# Patient Record
Sex: FEMALE | Race: WHITE | NOT HISPANIC OR LATINO | Employment: FULL TIME | URBAN - METROPOLITAN AREA
[De-identification: names, ages, dates, MRNs, and addresses within clinical notes are randomized per-mention and may not be internally consistent; named-entity substitution may affect disease eponyms.]

---
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
## 2022-03-18 ENCOUNTER — OFFICE VISIT (OUTPATIENT)
Dept: INTERNAL MEDICINE | Facility: CLINIC | Age: 22
End: 2022-03-18

## 2022-03-18 VITALS
DIASTOLIC BLOOD PRESSURE: 72 MMHG | SYSTOLIC BLOOD PRESSURE: 118 MMHG | HEART RATE: 66 BPM | WEIGHT: 133 LBS | HEIGHT: 62 IN | TEMPERATURE: 97.5 F | OXYGEN SATURATION: 99 % | BODY MASS INDEX: 24.48 KG/M2

## 2022-03-18 DIAGNOSIS — E55.9 VITAMIN D DEFICIENCY: ICD-10-CM

## 2022-03-18 DIAGNOSIS — F41.8 DEPRESSION WITH ANXIETY: Primary | ICD-10-CM

## 2022-03-18 DIAGNOSIS — R53.83 FATIGUE, UNSPECIFIED TYPE: ICD-10-CM

## 2022-03-18 PROCEDURE — 99214 OFFICE O/P EST MOD 30 MIN: CPT | Performed by: FAMILY MEDICINE

## 2022-03-18 RX ORDER — DESOG-E.ESTRADIOL/E.ESTRADIOL 21-5 (28)
TABLET ORAL
COMMUNITY
Start: 2022-02-22 | End: 2022-04-29 | Stop reason: ALTCHOICE

## 2022-03-18 RX ORDER — CITALOPRAM 20 MG/1
20 TABLET ORAL DAILY
Qty: 90 TABLET | Refills: 1 | Status: SHIPPED | OUTPATIENT
Start: 2022-03-18 | End: 2022-07-28 | Stop reason: SDUPTHER

## 2022-03-19 LAB
25(OH)D3+25(OH)D2 SERPL-MCNC: 21.4 NG/ML (ref 30–100)
ALBUMIN SERPL-MCNC: 4.6 G/DL (ref 3.5–5.2)
ALBUMIN/GLOB SERPL: 2 G/DL
ALP SERPL-CCNC: 77 U/L (ref 39–117)
ALT SERPL-CCNC: 38 U/L (ref 1–33)
AST SERPL-CCNC: 33 U/L (ref 1–32)
BASOPHILS # BLD AUTO: 0.05 10*3/MM3 (ref 0–0.2)
BASOPHILS NFR BLD AUTO: 0.5 % (ref 0–1.5)
BILIRUB SERPL-MCNC: 0.7 MG/DL (ref 0–1.2)
BUN SERPL-MCNC: 10 MG/DL (ref 6–20)
BUN/CREAT SERPL: 13.2 (ref 7–25)
CALCIUM SERPL-MCNC: 9.7 MG/DL (ref 8.6–10.5)
CHLORIDE SERPL-SCNC: 103 MMOL/L (ref 98–107)
CO2 SERPL-SCNC: 22.1 MMOL/L (ref 22–29)
CREAT SERPL-MCNC: 0.76 MG/DL (ref 0.57–1)
EGFRCR SERPLBLD CKD-EPI 2021: 115.2 ML/MIN/1.73
EOSINOPHIL # BLD AUTO: 0.13 10*3/MM3 (ref 0–0.4)
EOSINOPHIL NFR BLD AUTO: 1.4 % (ref 0.3–6.2)
ERYTHROCYTE [DISTWIDTH] IN BLOOD BY AUTOMATED COUNT: 12.5 % (ref 12.3–15.4)
FOLATE SERPL-MCNC: 7.46 NG/ML (ref 4.78–24.2)
GLOBULIN SER CALC-MCNC: 2.3 GM/DL
GLUCOSE SERPL-MCNC: 85 MG/DL (ref 65–99)
HCT VFR BLD AUTO: 40.2 % (ref 34–46.6)
HGB BLD-MCNC: 13.2 G/DL (ref 12–15.9)
IMM GRANULOCYTES # BLD AUTO: 0.03 10*3/MM3 (ref 0–0.05)
IMM GRANULOCYTES NFR BLD AUTO: 0.3 % (ref 0–0.5)
LYMPHOCYTES # BLD AUTO: 2.72 10*3/MM3 (ref 0.7–3.1)
LYMPHOCYTES NFR BLD AUTO: 28.3 % (ref 19.6–45.3)
MCH RBC QN AUTO: 29.5 PG (ref 26.6–33)
MCHC RBC AUTO-ENTMCNC: 32.8 G/DL (ref 31.5–35.7)
MCV RBC AUTO: 89.7 FL (ref 79–97)
MONOCYTES # BLD AUTO: 0.6 10*3/MM3 (ref 0.1–0.9)
MONOCYTES NFR BLD AUTO: 6.2 % (ref 5–12)
NEUTROPHILS # BLD AUTO: 6.08 10*3/MM3 (ref 1.7–7)
NEUTROPHILS NFR BLD AUTO: 63.3 % (ref 42.7–76)
NRBC BLD AUTO-RTO: 0 /100 WBC (ref 0–0.2)
PLATELET # BLD AUTO: 431 10*3/MM3 (ref 140–450)
POTASSIUM SERPL-SCNC: 4.4 MMOL/L (ref 3.5–5.2)
PROT SERPL-MCNC: 6.9 G/DL (ref 6–8.5)
RBC # BLD AUTO: 4.48 10*6/MM3 (ref 3.77–5.28)
SODIUM SERPL-SCNC: 140 MMOL/L (ref 136–145)
T4 FREE SERPL-MCNC: 1.02 NG/DL (ref 0.93–1.7)
TSH SERPL DL<=0.005 MIU/L-ACNC: 1.72 UIU/ML (ref 0.27–4.2)
VIT B12 SERPL-MCNC: 539 PG/ML (ref 211–946)
WBC # BLD AUTO: 9.61 10*3/MM3 (ref 3.4–10.8)

## 2022-04-03 PROBLEM — E55.9 VITAMIN D DEFICIENCY: Status: ACTIVE | Noted: 2022-04-03

## 2022-04-29 ENCOUNTER — OFFICE VISIT (OUTPATIENT)
Dept: INTERNAL MEDICINE | Facility: CLINIC | Age: 22
End: 2022-04-29

## 2022-04-29 VITALS
HEIGHT: 62 IN | DIASTOLIC BLOOD PRESSURE: 64 MMHG | SYSTOLIC BLOOD PRESSURE: 119 MMHG | TEMPERATURE: 96.9 F | OXYGEN SATURATION: 99 % | WEIGHT: 138 LBS | BODY MASS INDEX: 25.4 KG/M2 | RESPIRATION RATE: 15 BRPM | HEART RATE: 88 BPM

## 2022-04-29 DIAGNOSIS — E55.9 VITAMIN D DEFICIENCY: ICD-10-CM

## 2022-04-29 DIAGNOSIS — Z30.41 ORAL CONTRACEPTIVE PILL SURVEILLANCE: ICD-10-CM

## 2022-04-29 DIAGNOSIS — F41.8 DEPRESSION WITH ANXIETY: Primary | ICD-10-CM

## 2022-04-29 PROCEDURE — 99214 OFFICE O/P EST MOD 30 MIN: CPT | Performed by: FAMILY MEDICINE

## 2022-04-29 RX ORDER — LEVONORGESTREL / ETHINYL ESTRADIOL AND ETHINYL ESTRADIOL 150-30(84)
1 KIT ORAL DAILY
Qty: 91 EACH | Refills: 0 | Status: SHIPPED | OUTPATIENT
Start: 2022-04-29 | End: 2022-07-25

## 2022-04-29 NOTE — PROGRESS NOTES
Rachel Steel is a 21 y.o. female.    Chief Complaint   Patient presents with   • Depression     Fup. Doing better  Also talk about birth control        HPI   Patient presents today to follow up on anxiety and depression.  She was started on celexa at her last visit.  She is doing well on the medication.  Patient denies much feeling down, hopelessness, worthlessness.  Decreased nervousness and worry.  Sleeping well.      She does have vitamin D deficiency as seen on recent labs.  She has started vitamin D OTC daily.    Patient also is taking an oral contraceptive.  This was previously prescribed by gynecology.  She has an upcoming appointment with a new gynecologist, but not until June.  She has been receiving a 3 month supply of her contraceptive at time and skips the placebo week for the first 2 months and will take the placebo week on the 3rd month.  As a result, her insurance is not wanting to fill the medication a couple of weeks early.      The following portions of the patient's history were reviewed and updated as appropriate: allergies, current medications, past family history, past medical history, past social history, past surgical history and problem list.     Allergies   Allergen Reactions   • Sulfa Antibiotics Rash         Current Outpatient Medications:   •  citalopram (CeleXA) 20 MG tablet, Take 1 tablet by mouth Daily., Disp: 90 tablet, Rfl: 1  •  Levonorgest-Eth Estrad 91-Day 0.15-0.03 &0.01 MG tablet, Take 1 tablet by mouth Daily., Disp: 91 each, Rfl: 0  •  vitamin D3 125 MCG (5000 UT) capsule capsule, Take 5,000 Units by mouth Daily., Disp: , Rfl:     ROS    Review of Systems   Constitutional: Positive for fatigue. Negative for chills and fever.   Respiratory: Negative for cough and shortness of breath.    Cardiovascular: Negative for chest pain.   Gastrointestinal: Negative for abdominal pain, constipation, diarrhea, nausea and vomiting.   Neurological: Positive for dizziness.  "  Psychiatric/Behavioral: Positive for depressed mood. The patient is nervous/anxious.        Vitals:    04/29/22 1333   BP: 119/64   Pulse: 88   Resp: 15   Temp: 96.9 °F (36.1 °C)   SpO2: 99%   Weight: 62.6 kg (138 lb)   Height: 157.5 cm (62\")   PainSc: 0-No pain     Body mass index is 25.24 kg/m².    Physical Exam     Physical Exam  Constitutional:       General: She is not in acute distress.     Appearance: Normal appearance. She is well-developed.   HENT:      Head: Normocephalic and atraumatic.      Right Ear: External ear normal.      Left Ear: External ear normal.   Eyes:      Extraocular Movements: Extraocular movements intact.      Conjunctiva/sclera: Conjunctivae normal.   Cardiovascular:      Rate and Rhythm: Normal rate and regular rhythm.      Heart sounds: No murmur heard.  Pulmonary:      Effort: Pulmonary effort is normal. No respiratory distress.      Breath sounds: Normal breath sounds. No wheezing.   Abdominal:      General: Bowel sounds are normal. There is no distension.      Palpations: Abdomen is soft.      Tenderness: There is no abdominal tenderness.   Neurological:      Mental Status: She is alert and oriented to person, place, and time.      Cranial Nerves: No cranial nerve deficit.   Psychiatric:         Mood and Affect: Mood normal.         Behavior: Behavior normal.         Assessment/Plan    Problems Addressed this Visit     Depression with anxiety - Primary     Improved with current medication.  Continue celexa at current dosage.            Vitamin D deficiency     Continue oral supplement.  Will monitor vitamin D level every few months.             Oral contraceptive pill surveillance     Will start patient on seasonique 91 day contraceptive since she is taking her current oral contraceptive in a similar way.  Discussed insurance should cover this medication and will be dispensed in 3 month supply with a placebo week at the end of the 3 month package.  Patient to follow up with " gynecology as scheduled.                   New Medications Ordered This Visit   Medications   • Levonorgest-Eth Estrad 91-Day 0.15-0.03 &0.01 MG tablet     Sig: Take 1 tablet by mouth Daily.     Dispense:  91 each     Refill:  0       No orders of the defined types were placed in this encounter.      Return in about 3 months (around 7/29/2022) for anxiety and depression.    Edie Grant, DO

## 2022-04-30 NOTE — ASSESSMENT & PLAN NOTE
Improved with current medication.  Continue celexa at current dosage.    27 M no PMH p/w 4 days of frontal headache and eye pain. worst with laying flat. He has taken flonase and cetrizine w/o much improvement. He states he has hx of allergies and sinus issues for which he was recommended having a procedure few year before. he denies syncope, numbness, weakness, abdominal pain. he has normal neuro exam. He states he has trouble sleeping due to worsened congestion and frontal pain when lying flat. He states his headache is pressure like located over the front of his sinus and right eye. He denies fever, chills, purulent discharge  denies fever, chills, chest pain, SOB, abdominal pain, diarrhea, dysuria, syncope, bleeding, new rash,weakness, numbness, blurred vision    ROS  otherwise negative as per HPI  Gen: Awake, Alert, WD, WN, NAD  Head:  NC/AT  Eyes:  PERRL, EOMI, Conjunctiva pink, lids normal, no scleral icterus  ENT: OP clear, no exudates, no erythema, uvula midline, T moist mucus membranes, septal deviation w/ mild erythema. + maxillar and frontal sinus tendenress . TM both clear   Neck: supple, nontender, no meningismus, no JVD, trachea midline  Cardiac/CV:  S1 S2, RRR, no M/G/R  Respiratory/Pulm:  CTAB, good air movement, normal resp effort, no wheezes/stridor/retractions/rales/rhonchi  Gastrointestinal/Abdomen:  Soft, nontender, nondistended, +BS, no rebound/guarding  Back:  no CVAT, no MLT  Ext:  warm, well perfused, moving all extremities spontaneously, no peripheral edema, distal pulses intact  Skin: intact, no rash  Neuro:  AAOx3, sensation intact, motor 5/5 x 4 extremities, normal gait, speech clear  MDM as above

## 2022-04-30 NOTE — ASSESSMENT & PLAN NOTE
Will start patient on seasonique 91 day contraceptive since she is taking her current oral contraceptive in a similar way.  Discussed insurance should cover this medication and will be dispensed in 3 month supply with a placebo week at the end of the 3 month package.  Patient to follow up with gynecology as scheduled.

## 2022-05-17 ENCOUNTER — OFFICE VISIT (OUTPATIENT)
Dept: INTERNAL MEDICINE | Facility: CLINIC | Age: 22
End: 2022-05-17

## 2022-05-17 VITALS
SYSTOLIC BLOOD PRESSURE: 118 MMHG | TEMPERATURE: 97.6 F | WEIGHT: 138.4 LBS | OXYGEN SATURATION: 99 % | BODY MASS INDEX: 25.47 KG/M2 | HEART RATE: 80 BPM | DIASTOLIC BLOOD PRESSURE: 78 MMHG | HEIGHT: 62 IN

## 2022-05-17 DIAGNOSIS — L08.9 INFECTED CYST OF SKIN: Primary | ICD-10-CM

## 2022-05-17 DIAGNOSIS — L72.9 INFECTED CYST OF SKIN: Primary | ICD-10-CM

## 2022-05-17 PROCEDURE — 99213 OFFICE O/P EST LOW 20 MIN: CPT | Performed by: NURSE PRACTITIONER

## 2022-05-17 RX ORDER — DOXYCYCLINE HYCLATE 100 MG/1
100 CAPSULE ORAL 2 TIMES DAILY
Qty: 14 CAPSULE | Refills: 0 | Status: SHIPPED | OUTPATIENT
Start: 2022-05-17 | End: 2022-05-24

## 2022-05-17 NOTE — PROGRESS NOTES
"  Office Visit      Patient Name: Rachel Steel  : 2000   MRN: [id number]   Care Team: Patient Care Team:  Edie Grant DO as PCP - General (Family Medicine)    Chief Complaint  Swollen lymph node  (In right arm pit, red, this popped up this morning tender to the touch ) and Abdominal Pain (Right upper quadrant, associated with Nausea this started yesterday, loss of appetite, pain worsens with pressure is applied )    Subjective     Subjective      Rachel Steel presents to [facility] for lump in left armpit.   Symptoms started today.   Endorses fatigue, swollen tender lump under the left arm with erythema, left breast pain, nausea, and right upper quadrant pain.  She has not really had an appetite today, is able to hold down liquids and has been drinking water.  She has had trouble with GERD in the past however pain is usually more epigastric.  She is currently on DOLLY and recently changed brands, she reports she takes her medication as prescribed.  She has no prior history of MRSA infection.  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  She has never had this problem in the past.    Review of Systems   Constitutional: Positive for fatigue. Negative for chills and fever.   HENT: Positive for swollen glands. Negative for congestion, postnasal drip, sinus pressure, sneezing, sore throat and trouble swallowing.    Respiratory: Negative for cough, shortness of breath and wheezing.    Cardiovascular: Negative for chest pain.   Gastrointestinal: Positive for abdominal pain and nausea. Negative for diarrhea and vomiting.   Neurological: Negative for headache.   Psychiatric/Behavioral: Negative for sleep disturbance.       Objective     Objective   Vital Signs:   /78   Pulse 80   Temp 97.6 °F (36.4 °C) (Temporal)   Ht 157.5 cm (62\")   Wt 62.8 kg (138 lb 6.4 oz)   SpO2 99%   BMI 25.31 kg/m²     Physical Exam  Vitals and nursing note reviewed. Exam conducted with a " chaperone present (Leslye Womack [facility]).   Constitutional:       General: She is not in acute distress.     Appearance: Normal appearance. She is not toxic-appearing.   Eyes:      Pupils: Pupils are equal, round, and reactive to light.   Cardiovascular:      Rate and Rhythm: Normal rate and regular rhythm.      Heart sounds: Normal heart sounds. No murmur heard.  Pulmonary:      Effort: Pulmonary effort is normal. No respiratory distress.      Breath sounds: Normal breath sounds. No wheezing.   Chest:   Breasts:      Left: Normal. Axillary adenopathy present. No tenderness or supraclavicular adenopathy.         Abdominal:      General: Bowel sounds are normal. There is no distension.      Palpations: Abdomen is soft.      Tenderness: There is no abdominal tenderness. There is no guarding.   Lymphadenopathy:      Upper Body:      Left upper body: Axillary adenopathy present. No supraclavicular adenopathy.   Skin:     General: Skin is warm and dry.      Findings: No rash.   Neurological:      Mental Status: She is alert.   Psychiatric:         Mood and Affect: Mood normal.         Behavior: Behavior normal.          Assessment / Plan      Assessment & Plan   Problem List Items Addressed This Visit    None     Visit Diagnoses     Infected cyst of skin    -  Primary    Relevant Medications    doxycycline (VIBRAMYCIN) 100 MG capsule    Recommend doxycycline twice daily with food, advised on sun sensitivity and worsening nausea.  Recommend changing razor blade frequently and always using shaving cream, bland foods, small frequent meals, push fluids, can use warm compresses as needed, and follow-up in 1 week to ensure improvement.  No reproducible abdominal tenderness on exam today and without red flags.  Discussed worsening symptoms to return sooner with and she verbalized understanding.           Follow Up   Return in about 1 week (around 5/24/2022) for Next scheduled follow up.  Patient was given instructions and  counseling regarding her condition or for health maintenance advice. Please see specific information pulled into the AVS if appropriate.     MAHOGANY Watt  [facility]

## 2022-06-03 ENCOUNTER — OFFICE VISIT (OUTPATIENT)
Dept: INTERNAL MEDICINE | Facility: CLINIC | Age: 22
End: 2022-06-03

## 2022-06-03 ENCOUNTER — TELEPHONE (OUTPATIENT)
Dept: INTERNAL MEDICINE | Facility: CLINIC | Age: 22
End: 2022-06-03

## 2022-06-03 VITALS
DIASTOLIC BLOOD PRESSURE: 80 MMHG | HEART RATE: 100 BPM | WEIGHT: 135 LBS | SYSTOLIC BLOOD PRESSURE: 120 MMHG | BODY MASS INDEX: 24.84 KG/M2 | HEIGHT: 62 IN | OXYGEN SATURATION: 99 % | TEMPERATURE: 97.2 F

## 2022-06-03 DIAGNOSIS — R10.11 RUQ ABDOMINAL PAIN: Primary | ICD-10-CM

## 2022-06-03 DIAGNOSIS — R10.819 SUPRAPUBIC TENDERNESS: ICD-10-CM

## 2022-06-03 LAB
BILIRUB BLD-MCNC: NEGATIVE MG/DL
CLARITY, POC: CLEAR
COLOR UR: ABNORMAL
EXPIRATION DATE: ABNORMAL
GLUCOSE UR STRIP-MCNC: NEGATIVE MG/DL
KETONES UR QL: NEGATIVE
LEUKOCYTE EST, POC: NEGATIVE
Lab: ABNORMAL
NITRITE UR-MCNC: NEGATIVE MG/ML
PH UR: 6 [PH] (ref 5–8)
PROT UR STRIP-MCNC: NEGATIVE MG/DL
RBC # UR STRIP: ABNORMAL /UL
SP GR UR: 1.02 (ref 1–1.03)
UROBILINOGEN UR QL: NORMAL

## 2022-06-03 PROCEDURE — 99213 OFFICE O/P EST LOW 20 MIN: CPT | Performed by: FAMILY MEDICINE

## 2022-06-03 PROCEDURE — 81003 URINALYSIS AUTO W/O SCOPE: CPT | Performed by: FAMILY MEDICINE

## 2022-06-03 RX ORDER — CIPROFLOXACIN 500 MG/1
500 TABLET, FILM COATED ORAL 2 TIMES DAILY
Qty: 14 TABLET | Refills: 0 | Status: SHIPPED | OUTPATIENT
Start: 2022-06-03 | End: 2022-06-03

## 2022-06-03 RX ORDER — DICYCLOMINE HYDROCHLORIDE 10 MG/1
10 CAPSULE ORAL
Qty: 120 CAPSULE | Refills: 1 | Status: SHIPPED | OUTPATIENT
Start: 2022-06-03 | End: 2022-06-27

## 2022-06-03 RX ORDER — CEFDINIR 300 MG/1
300 CAPSULE ORAL 2 TIMES DAILY
Qty: 14 CAPSULE | Refills: 0 | Status: SHIPPED | OUTPATIENT
Start: 2022-06-03 | End: 2022-06-13

## 2022-06-03 NOTE — ASSESSMENT & PLAN NOTE
Concern for gallbladder dysfunction.  Will obtain ultrasound for further evaluation.  May take bentyl prn symptomatic relief.

## 2022-06-03 NOTE — PROGRESS NOTES
"Rachel Steel is a 21 y.o. female.    Chief Complaint   Patient presents with   • Abdominal Pain     Mostly when she eats, upper right quad, sometimes it hurts for hours and sometimes it stops quickly.        HPI   Patient reports RUQ pain for the last 3 weeks.  Pain is intermittent mostly.  Pain is sharp and stabbing.  She reports occasional cramps to RLQ.  She reports pain is worse with any foods.  She denies reflux.  Admits to nausea, diarrhea, and constipation.  Denies any fever.     The following portions of the patient's history were reviewed and updated as appropriate: allergies, current medications, past family history, past medical history, past social history, past surgical history and problem list.     Allergies   Allergen Reactions   • Sulfa Antibiotics Rash         Current Outpatient Medications:   •  citalopram (CeleXA) 20 MG tablet, Take 1 tablet by mouth Daily., Disp: 90 tablet, Rfl: 1  •  Levonorgest-Eth Estrad 91-Day 0.15-0.03 &0.01 MG tablet, Take 1 tablet by mouth Daily., Disp: 91 each, Rfl: 0  •  vitamin D3 125 MCG (5000 UT) capsule capsule, Take 5,000 Units by mouth Daily., Disp: , Rfl:   •  cefdinir (OMNICEF) 300 MG capsule, Take 1 capsule by mouth 2 (Two) Times a Day., Disp: 14 capsule, Rfl: 0  •  dicyclomine (Bentyl) 10 MG capsule, Take 1 capsule by mouth 4 (Four) Times a Day Before Meals & at Bedtime., Disp: 120 capsule, Rfl: 1    ROS    Review of Systems   Constitutional: Negative for chills and fever.   Respiratory: Negative for shortness of breath.    Cardiovascular: Positive for chest pain (pressure).   Gastrointestinal: Positive for abdominal pain, constipation, diarrhea and nausea. Negative for GERD.   Genitourinary: Negative for dysuria.       Vitals:    06/03/22 1505   BP: 120/80   Pulse: 100   Temp: 97.2 °F (36.2 °C)   SpO2: 99%   Weight: 61.2 kg (135 lb)   Height: 157.5 cm (62\")     Body mass index is 24.69 kg/m².    Physical Exam     Physical Exam  Constitutional:       General: " She is not in acute distress.     Appearance: Normal appearance. She is well-developed.   HENT:      Head: Normocephalic and atraumatic.      Right Ear: External ear normal.      Left Ear: External ear normal.   Eyes:      Extraocular Movements: Extraocular movements intact.      Conjunctiva/sclera: Conjunctivae normal.   Cardiovascular:      Rate and Rhythm: Normal rate and regular rhythm.      Heart sounds: No murmur heard.  Pulmonary:      Effort: Pulmonary effort is normal. No respiratory distress.      Breath sounds: Normal breath sounds. No wheezing.   Abdominal:      General: Bowel sounds are normal. There is no distension.      Palpations: Abdomen is soft.      Tenderness: There is abdominal tenderness in the right upper quadrant and suprapubic area.   Skin:     Coloration: Skin is not pale.   Neurological:      Mental Status: She is alert and oriented to person, place, and time.      Cranial Nerves: No cranial nerve deficit.   Psychiatric:         Mood and Affect: Mood normal.         Behavior: Behavior normal.         Assessment/Plan    Problems Addressed this Visit     RUQ abdominal pain - Primary     Concern for gallbladder dysfunction.  Will obtain ultrasound for further evaluation.  May take bentyl prn symptomatic relief.            Relevant Orders    US Gallbladder    Urine Culture - Urine, Urine, Clean Catch    Suprapubic tenderness     Denies any previous pain, but tender on exam.  UA positive for hematuria.  Will treat with omnicef and send urine for culture.            Relevant Orders    POC Urinalysis Dipstick, Automated (Completed)    Urine Culture - Urine, Urine, Clean Catch          New Medications Ordered This Visit   Medications   • dicyclomine (Bentyl) 10 MG capsule     Sig: Take 1 capsule by mouth 4 (Four) Times a Day Before Meals & at Bedtime.     Dispense:  120 capsule     Refill:  1       No orders of the defined types were placed in this encounter.      Return for Next scheduled follow  up.    Edie Grant, DO

## 2022-06-03 NOTE — ASSESSMENT & PLAN NOTE
Denies any previous pain, but tender on exam.  UA positive for hematuria.  Will treat with omnicef and send urine for culture.

## 2022-06-06 ENCOUNTER — TELEPHONE (OUTPATIENT)
Dept: INTERNAL MEDICINE | Facility: CLINIC | Age: 22
End: 2022-06-06

## 2022-06-06 LAB
BACTERIA UR CULT: NORMAL
BACTERIA UR CULT: NORMAL

## 2022-06-06 NOTE — TELEPHONE ENCOUNTER
----- Message from Edie Grant DO sent at 6/6/2022  9:06 AM EDT -----  Results have been reviewed. Please notify patient of normal results.

## 2022-06-13 ENCOUNTER — PATIENT MESSAGE (OUTPATIENT)
Dept: INTERNAL MEDICINE | Facility: CLINIC | Age: 22
End: 2022-06-13

## 2022-06-13 ENCOUNTER — OFFICE VISIT (OUTPATIENT)
Dept: OBSTETRICS AND GYNECOLOGY | Facility: CLINIC | Age: 22
End: 2022-06-13

## 2022-06-13 VITALS
SYSTOLIC BLOOD PRESSURE: 120 MMHG | HEIGHT: 62 IN | WEIGHT: 139 LBS | BODY MASS INDEX: 25.58 KG/M2 | DIASTOLIC BLOOD PRESSURE: 60 MMHG

## 2022-06-13 DIAGNOSIS — N94.10 FEMALE DYSPAREUNIA: ICD-10-CM

## 2022-06-13 DIAGNOSIS — N94.6 DYSMENORRHEA: ICD-10-CM

## 2022-06-13 DIAGNOSIS — K82.8 DYSFUNCTIONAL GALLBLADDER: Primary | ICD-10-CM

## 2022-06-13 DIAGNOSIS — Z11.3 SCREENING EXAMINATION FOR STD (SEXUALLY TRANSMITTED DISEASE): Primary | ICD-10-CM

## 2022-06-13 DIAGNOSIS — K59.00 DYSCHEZIA: ICD-10-CM

## 2022-06-13 DIAGNOSIS — R94.8 ABNORMAL BILIARY HIDA SCAN: ICD-10-CM

## 2022-06-13 PROCEDURE — 99213 OFFICE O/P EST LOW 20 MIN: CPT | Performed by: OBSTETRICS & GYNECOLOGY

## 2022-06-13 PROCEDURE — 99385 PREV VISIT NEW AGE 18-39: CPT | Performed by: OBSTETRICS & GYNECOLOGY

## 2022-06-15 LAB
C TRACH RRNA SPEC QL NAA+PROBE: NEGATIVE
N GONORRHOEA RRNA SPEC QL NAA+PROBE: NEGATIVE
T VAGINALIS RRNA SPEC QL NAA+PROBE: NEGATIVE

## 2022-06-17 ENCOUNTER — TELEPHONE (OUTPATIENT)
Dept: INTERNAL MEDICINE | Facility: CLINIC | Age: 22
End: 2022-06-17

## 2022-06-17 DIAGNOSIS — R10.11 RUQ ABDOMINAL PAIN: Primary | ICD-10-CM

## 2022-06-17 NOTE — TELEPHONE ENCOUNTER
Called and explained that Dr. Vermeesch has ordered a HIDA scan and someone will call to schedule it.

## 2022-06-17 NOTE — TELEPHONE ENCOUNTER
Pharmacy Name: [facility]     Pharmacy representative name: N/A    Pharmacy representative phone number: [phone]    What medication are you calling in regards to: dicyclomine (Bentyl) 10 MG capsule       What question does the pharmacy have: THE MEDICATION HAS INTERACTION WITH CITALOPRAM THAT PATIENT IS TAKING AND WANTED TO MAKE SURE THAT PROVIDER KNOW THIS    Who is the provider that prescribed the medication: PABLO    Additional notes: N/A

## 2022-06-24 ENCOUNTER — APPOINTMENT (OUTPATIENT)
Dept: ULTRASOUND IMAGING | Facility: HOSPITAL | Age: 22
End: 2022-06-24

## 2022-06-24 ENCOUNTER — HOSPITAL ENCOUNTER (OUTPATIENT)
Dept: NUCLEAR MEDICINE | Facility: HOSPITAL | Age: 22
Discharge: HOME | End: 2022-06-24

## 2022-06-24 DIAGNOSIS — R10.11 RUQ ABDOMINAL PAIN: ICD-10-CM

## 2022-06-24 PROCEDURE — 0 TECHNETIUM TC 99M MEBROFENIN KIT: Performed by: INTERNAL MEDICINE

## 2022-06-24 PROCEDURE — 78227 HEPATOBIL SYST IMAGE W/DRUG: CPT

## 2022-06-24 PROCEDURE — A9537 TC99M MEBROFENIN: HCPCS | Performed by: INTERNAL MEDICINE

## 2022-06-24 RX ORDER — KIT FOR THE PREPARATION OF TECHNETIUM TC 99M MEBROFENIN 45 MG/10ML
1 INJECTION, POWDER, LYOPHILIZED, FOR SOLUTION INTRAVENOUS
Status: COMPLETED | OUTPATIENT
Start: 2022-06-24 | End: 2022-06-24

## 2022-06-24 RX ADMIN — MEBROFENIN 1 DOSE: 45 INJECTION, POWDER, LYOPHILIZED, FOR SOLUTION INTRAVENOUS at 10:24

## 2022-06-24 NOTE — PROGRESS NOTES
Please see this abnormal HIDA scan on your patient.  I ordered it while you were gone.  She will need to see surgeon for low ejection fraction of her gallbladder.  Thanks

## 2022-06-27 RX ORDER — DICYCLOMINE HYDROCHLORIDE 10 MG/1
10 CAPSULE ORAL
Qty: 120 CAPSULE | Refills: 1 | Status: SHIPPED | OUTPATIENT
Start: 2022-06-27 | End: 2022-08-22

## 2022-06-27 NOTE — TELEPHONE ENCOUNTER
From: Rachel Steel  To: Edie Grant DO  Sent: 6/13/2022 10:13 AM EDT  Subject: Gallbladder    Hello I didn’t know if my results from my ultrasound have come back yet or not. I have been having some more nausea the past few days and today have not been able to eat anything but have had diarrhea 3 times within the last 2 hours today and didn’t know if that was something I should mention with what’s been going on.

## 2022-07-05 ENCOUNTER — TELEPHONE (OUTPATIENT)
Dept: SURGERY | Facility: CLINIC | Age: 22
End: 2022-07-05

## 2022-07-05 NOTE — TELEPHONE ENCOUNTER
Patient called and states she has been having blood in stool since 7/3/2022 and wants to know if she should be seen sooner than her appointment scheduled on appointment on 8/4/2022.

## 2022-07-05 NOTE — TELEPHONE ENCOUNTER
Patient called requesting a sooner appointment for blood in her stool. She advised me that it was only a small amount now and she will contact her PCP.

## 2022-07-08 ENCOUNTER — OFFICE VISIT (OUTPATIENT)
Dept: INTERNAL MEDICINE | Facility: CLINIC | Age: 22
End: 2022-07-08

## 2022-07-08 VITALS
OXYGEN SATURATION: 99 % | TEMPERATURE: 97 F | SYSTOLIC BLOOD PRESSURE: 110 MMHG | HEIGHT: 62 IN | DIASTOLIC BLOOD PRESSURE: 68 MMHG | BODY MASS INDEX: 23.92 KG/M2 | HEART RATE: 78 BPM | WEIGHT: 130 LBS

## 2022-07-08 DIAGNOSIS — R10.11 RUQ ABDOMINAL PAIN: ICD-10-CM

## 2022-07-08 DIAGNOSIS — K92.1 HEMATOCHEZIA: Primary | ICD-10-CM

## 2022-07-08 DIAGNOSIS — K64.8 INTERNAL HEMORRHOID: ICD-10-CM

## 2022-07-08 PROCEDURE — 99214 OFFICE O/P EST MOD 30 MIN: CPT | Performed by: FAMILY MEDICINE

## 2022-07-08 RX ORDER — HYDROCORTISONE 25 MG/G
CREAM TOPICAL 2 TIMES DAILY
Qty: 28 G | Refills: 2 | OUTPATIENT
Start: 2022-07-08 | End: 2022-08-15

## 2022-07-08 NOTE — PROGRESS NOTES
Rachel Steel is a 21 y.o. female.    Chief Complaint   Patient presents with   • Black or Bloody Stool       HPI   Patient reports bloody stool that began about 5 days ago.  She reports blood can be red or brown in color.  The initial bloody BM was red and did fill the toilet. Denies any rectal pain .  She has noticed a small amount blood in BMs earlier this week.  Today she has had about 6 BMs.  She reports she has had formed stools and loosed stools. Constipation has been worse in the last couple of weeks.  Patient does have gallbladder dysfunction and is scheduled to see general surgery next month to discuss excision.  She has been taking Bentyl as needed for abdominal cramping.  She reports that it helps minimally with the pain.    The following portions of the patient's history were reviewed and updated as appropriate: allergies, current medications, past family history, past medical history, past social history, past surgical history and problem list.     Allergies   Allergen Reactions   • Sulfa Antibiotics Rash         Current Outpatient Medications:   •  citalopram (CeleXA) 20 MG tablet, Take 1 tablet by mouth Daily., Disp: 90 tablet, Rfl: 1  •  dicyclomine (BENTYL) 10 MG capsule, TAKE 1 CAPSULE BY MOUTH 4 (FOUR) TIMES A DAY BEFORE MEALS & AT BEDTIME., Disp: 120 capsule, Rfl: 1  •  Levonorgest-Eth Estrad 91-Day 0.15-0.03 &0.01 MG tablet, Take 1 tablet by mouth Daily., Disp: 91 each, Rfl: 0  •  vitamin D3 125 MCG (5000 UT) capsule capsule, Take 5,000 Units by mouth Daily., Disp: , Rfl:   •  Hydrocortisone, Perianal, (ANUSOL-HC) 2.5 % rectal cream, Insert  into the rectum 2 (Two) Times a Day., Disp: 28 g, Rfl: 2    ROS    Review of Systems   Constitutional: Negative for chills and fever.   Respiratory: Negative for cough and shortness of breath.    Cardiovascular: Negative for chest pain.   Gastrointestinal: Positive for abdominal pain, blood in stool and constipation. Negative for diarrhea and vomiting.  "      Vitals:    07/08/22 1608   BP: 110/68   Pulse: 78   Temp: 97 °F (36.1 °C)   SpO2: 99%   Weight: 59 kg (130 lb)   Height: 157.5 cm (62\")     Body mass index is 23.78 kg/m².    Physical Exam     Physical Exam  Constitutional:       General: She is not in acute distress.     Appearance: Normal appearance. She is well-developed.   HENT:      Head: Normocephalic and atraumatic.      Right Ear: External ear normal.      Left Ear: External ear normal.   Eyes:      Extraocular Movements: Extraocular movements intact.      Conjunctiva/sclera: Conjunctivae normal.   Cardiovascular:      Rate and Rhythm: Normal rate.   Pulmonary:      Effort: Pulmonary effort is normal. No respiratory distress.   Abdominal:      General: Bowel sounds are normal. There is no distension.      Palpations: Abdomen is soft.   Genitourinary:     Rectum: Internal hemorrhoid present.   Skin:     Coloration: Skin is not pale.   Neurological:      Mental Status: She is alert and oriented to person, place, and time.      Cranial Nerves: No cranial nerve deficit.   Psychiatric:         Mood and Affect: Mood normal.         Behavior: Behavior normal.         Assessment/Plan    Problems Addressed this Visit     RUQ abdominal pain     Secondary to gallbladder dysfunction.  Patient may increase Bentyl to 2 capsules up to 4 times daily as needed.  Awaiting appointment with general surgery.           Hematochezia - Primary     Likely secondary to internal hemorrhoid.  Will treat as such.  Patient is to notify the office if bleeding is profuse or does not improve.           Internal hemorrhoid     May take over-the-counter stool softeners or MiraLAX to help with constipation.  We will treat with Anusol rectal cream.                 New Medications Ordered This Visit   Medications   • Hydrocortisone, Perianal, (ANUSOL-HC) 2.5 % rectal cream     Sig: Insert  into the rectum 2 (Two) Times a Day.     Dispense:  28 g     Refill:  2       No orders of the " defined types were placed in this encounter.      Return for Next scheduled follow up.    Edie Grant,

## 2022-07-18 PROBLEM — K92.1 HEMATOCHEZIA: Status: ACTIVE | Noted: 2022-07-18

## 2022-07-18 PROBLEM — K64.8 INTERNAL HEMORRHOID: Status: ACTIVE | Noted: 2022-07-18

## 2022-07-18 NOTE — ASSESSMENT & PLAN NOTE
Likely secondary to internal hemorrhoid.  Will treat as such.  Patient is to notify the office if bleeding is profuse or does not improve.  
May take over-the-counter stool softeners or MiraLAX to help with constipation.  We will treat with Anusol rectal cream.  
Secondary to gallbladder dysfunction.  Patient may increase Bentyl to 2 capsules up to 4 times daily as needed.  Awaiting appointment with general surgery.  
0

## 2022-07-22 ENCOUNTER — OFFICE VISIT (OUTPATIENT)
Dept: INTERNAL MEDICINE | Facility: CLINIC | Age: 22
End: 2022-07-22

## 2022-07-22 VITALS
OXYGEN SATURATION: 99 % | SYSTOLIC BLOOD PRESSURE: 110 MMHG | BODY MASS INDEX: 25.4 KG/M2 | HEIGHT: 62 IN | DIASTOLIC BLOOD PRESSURE: 62 MMHG | HEART RATE: 88 BPM | TEMPERATURE: 97 F | WEIGHT: 138 LBS

## 2022-07-22 DIAGNOSIS — R42 DIZZINESS: ICD-10-CM

## 2022-07-22 DIAGNOSIS — R00.2 PALPITATIONS: ICD-10-CM

## 2022-07-22 DIAGNOSIS — K92.1 HEMATOCHEZIA: Primary | ICD-10-CM

## 2022-07-22 DIAGNOSIS — R10.9 ABDOMINAL PAIN, UNSPECIFIED ABDOMINAL LOCATION: ICD-10-CM

## 2022-07-22 PROCEDURE — 99214 OFFICE O/P EST MOD 30 MIN: CPT | Performed by: FAMILY MEDICINE

## 2022-07-22 NOTE — PROGRESS NOTES
Rachel Steel is a 21 y.o. female.    Chief Complaint   Patient presents with   • Rectal Bleeding     Has gotten worse, not stopped since the first time, when she goes to the bathroom its like a period amount.    • Irregular Heart Beat   • Dizziness       HPI   Patient presents today with persistent rectal bleeding.  It waxes and wanes.  She was recently given anusol for internal hemorrhoids.  Patient denies rectal pain or itching. .She does have stomach pains in random areas.  Admits to nausea, but denies vomiting.  She has a BM twice a day.  She does have formed stools and watery stools. Admits to white, mucus in stool. In addition, she has developed palpitations and dizziness.      The following portions of the patient's history were reviewed and updated as appropriate: allergies, current medications, past family history, past medical history, past social history, past surgical history and problem list.     Allergies   Allergen Reactions   • Sulfa Antibiotics Rash         Current Outpatient Medications:   •  dicyclomine (BENTYL) 10 MG capsule, TAKE 1 CAPSULE BY MOUTH 4 (FOUR) TIMES A DAY BEFORE MEALS & AT BEDTIME., Disp: 120 capsule, Rfl: 1  •  Hydrocortisone, Perianal, (ANUSOL-HC) 2.5 % rectal cream, Insert  into the rectum 2 (Two) Times a Day., Disp: 28 g, Rfl: 2  •  vitamin D3 125 MCG (5000 UT) capsule capsule, Take 5,000 Units by mouth Daily., Disp: , Rfl:   •  citalopram (CeleXA) 40 MG tablet, Take 1 tablet by mouth Daily., Disp: 90 tablet, Rfl: 1  •  Levonorgest-Eth Estrad 91-Day 0.15-0.03 &0.01 MG tablet, TAKE 1 TABLET BY MOUTH EVERY DAY, Disp: 28 each, Rfl: 5    ROS    Review of Systems   Constitutional: Positive for fatigue. Negative for chills and fever.   Respiratory: Negative for cough and shortness of breath.    Cardiovascular: Positive for chest pain (a few times, sharp ) and palpitations.   Gastrointestinal: Positive for blood in stool, constipation, diarrhea and nausea. Negative for abdominal pain  "and vomiting.   Neurological: Positive for dizziness and light-headedness.       Vitals:    07/22/22 0841   BP: 110/62   Pulse: 88   Temp: 97 °F (36.1 °C)   SpO2: 99%   Weight: 62.6 kg (138 lb)   Height: 157.5 cm (62\")     Body mass index is 25.24 kg/m².    Physical Exam     Physical Exam  Constitutional:       General: She is not in acute distress.     Appearance: Normal appearance. She is well-developed.   HENT:      Head: Normocephalic and atraumatic.      Right Ear: External ear normal.      Left Ear: External ear normal.   Eyes:      Extraocular Movements: Extraocular movements intact.      Conjunctiva/sclera: Conjunctivae normal.   Cardiovascular:      Rate and Rhythm: Normal rate and regular rhythm.      Heart sounds: No murmur heard.  Pulmonary:      Effort: Pulmonary effort is normal. No respiratory distress.      Breath sounds: Normal breath sounds. No wheezing.   Abdominal:      General: Bowel sounds are normal. There is no distension.      Palpations: Abdomen is soft.      Tenderness: There is no abdominal tenderness.   Musculoskeletal:      Right lower leg: No edema.      Left lower leg: No edema.   Skin:     General: Skin is warm and dry.   Neurological:      Mental Status: She is alert and oriented to person, place, and time.      Cranial Nerves: No cranial nerve deficit.   Psychiatric:         Mood and Affect: Mood normal.         Behavior: Behavior normal.         Assessment/Plan    Problems Addressed this Visit     Hematochezia - Primary    Relevant Orders    CBC & Differential (Completed)    Celiac Comprehensive Panel (Completed)    Inflammatory Bowel Disease Panel (Completed)    Ova & Parasite Examination - Stool, Per Rectum    Stool Culture (Reference Lab) - Stool, Per Rectum (Completed)    Specimen Status Report (Completed)    Ova & Parasite Examination - Stool, Per Rectum (Completed)      Other Visit Diagnoses     Abdominal pain, unspecified abdominal location        Relevant Orders    Celiac " Comprehensive Panel (Completed)    Inflammatory Bowel Disease Panel (Completed)    Palpitations        Relevant Orders    Comprehensive Metabolic Panel (Completed)    Dizziness         With new onset of palpitations and dizziness, there is concern for developing anemia due to blood loss.  In addition, she is now experiencing mucus in stool.  Will obtain labs to further evaluate changes in BMs and rule out anemia and dehydration.     No orders of the defined types were placed in this encounter.      No orders of the defined types were placed in this encounter.      Return if symptoms worsen or fail to improve, for Next scheduled follow up.    Edie Grant, DO

## 2022-07-23 PROCEDURE — 87209 SMEAR COMPLEX STAIN: CPT | Performed by: FAMILY MEDICINE

## 2022-07-23 PROCEDURE — 87177 OVA AND PARASITES SMEARS: CPT | Performed by: FAMILY MEDICINE

## 2022-07-23 PROCEDURE — 87046 STOOL CULTR AEROBIC BACT EA: CPT | Performed by: FAMILY MEDICINE

## 2022-07-23 PROCEDURE — 87427 SHIGA-LIKE TOXIN AG IA: CPT | Performed by: FAMILY MEDICINE

## 2022-07-23 PROCEDURE — 87045 FECES CULTURE AEROBIC BACT: CPT | Performed by: FAMILY MEDICINE

## 2022-07-25 RX ORDER — LEVONORGESTREL / ETHINYL ESTRADIOL AND ETHINYL ESTRADIOL 150-30(84)
KIT ORAL
Qty: 28 EACH | Refills: 5 | Status: SHIPPED | OUTPATIENT
Start: 2022-07-25 | End: 2023-02-02 | Stop reason: SINTOL

## 2022-07-25 NOTE — TELEPHONE ENCOUNTER
Rx Refill Note  Requested Prescriptions     Pending Prescriptions Disp Refills   • Levonorgest-Eth Estrad 91-Day 0.15-0.03 &0.01 MG tablet [Pharmacy Med Name: LEVONO-E ESTRAD 0.15-0.03-0.01]       Sig: TAKE 1 TABLET BY MOUTH EVERY DAY      Last office visit with prescribing clinician: 7/22/2022      Next office visit with prescribing clinician: 7/28/2022            Danni Hay LPN  07/25/22, 12:56 EDT

## 2022-07-26 LAB
ALBUMIN SERPL-MCNC: 4.4 G/DL (ref 3.5–5.2)
ALBUMIN/GLOB SERPL: 1.8 G/DL
ALP SERPL-CCNC: 77 U/L (ref 39–117)
ALT SERPL-CCNC: 18 U/L (ref 1–33)
AST SERPL-CCNC: 18 U/L (ref 1–32)
BAKER'S YEAST IGA QN: <20 UNITS (ref 0–24.9)
BAKER'S YEAST IGG QN: <20 UNITS (ref 0–24.9)
BASOPHILS # BLD AUTO: 0.05 10*3/MM3 (ref 0–0.2)
BASOPHILS NFR BLD AUTO: 0.6 % (ref 0–1.5)
BILIRUB SERPL-MCNC: 0.9 MG/DL (ref 0–1.2)
BUN SERPL-MCNC: 10 MG/DL (ref 6–20)
BUN/CREAT SERPL: 12.3 (ref 7–25)
CALCIUM SERPL-MCNC: 9.3 MG/DL (ref 8.6–10.5)
CHLORIDE SERPL-SCNC: 101 MMOL/L (ref 98–107)
CO2 SERPL-SCNC: 25.7 MMOL/L (ref 22–29)
CREAT SERPL-MCNC: 0.81 MG/DL (ref 0.57–1)
EGFRCR SERPLBLD CKD-EPI 2021: 106.7 ML/MIN/1.73
ENDOMYSIUM IGA SER QL: NEGATIVE
EOSINOPHIL # BLD AUTO: 0.15 10*3/MM3 (ref 0–0.4)
EOSINOPHIL NFR BLD AUTO: 1.8 % (ref 0.3–6.2)
ERYTHROCYTE [DISTWIDTH] IN BLOOD BY AUTOMATED COUNT: 12.3 % (ref 12.3–15.4)
GLIADIN PEPTIDE IGA SER-ACNC: 3 UNITS (ref 0–19)
GLIADIN PEPTIDE IGG SER-ACNC: 2 UNITS (ref 0–19)
GLOBULIN SER CALC-MCNC: 2.5 GM/DL
GLUCOSE SERPL-MCNC: 88 MG/DL (ref 65–99)
HCT VFR BLD AUTO: 39.7 % (ref 34–46.6)
HGB BLD-MCNC: 13 G/DL (ref 12–15.9)
IGA SERPL-MCNC: 132 MG/DL (ref 87–352)
IMM GRANULOCYTES # BLD AUTO: 0.02 10*3/MM3 (ref 0–0.05)
IMM GRANULOCYTES NFR BLD AUTO: 0.2 % (ref 0–0.5)
LYMPHOCYTES # BLD AUTO: 2.22 10*3/MM3 (ref 0.7–3.1)
LYMPHOCYTES NFR BLD AUTO: 26.3 % (ref 19.6–45.3)
MCH RBC QN AUTO: 29 PG (ref 26.6–33)
MCHC RBC AUTO-ENTMCNC: 32.7 G/DL (ref 31.5–35.7)
MCV RBC AUTO: 88.6 FL (ref 79–97)
MONOCYTES # BLD AUTO: 0.52 10*3/MM3 (ref 0.1–0.9)
MONOCYTES NFR BLD AUTO: 6.2 % (ref 5–12)
NEUTROPHILS # BLD AUTO: 5.47 10*3/MM3 (ref 1.7–7)
NEUTROPHILS NFR BLD AUTO: 64.9 % (ref 42.7–76)
NRBC BLD AUTO-RTO: 0 /100 WBC (ref 0–0.2)
P-ANCA ATYPICAL TITR SER IF: ABNORMAL TITER
PLATELET # BLD AUTO: 398 10*3/MM3 (ref 140–450)
POTASSIUM SERPL-SCNC: 4.5 MMOL/L (ref 3.5–5.2)
PROT SERPL-MCNC: 6.9 G/DL (ref 6–8.5)
RBC # BLD AUTO: 4.48 10*6/MM3 (ref 3.77–5.28)
SODIUM SERPL-SCNC: 136 MMOL/L (ref 136–145)
TTG IGA SER-ACNC: <2 U/ML (ref 0–3)
TTG IGG SER-ACNC: <2 U/ML (ref 0–5)
WBC # BLD AUTO: 8.43 10*3/MM3 (ref 3.4–10.8)

## 2022-07-28 ENCOUNTER — OFFICE VISIT (OUTPATIENT)
Dept: INTERNAL MEDICINE | Facility: CLINIC | Age: 22
End: 2022-07-28

## 2022-07-28 VITALS
WEIGHT: 136 LBS | BODY MASS INDEX: 25.03 KG/M2 | TEMPERATURE: 97 F | DIASTOLIC BLOOD PRESSURE: 80 MMHG | OXYGEN SATURATION: 100 % | SYSTOLIC BLOOD PRESSURE: 120 MMHG | HEART RATE: 88 BPM | HEIGHT: 62 IN

## 2022-07-28 DIAGNOSIS — K92.1 HEMATOCHEZIA: ICD-10-CM

## 2022-07-28 DIAGNOSIS — R10.11 RUQ ABDOMINAL PAIN: ICD-10-CM

## 2022-07-28 DIAGNOSIS — K64.8 INTERNAL HEMORRHOID: ICD-10-CM

## 2022-07-28 DIAGNOSIS — F41.8 DEPRESSION WITH ANXIETY: Primary | ICD-10-CM

## 2022-07-28 PROBLEM — K29.00 ACUTE GASTRITIS: Status: RESOLVED | Noted: 2017-01-19 | Resolved: 2022-07-28

## 2022-07-28 PROCEDURE — 99214 OFFICE O/P EST MOD 30 MIN: CPT | Performed by: FAMILY MEDICINE

## 2022-07-28 RX ORDER — CITALOPRAM 40 MG/1
40 TABLET ORAL DAILY
Qty: 90 TABLET | Refills: 1 | Status: SHIPPED | OUTPATIENT
Start: 2022-07-28

## 2022-07-28 RX ORDER — CITALOPRAM 40 MG/1
40 TABLET ORAL DAILY
Qty: 90 TABLET | Refills: 1 | Status: SHIPPED | OUTPATIENT
Start: 2022-07-28 | End: 2022-07-28 | Stop reason: SDUPTHER

## 2022-07-28 NOTE — PROGRESS NOTES
Rachel Steel is a 21 y.o. female.    Chief Complaint   Patient presents with   • Anxiety   • Depression       HPI   Patient has depression and anxiety.  She has been taking celexa primarily with good response.  She is sleeping okay. She does admit to one anxiety and depression symptoms. She admits to nervousness, worry, hopelessness, worthless.  However, she reports this is improved with medications.    Patient was noted to have gallbladder dysfunction on recent imaging with HIDA scan.  She continues to have RUQ pain, nausea, bloating.  Also has developed rectal bleeding.  She has been treated for internal hemorrhoids.  She has noticed trouble with constipation as well.       The following portions of the patient's history were reviewed and updated as appropriate: allergies, current medications, past family history, past medical history, past social history, past surgical history and problem list.     Allergies   Allergen Reactions   • Sulfa Antibiotics Rash         Current Outpatient Medications:   •  citalopram (CeleXA) 40 MG tablet, Take 1 tablet by mouth Daily., Disp: 90 tablet, Rfl: 1  •  dicyclomine (BENTYL) 10 MG capsule, TAKE 1 CAPSULE BY MOUTH 4 (FOUR) TIMES A DAY BEFORE MEALS & AT BEDTIME., Disp: 120 capsule, Rfl: 1  •  Hydrocortisone, Perianal, (ANUSOL-HC) 2.5 % rectal cream, Insert  into the rectum 2 (Two) Times a Day., Disp: 28 g, Rfl: 2  •  Levonorgest-Eth Estrad 91-Day 0.15-0.03 &0.01 MG tablet, TAKE 1 TABLET BY MOUTH EVERY DAY, Disp: 28 each, Rfl: 5  •  vitamin D3 125 MCG (5000 UT) capsule capsule, Take 5,000 Units by mouth Daily., Disp: , Rfl:     ROS    Review of Systems   Constitutional: Negative for chills and fever.   Respiratory: Positive for shortness of breath. Negative for cough.    Cardiovascular: Positive for chest pain (pressure and stabbing at times).   Gastrointestinal: Positive for abdominal pain, blood in stool, diarrhea and nausea.   Psychiatric/Behavioral: Positive for depressed  "mood. Negative for sleep disturbance. The patient is nervous/anxious.        Vitals:    07/28/22 1134   BP: 120/80   Pulse: 88   Temp: 97 °F (36.1 °C)   SpO2: 100%   Weight: 61.7 kg (136 lb)   Height: 157.5 cm (62\")     Body mass index is 24.87 kg/m².    Physical Exam     Physical Exam  Constitutional:       General: She is not in acute distress.     Appearance: Normal appearance. She is well-developed.   HENT:      Head: Normocephalic and atraumatic.      Right Ear: External ear normal.      Left Ear: External ear normal.   Eyes:      Extraocular Movements: Extraocular movements intact.      Conjunctiva/sclera: Conjunctivae normal.   Cardiovascular:      Rate and Rhythm: Normal rate and regular rhythm.      Heart sounds: No murmur heard.  Pulmonary:      Effort: Pulmonary effort is normal. No respiratory distress.      Breath sounds: Normal breath sounds. No wheezing.   Abdominal:      General: Bowel sounds are normal. There is no distension.      Palpations: Abdomen is soft.      Tenderness: There is abdominal tenderness.   Musculoskeletal:      Right lower leg: No edema.      Left lower leg: No edema.   Skin:     General: Skin is warm and dry.   Neurological:      Mental Status: She is alert and oriented to person, place, and time.      Cranial Nerves: No cranial nerve deficit.   Psychiatric:         Mood and Affect: Mood normal.         Behavior: Behavior normal.         Assessment/Plan    Problems Addressed this Visit     Depression with anxiety - Primary     Improved with current medication.  Will increase celexa for additional benefit.          Relevant Medications    citalopram (CeleXA) 40 MG tablet    RUQ abdominal pain     Secondary to gallbladder dysfunction.  She has a appt with general surgery next week.           Hematochezia     Felt to be secondary to hemorrhoids.  However, due to persistent symptoms, labs were ordered last week.  She did test positive for ANCA, concerning for underlying UC.  She " would benefit from a colonoscopy as well.          Internal hemorrhoid     Likely secondary to Gi irregularities.  May use anusol rectal cream previously prescribed.  May take miralax and/or colace prn constipation.              New Medications Ordered This Visit   Medications   • citalopram (CeleXA) 40 MG tablet     Sig: Take 1 tablet by mouth Daily.     Dispense:  90 tablet     Refill:  1       No orders of the defined types were placed in this encounter.      Return in about 3 months (around 10/28/2022) for anxiety and depression.    Edie Grant, DO

## 2022-07-29 LAB
BACTERIA SPEC CULT: NORMAL
BACTERIA SPEC CULT: NORMAL
CAMPYLOBACTER STL CULT: NORMAL
E COLI SXT STL QL IA: NEGATIVE
O+P SPEC MICRO: NORMAL
O+P STL CONC: NORMAL
SALM + SHIG STL CULT: NORMAL
SPECIMEN STATUS: NORMAL

## 2022-07-31 NOTE — ASSESSMENT & PLAN NOTE
Felt to be secondary to hemorrhoids.  However, due to persistent symptoms, labs were ordered last week.  She did test positive for ANCA, concerning for underlying UC.  She would benefit from a colonoscopy as well.

## 2022-07-31 NOTE — ASSESSMENT & PLAN NOTE
Likely secondary to Gi irregularities.  May use anusol rectal cream previously prescribed.  May take miralax and/or colace prn constipation.

## 2022-08-03 NOTE — PROGRESS NOTES
Subjective   Rachel Steel is a 22 y.o. female.   Chief Complaint   Patient presents with   • Abdominal Pain     Right upper   • Nausea       History of Present Illness   Ms. Steel is a  21 yo female patient who comes to the office today for evaluation of intermittent RUQ abdominal pain and nausea.  She reports that the symptoms have been present for 2-3 months. An ultrasound of the RUQ was completed at the [facility] on 6/10/2022 and was normal.  She then underwent a HIDA scan at [facility] with a diminished EF of 19%. She describes her pain as occurring post-prandially.  She reports exacerbation with greasy and spicy foods.  She denies fevers, chills, acholic stools, dark urine, or jaundice.  She reports that her symptoms have failed to resolve with dietary changes.  She has also had no improvement with bentyl.     The following portions of the patient's history were reviewed and updated as appropriate: allergies, current medications, past family history, past medical history, past social history, past surgical history and problem list.    Patient Active Problem List   Diagnosis   • Asthma   • Depression with anxiety   • Dysmenorrhea   • Vitamin D deficiency   • Oral contraceptive pill surveillance   • RUQ abdominal pain   • Suprapubic tenderness   • Screening examination for STD (sexually transmitted disease)   • Female dyspareunia   • Dyschezia   • Hematochezia   • Internal hemorrhoid   • Biliary dyskinesia       Past Medical History:   Diagnosis Date   • Anemia    • Anxiety    • Depression    • History of stomach ulcers        Past Surgical History:   Procedure Laterality Date   • ENDOSCOPY     • WISDOM TOOTH EXTRACTION         Medications:     Current Outpatient Medications:   •  citalopram (CeleXA) 40 MG tablet, Take 1 tablet by mouth Daily., Disp: 90 tablet, Rfl: 1  •  dicyclomine (BENTYL) 10 MG capsule, TAKE 1 CAPSULE BY MOUTH 4 (FOUR) TIMES A DAY BEFORE MEALS & AT BEDTIME.,  Disp: 120 capsule, Rfl: 1  •  Levonorgest-Eth Estrad 91-Day 0.15-0.03 &0.01 MG tablet, TAKE 1 TABLET BY MOUTH EVERY DAY, Disp: 28 each, Rfl: 5  •  vitamin D3 125 MCG (5000 UT) capsule capsule, Take 5,000 Units by mouth Daily., Disp: , Rfl:   •  ondansetron ODT (ZOFRAN-ODT) 4 MG disintegrating tablet, Place 1 tablet under the tongue Every 8 (Eight) Hours As Needed for Nausea or Vomiting., Disp: 15 tablet, Rfl: 0    Allergies:   Allergies   Allergen Reactions   • Sulfa Antibiotics Rash         Family History   Problem Relation Age of Onset   • Hypertension Father    • Depression Father    • Anxiety disorder Father    • Thyroid disease Mother    • Depression Mother    • Narcolepsy Mother    • Anxiety disorder Mother    • Other Brother         suspected autism   • Heart disease Paternal Grandfather    • Heart disease Maternal Grandmother         probable   • Diabetes Maternal Grandfather    • Colon cancer Maternal Aunt    • Cancer Maternal Aunt         colon   • Arthritis Other    • Thyroid disease Other    • Diabetes Other    • Hypertension Other    • Mental illness Other        Social History     Socioeconomic History   • Marital status:    • Number of children: 0   Tobacco Use   • Smoking status: Never Smoker   • Smokeless tobacco: Never Used   Vaping Use   • Vaping Use: Never used   Substance and Sexual Activity   • Alcohol use: Never   • Drug use: Never   • Sexual activity: Yes     Partners: Male     Birth control/protection: OCP       Review of Systems   Constitutional: Negative for chills, fever and unexpected weight change.   HENT: Negative for hearing loss, trouble swallowing and voice change.    Eyes: Negative for visual disturbance.   Respiratory: Negative for apnea, cough, chest tightness, shortness of breath and wheezing.    Cardiovascular: Negative for chest pain, palpitations and leg swelling.   Gastrointestinal: Positive for abdominal pain, constipation, diarrhea and nausea. Negative for abdominal  "distention, anal bleeding, blood in stool, rectal pain and vomiting.   Endocrine: Negative for cold intolerance and heat intolerance.   Genitourinary: Negative for difficulty urinating, dysuria and flank pain.   Musculoskeletal: Negative for back pain and gait problem.   Skin: Negative for color change, rash and wound.   Neurological: Negative for dizziness, syncope, speech difficulty, weakness, light-headedness, numbness and headaches.   Hematological: Negative for adenopathy. Does not bruise/bleed easily.   Psychiatric/Behavioral: Negative for confusion. The patient is not nervous/anxious.      I have reviewed and confirmed the accuracy of the ROS as documented by the MA/LPN/RN Abbi Reynolds MD      Objective    /68   Pulse 90   Temp 98 °F (36.7 °C)   Ht 157.5 cm (62\")   Wt 64 kg (141 lb)   LMP 07/26/2022 (Approximate)   SpO2 98%   BMI 25.79 kg/m²     Physical Exam  Constitutional:       Appearance: She is well-developed.   HENT:      Head: Normocephalic and atraumatic.   Eyes:      General: No scleral icterus.  Cardiovascular:      Rate and Rhythm: Regular rhythm.   Pulmonary:      Effort: Pulmonary effort is normal.   Abdominal:      General: There is no distension.      Palpations: Abdomen is soft.      Tenderness: There is no abdominal tenderness.   Skin:     General: Skin is warm and dry.   Neurological:      Mental Status: She is alert and oriented to person, place, and time.   Psychiatric:         Behavior: Behavior normal.       Outside Records:  I have taken the opportunity to review the patient's available outside records in paper format, scanned format and those available on Care Everywhere    Results Review:  I have reviewed the entirety of the patient's clinical lab results.  I have also personally reviewed the relevant patient imaging.     NUCLEAR MEDICINE HIDA SCAN WITH PHARMACOLOGICAL INTERVENTION     HEPATOBILIARY SCAN WITH GALLBLADDER EJECTION FRACTION     HISTORY: RUQ abdominal " pain, US nondiagnostic; R10.11-Right upper  quadrant pain.     TECHNIQUE: Following intravenous administration of approximately 5.8 mCi  of TC Choletec, multiple scintigraphic images were obtained.     FINDINGS: The hepatic parenchymal phase shows homogeneous distribution  of tracer throughout the liver. Prompt appearance of tracer is noted in  the intrahepatic and extrahepatic biliary systems. The gallbladder  visualizes normally. Biliary to bowel transit is within normal limits.     Following ingestion of fatty meal, gallbladder ejection is estimated to  be 19%.     IMPRESSION:  1. Normal visualization of activity within biliary tree, gallbladder and  drainage into small bowel.  2. Abnormally low gallbladder ejection fraction of 19%.     This report was signed and finalized on 6/24/2022 5:08 PM by José Manuel Gamble MD.    Assessment & Plan   Diagnoses and all orders for this visit:    1. Biliary dyskinesia (Primary)    2. Pre-op testing  -     COVID PRE-OP / PRE-PROCEDURE SCREENING ORDER (NO ISOLATION) - Swab, Nasopharynx; Future     I recommended to the patient that we proceed with laparoscopic cholecystectomy for definitive management of symptoms related to underlying gallbladder disease, which have failed to respond to medical management.  We discussed the laparoscopic cholecystectomy procedure in detail along with the risks, benefits, and alternatives.  We specifically discussed the risks of bleeding, infection, conversion to an open procedure, postoperative bile leak, common bile duct injury, the need for ERCP (in the setting of bile leak, choledocholithiasis, etc.), and the risks related to anesthesia.  The patient understands these, and is willing to proceed.  My office will facilitate scheduling, and preadmission testing.  The patient understands the need to be n.p.o. after midnight the evening prior to scheduled surgical procedure.

## 2022-08-04 ENCOUNTER — OFFICE VISIT (OUTPATIENT)
Dept: SURGERY | Facility: CLINIC | Age: 22
End: 2022-08-04

## 2022-08-04 VITALS
HEIGHT: 62 IN | SYSTOLIC BLOOD PRESSURE: 112 MMHG | TEMPERATURE: 98 F | WEIGHT: 141 LBS | OXYGEN SATURATION: 98 % | BODY MASS INDEX: 25.95 KG/M2 | HEART RATE: 90 BPM | DIASTOLIC BLOOD PRESSURE: 68 MMHG

## 2022-08-04 DIAGNOSIS — K82.8 BILIARY DYSKINESIA: Primary | ICD-10-CM

## 2022-08-04 DIAGNOSIS — Z01.818 PRE-OP TESTING: ICD-10-CM

## 2022-08-04 PROCEDURE — 99203 OFFICE O/P NEW LOW 30 MIN: CPT | Performed by: SURGERY

## 2022-08-11 ENCOUNTER — PREP FOR SURGERY (OUTPATIENT)
Dept: OTHER | Facility: HOSPITAL | Age: 22
End: 2022-08-11

## 2022-08-11 DIAGNOSIS — K82.8 BILIARY DYSKINESIA: Primary | ICD-10-CM

## 2022-08-11 RX ORDER — HEPARIN SODIUM 5000 [USP'U]/ML
5000 INJECTION, SOLUTION INTRAVENOUS; SUBCUTANEOUS ONCE
Status: CANCELLED | OUTPATIENT
Start: 2022-08-11 | End: 2022-08-11

## 2022-08-11 RX ORDER — SODIUM CHLORIDE 0.9 % (FLUSH) 0.9 %
10 SYRINGE (ML) INJECTION AS NEEDED
Status: CANCELLED | OUTPATIENT
Start: 2022-08-11

## 2022-08-11 RX ORDER — SODIUM CHLORIDE, SODIUM LACTATE, POTASSIUM CHLORIDE, CALCIUM CHLORIDE 600; 310; 30; 20 MG/100ML; MG/100ML; MG/100ML; MG/100ML
50 INJECTION, SOLUTION INTRAVENOUS CONTINUOUS
Status: CANCELLED | OUTPATIENT
Start: 2022-08-11

## 2022-08-11 RX ORDER — SODIUM CHLORIDE 0.9 % (FLUSH) 0.9 %
10 SYRINGE (ML) INJECTION EVERY 12 HOURS SCHEDULED
Status: CANCELLED | OUTPATIENT
Start: 2022-08-11

## 2022-08-16 PROBLEM — K82.8 BILIARY DYSKINESIA: Status: ACTIVE | Noted: 2022-08-16

## 2022-08-19 ENCOUNTER — OFFICE VISIT (OUTPATIENT)
Dept: INTERNAL MEDICINE | Facility: CLINIC | Age: 22
End: 2022-08-19

## 2022-08-19 VITALS
HEART RATE: 108 BPM | DIASTOLIC BLOOD PRESSURE: 80 MMHG | BODY MASS INDEX: 26.5 KG/M2 | OXYGEN SATURATION: 98 % | WEIGHT: 144 LBS | HEIGHT: 62 IN | SYSTOLIC BLOOD PRESSURE: 120 MMHG | TEMPERATURE: 98.1 F

## 2022-08-19 DIAGNOSIS — R30.0 BURNING WITH URINATION: ICD-10-CM

## 2022-08-19 DIAGNOSIS — N30.01 ACUTE CYSTITIS WITH HEMATURIA: Primary | ICD-10-CM

## 2022-08-19 DIAGNOSIS — N92.3 SPOTTING BETWEEN MENSES: ICD-10-CM

## 2022-08-19 LAB
B-HCG UR QL: NEGATIVE
BILIRUB BLD-MCNC: NEGATIVE MG/DL
CLARITY, POC: CLEAR
COLOR UR: ABNORMAL
EXPIRATION DATE: ABNORMAL
EXPIRATION DATE: NORMAL
GLUCOSE UR STRIP-MCNC: NEGATIVE MG/DL
INTERNAL NEGATIVE CONTROL: NORMAL
INTERNAL POSITIVE CONTROL: NORMAL
KETONES UR QL: ABNORMAL
LEUKOCYTE EST, POC: ABNORMAL
Lab: ABNORMAL
Lab: NORMAL
NITRITE UR-MCNC: NEGATIVE MG/ML
PH UR: 6 [PH] (ref 5–8)
PROT UR STRIP-MCNC: ABNORMAL MG/DL
RBC # UR STRIP: ABNORMAL /UL
SP GR UR: 1.02 (ref 1–1.03)
UROBILINOGEN UR QL: NORMAL

## 2022-08-19 PROCEDURE — 81025 URINE PREGNANCY TEST: CPT | Performed by: FAMILY MEDICINE

## 2022-08-19 PROCEDURE — 81003 URINALYSIS AUTO W/O SCOPE: CPT | Performed by: FAMILY MEDICINE

## 2022-08-19 PROCEDURE — 99214 OFFICE O/P EST MOD 30 MIN: CPT | Performed by: FAMILY MEDICINE

## 2022-08-19 RX ORDER — CEFDINIR 300 MG/1
300 CAPSULE ORAL 2 TIMES DAILY
Qty: 20 CAPSULE | Refills: 0 | Status: SHIPPED | OUTPATIENT
Start: 2022-08-19 | End: 2022-09-15

## 2022-08-19 NOTE — PROGRESS NOTES
Rachel Steel is a 22 y.o. female.    Chief Complaint   Patient presents with   • Headache     Have been having headache for like 3 weeks, went and got a Toradol shot and then woke up to the headache the next morning.    • Menstrual Problem   • Urinary Tract Infection       HPI   She reports headache for about 3 weeks.  Admits to nasal congestion in the beginning.  That has resolved.  Frontal headache has remained.  She reports occasional dysuria.  She reports symptoms are very intermittent.  Admits to increased frequency.  She does admit to back pain as well.  She reports she had a period about 3 weeks ago and has them only every 3 months.  However, she has had some intermittent spotting.  Admits to nausea as well and is concerning with possible pregnancy.    The following portions of the patient's history were reviewed and updated as appropriate: allergies, current medications, past family history, past medical history, past social history, past surgical history and problem list.     Allergies   Allergen Reactions   • Sulfa Antibiotics Rash         Current Outpatient Medications:   •  citalopram (CeleXA) 40 MG tablet, Take 1 tablet by mouth Daily., Disp: 90 tablet, Rfl: 1  •  dicyclomine (BENTYL) 10 MG capsule, TAKE 1 CAPSULE BY MOUTH 4 (FOUR) TIMES A DAY BEFORE MEALS & AT BEDTIME., Disp: 120 capsule, Rfl: 1  •  Levonorgest-Eth Estrad 91-Day 0.15-0.03 &0.01 MG tablet, TAKE 1 TABLET BY MOUTH EVERY DAY, Disp: 28 each, Rfl: 5  •  vitamin D3 125 MCG (5000 UT) capsule capsule, Take 5,000 Units by mouth Daily., Disp: , Rfl:   •  cefdinir (OMNICEF) 300 MG capsule, Take 1 capsule by mouth 2 (Two) Times a Day., Disp: 20 capsule, Rfl: 0  •  ondansetron ODT (ZOFRAN-ODT) 4 MG disintegrating tablet, Place 1 tablet under the tongue Every 8 (Eight) Hours As Needed for Nausea or Vomiting., Disp: 15 tablet, Rfl: 0    ROS    Review of Systems   Constitutional: Negative for chills and fever.   HENT: Negative for congestion.   "  Respiratory: Negative for cough and shortness of breath.    Cardiovascular: Positive for chest pain.   Gastrointestinal: Positive for abdominal pain, diarrhea and nausea. Negative for blood in stool and constipation.   Neurological: Positive for headache.       Vitals:    08/19/22 1401   BP: 120/80   Pulse: 108   Temp: 98.1 °F (36.7 °C)   SpO2: 98%   Weight: 65.3 kg (144 lb)   Height: 157.5 cm (62\")     Body mass index is 26.34 kg/m².    Physical Exam     Physical Exam  Constitutional:       General: She is not in acute distress.     Appearance: Normal appearance. She is well-developed.   HENT:      Head: Normocephalic and atraumatic.      Right Ear: External ear normal.      Left Ear: External ear normal.   Eyes:      Extraocular Movements: Extraocular movements intact.      Conjunctiva/sclera: Conjunctivae normal.   Cardiovascular:      Rate and Rhythm: Normal rate and regular rhythm.      Heart sounds: No murmur heard.  Pulmonary:      Effort: Pulmonary effort is normal. No respiratory distress.      Breath sounds: Normal breath sounds. No wheezing.   Abdominal:      General: Bowel sounds are normal. There is no distension.      Palpations: Abdomen is soft.      Tenderness: There is abdominal tenderness. There is no right CVA tenderness or left CVA tenderness.   Musculoskeletal:         General: Tenderness (left lower back) present.   Skin:     General: Skin is warm and dry.   Neurological:      Mental Status: She is alert and oriented to person, place, and time.      Cranial Nerves: No cranial nerve deficit.   Psychiatric:         Mood and Affect: Mood normal.         Behavior: Behavior normal.         Assessment/Plan    Problems Addressed this Visit    None     Visit Diagnoses     Acute cystitis with hematuria    -  Primary    Relevant Orders    Urine Culture - Urine, Urine, Clean Catch    Burning with urination        Relevant Orders    POCT urinalysis dipstick, automated (Completed)    Spotting between " menses        Relevant Orders    POCT pregnancy, urine (Completed)    HCG, B-subunit, Quantitative (Completed)        Urine pregnancy test negative.  Will obtain serum pregnancy test for reassurance.  Advised all symptoms may be related to UTI.  Will treat with omnicef and send urine for culture.      New Medications Ordered This Visit   Medications   • cefdinir (OMNICEF) 300 MG capsule     Sig: Take 1 capsule by mouth 2 (Two) Times a Day.     Dispense:  20 capsule     Refill:  0       No orders of the defined types were placed in this encounter.      Return if symptoms worsen or fail to improve.    Edie Grant, DO

## 2022-08-20 LAB — HCG INTACT+B SERPL-ACNC: <1 MIU/ML

## 2022-08-21 LAB
BACTERIA UR CULT: NORMAL
BACTERIA UR CULT: NORMAL

## 2022-08-22 RX ORDER — DICYCLOMINE HYDROCHLORIDE 10 MG/1
10 CAPSULE ORAL
Qty: 120 CAPSULE | Refills: 1 | Status: SHIPPED | OUTPATIENT
Start: 2022-08-22 | End: 2023-02-02

## 2022-08-23 ENCOUNTER — TELEPHONE (OUTPATIENT)
Dept: PREADMISSION TESTING | Facility: HOSPITAL | Age: 22
End: 2022-08-23

## 2022-08-23 ENCOUNTER — TELEPHONE (OUTPATIENT)
Dept: SURGERY | Facility: CLINIC | Age: 22
End: 2022-08-23

## 2022-08-23 NOTE — TELEPHONE ENCOUNTER
LVM for patient to call and confirm Lap Ada scheduled 8/26/2022.  Post op scheduled 9/1/2022 at 9:00.

## 2022-08-24 ENCOUNTER — PRE-ADMISSION TESTING (OUTPATIENT)
Dept: PREADMISSION TESTING | Facility: HOSPITAL | Age: 22
End: 2022-08-24

## 2022-08-24 VITALS — BODY MASS INDEX: 26.94 KG/M2 | WEIGHT: 146.4 LBS | HEIGHT: 62 IN

## 2022-08-24 DIAGNOSIS — Z01.818 PRE-OP TESTING: ICD-10-CM

## 2022-08-24 LAB
B-HCG UR QL: NEGATIVE
DEPRECATED RDW RBC AUTO: 43.2 FL (ref 37–54)
ERYTHROCYTE [DISTWIDTH] IN BLOOD BY AUTOMATED COUNT: 13.3 % (ref 12.3–15.4)
HCT VFR BLD AUTO: 38.1 % (ref 34–46.6)
HGB BLD-MCNC: 12.6 G/DL (ref 12–15.9)
MCH RBC QN AUTO: 29 PG (ref 26.6–33)
MCHC RBC AUTO-ENTMCNC: 33.1 G/DL (ref 31.5–35.7)
MCV RBC AUTO: 87.8 FL (ref 79–97)
PLATELET # BLD AUTO: 435 10*3/MM3 (ref 140–450)
PMV BLD AUTO: 9.4 FL (ref 6–12)
RBC # BLD AUTO: 4.34 10*6/MM3 (ref 3.77–5.28)
WBC NRBC COR # BLD: 10.74 10*3/MM3 (ref 3.4–10.8)

## 2022-08-24 PROCEDURE — C9803 HOPD COVID-19 SPEC COLLECT: HCPCS

## 2022-08-24 PROCEDURE — 36415 COLL VENOUS BLD VENIPUNCTURE: CPT

## 2022-08-24 PROCEDURE — 85027 COMPLETE CBC AUTOMATED: CPT

## 2022-08-24 PROCEDURE — U0004 COV-19 TEST NON-CDC HGH THRU: HCPCS

## 2022-08-24 PROCEDURE — 81025 URINE PREGNANCY TEST: CPT

## 2022-08-25 LAB — SARS-COV-2 RNA NOSE QL NAA+PROBE: NOT DETECTED

## 2022-08-26 ENCOUNTER — HOSPITAL ENCOUNTER (OUTPATIENT)
Facility: HOSPITAL | Age: 22
Setting detail: HOSPITAL OUTPATIENT SURGERY
Discharge: HOME | End: 2022-08-26
Attending: SURGERY | Admitting: SURGERY

## 2022-08-26 ENCOUNTER — ANESTHESIA (OUTPATIENT)
Dept: PERIOP | Facility: HOSPITAL | Age: 22
End: 2022-08-26

## 2022-08-26 ENCOUNTER — ANESTHESIA EVENT (OUTPATIENT)
Dept: PERIOP | Facility: HOSPITAL | Age: 22
End: 2022-08-26

## 2022-08-26 VITALS
DIASTOLIC BLOOD PRESSURE: 79 MMHG | SYSTOLIC BLOOD PRESSURE: 119 MMHG | HEART RATE: 83 BPM | OXYGEN SATURATION: 96 % | TEMPERATURE: 98.1 F | RESPIRATION RATE: 18 BRPM

## 2022-08-26 DIAGNOSIS — K82.8 BILIARY DYSKINESIA: ICD-10-CM

## 2022-08-26 PROCEDURE — 25010000002 DEXAMETHASONE PER 1 MG: Performed by: NURSE ANESTHETIST, CERTIFIED REGISTERED

## 2022-08-26 PROCEDURE — 25010000002 MIDAZOLAM PER 1MG: Performed by: NURSE ANESTHETIST, CERTIFIED REGISTERED

## 2022-08-26 PROCEDURE — 0 AMPICILLIN-SULBACTAM PER 1.5 G

## 2022-08-26 PROCEDURE — 25010000002 HYDROMORPHONE 1 MG/ML SOLUTION: Performed by: NURSE ANESTHETIST, CERTIFIED REGISTERED

## 2022-08-26 PROCEDURE — 25010000002 FENTANYL CITRATE (PF) 100 MCG/2ML SOLUTION: Performed by: NURSE ANESTHETIST, CERTIFIED REGISTERED

## 2022-08-26 PROCEDURE — 47562 LAPAROSCOPIC CHOLECYSTECTOMY: CPT | Performed by: SURGERY

## 2022-08-26 PROCEDURE — 25010000002 ONDANSETRON PER 1 MG: Performed by: NURSE ANESTHETIST, CERTIFIED REGISTERED

## 2022-08-26 PROCEDURE — 0 LIDOCAINE 1 % SOLUTION: Performed by: SURGERY

## 2022-08-26 PROCEDURE — 25010000002 KETOROLAC TROMETHAMINE PER 15 MG: Performed by: NURSE ANESTHETIST, CERTIFIED REGISTERED

## 2022-08-26 PROCEDURE — 25010000002 METOCLOPRAMIDE PER 10 MG: Performed by: NURSE ANESTHETIST, CERTIFIED REGISTERED

## 2022-08-26 PROCEDURE — 25010000002 HEPARIN (PORCINE) PER 1000 UNITS: Performed by: SURGERY

## 2022-08-26 PROCEDURE — 25010000002 PROPOFOL 200 MG/20ML EMULSION: Performed by: NURSE ANESTHETIST, CERTIFIED REGISTERED

## 2022-08-26 DEVICE — LIGAMAX 5 MM ENDOSCOPIC MULTIPLE CLIP APPLIER
Type: IMPLANTABLE DEVICE | Site: ABDOMEN | Status: FUNCTIONAL
Brand: LIGAMAX

## 2022-08-26 RX ORDER — FENTANYL CITRATE 50 UG/ML
INJECTION, SOLUTION INTRAMUSCULAR; INTRAVENOUS AS NEEDED
Status: DISCONTINUED | OUTPATIENT
Start: 2022-08-26 | End: 2022-08-26 | Stop reason: SURG

## 2022-08-26 RX ORDER — METOCLOPRAMIDE HYDROCHLORIDE 5 MG/ML
INJECTION INTRAMUSCULAR; INTRAVENOUS AS NEEDED
Status: DISCONTINUED | OUTPATIENT
Start: 2022-08-26 | End: 2022-08-26 | Stop reason: SURG

## 2022-08-26 RX ORDER — ONDANSETRON 2 MG/ML
4 INJECTION INTRAMUSCULAR; INTRAVENOUS ONCE AS NEEDED
Status: DISCONTINUED | OUTPATIENT
Start: 2022-08-26 | End: 2022-08-26 | Stop reason: HOSPADM

## 2022-08-26 RX ORDER — MIDAZOLAM HYDROCHLORIDE 2 MG/2ML
2 INJECTION, SOLUTION INTRAMUSCULAR; INTRAVENOUS ONCE
Status: DISCONTINUED | OUTPATIENT
Start: 2022-08-26 | End: 2022-08-26 | Stop reason: HOSPADM

## 2022-08-26 RX ORDER — LORAZEPAM 2 MG/ML
1 INJECTION INTRAMUSCULAR EVERY 4 HOURS PRN
Status: DISCONTINUED | OUTPATIENT
Start: 2022-08-26 | End: 2022-08-26 | Stop reason: HOSPADM

## 2022-08-26 RX ORDER — SODIUM CHLORIDE 0.9 % (FLUSH) 0.9 %
10 SYRINGE (ML) INJECTION AS NEEDED
Status: DISCONTINUED | OUTPATIENT
Start: 2022-08-26 | End: 2022-08-26 | Stop reason: HOSPADM

## 2022-08-26 RX ORDER — BUPIVACAINE HYDROCHLORIDE 2.5 MG/ML
INJECTION, SOLUTION EPIDURAL; INFILTRATION; INTRACAUDAL AS NEEDED
Status: DISCONTINUED | OUTPATIENT
Start: 2022-08-26 | End: 2022-08-26 | Stop reason: HOSPADM

## 2022-08-26 RX ORDER — LIDOCAINE HYDROCHLORIDE 20 MG/ML
INJECTION, SOLUTION INTRAVENOUS AS NEEDED
Status: DISCONTINUED | OUTPATIENT
Start: 2022-08-26 | End: 2022-08-26 | Stop reason: SURG

## 2022-08-26 RX ORDER — MEPERIDINE HYDROCHLORIDE 25 MG/ML
25 INJECTION INTRAMUSCULAR; INTRAVENOUS; SUBCUTANEOUS
Status: DISCONTINUED | OUTPATIENT
Start: 2022-08-26 | End: 2022-08-26 | Stop reason: HOSPADM

## 2022-08-26 RX ORDER — MIDAZOLAM HYDROCHLORIDE 2 MG/2ML
INJECTION, SOLUTION INTRAMUSCULAR; INTRAVENOUS AS NEEDED
Status: DISCONTINUED | OUTPATIENT
Start: 2022-08-26 | End: 2022-08-26 | Stop reason: SURG

## 2022-08-26 RX ORDER — HEPARIN SODIUM 5000 [USP'U]/ML
5000 INJECTION, SOLUTION INTRAVENOUS; SUBCUTANEOUS ONCE
Status: COMPLETED | OUTPATIENT
Start: 2022-08-26 | End: 2022-08-26

## 2022-08-26 RX ORDER — ONDANSETRON 2 MG/ML
INJECTION INTRAMUSCULAR; INTRAVENOUS AS NEEDED
Status: DISCONTINUED | OUTPATIENT
Start: 2022-08-26 | End: 2022-08-26 | Stop reason: SURG

## 2022-08-26 RX ORDER — HYDROCODONE BITARTRATE AND ACETAMINOPHEN 7.5; 325 MG/1; MG/1
1 TABLET ORAL ONCE AS NEEDED
Status: DISCONTINUED | OUTPATIENT
Start: 2022-08-26 | End: 2022-08-26 | Stop reason: HOSPADM

## 2022-08-26 RX ORDER — HYDROCODONE BITARTRATE AND ACETAMINOPHEN 7.5; 325 MG/1; MG/1
1 TABLET ORAL EVERY 4 HOURS PRN
Qty: 15 TABLET | Refills: 0 | Status: SHIPPED | OUTPATIENT
Start: 2022-08-26 | End: 2022-11-01 | Stop reason: HOSPADM

## 2022-08-26 RX ORDER — SODIUM CHLORIDE, SODIUM LACTATE, POTASSIUM CHLORIDE, CALCIUM CHLORIDE 600; 310; 30; 20 MG/100ML; MG/100ML; MG/100ML; MG/100ML
50 INJECTION, SOLUTION INTRAVENOUS CONTINUOUS
Status: DISCONTINUED | OUTPATIENT
Start: 2022-08-26 | End: 2022-08-26 | Stop reason: HOSPADM

## 2022-08-26 RX ORDER — PROPOFOL 10 MG/ML
INJECTION, EMULSION INTRAVENOUS AS NEEDED
Status: DISCONTINUED | OUTPATIENT
Start: 2022-08-26 | End: 2022-08-26 | Stop reason: SURG

## 2022-08-26 RX ORDER — KETOROLAC TROMETHAMINE 30 MG/ML
INJECTION, SOLUTION INTRAMUSCULAR; INTRAVENOUS AS NEEDED
Status: DISCONTINUED | OUTPATIENT
Start: 2022-08-26 | End: 2022-08-26 | Stop reason: SURG

## 2022-08-26 RX ORDER — DEXAMETHASONE SODIUM PHOSPHATE 4 MG/ML
INJECTION, SOLUTION INTRA-ARTICULAR; INTRALESIONAL; INTRAMUSCULAR; INTRAVENOUS; SOFT TISSUE AS NEEDED
Status: DISCONTINUED | OUTPATIENT
Start: 2022-08-26 | End: 2022-08-26 | Stop reason: SURG

## 2022-08-26 RX ORDER — ROCURONIUM BROMIDE 10 MG/ML
INJECTION, SOLUTION INTRAVENOUS AS NEEDED
Status: DISCONTINUED | OUTPATIENT
Start: 2022-08-26 | End: 2022-08-26 | Stop reason: SURG

## 2022-08-26 RX ORDER — LIDOCAINE HYDROCHLORIDE 10 MG/ML
INJECTION, SOLUTION INFILTRATION; PERINEURAL AS NEEDED
Status: DISCONTINUED | OUTPATIENT
Start: 2022-08-26 | End: 2022-08-26 | Stop reason: HOSPADM

## 2022-08-26 RX ORDER — SODIUM CHLORIDE 0.9 % (FLUSH) 0.9 %
10 SYRINGE (ML) INJECTION EVERY 12 HOURS SCHEDULED
Status: DISCONTINUED | OUTPATIENT
Start: 2022-08-26 | End: 2022-08-26 | Stop reason: HOSPADM

## 2022-08-26 RX ORDER — IPRATROPIUM BROMIDE AND ALBUTEROL SULFATE 2.5; .5 MG/3ML; MG/3ML
3 SOLUTION RESPIRATORY (INHALATION) ONCE
Status: DISCONTINUED | OUTPATIENT
Start: 2022-08-26 | End: 2022-08-26 | Stop reason: HOSPADM

## 2022-08-26 RX ORDER — NEOSTIGMINE METHYLSULFATE 5 MG/5 ML
SYRINGE (ML) INTRAVENOUS AS NEEDED
Status: DISCONTINUED | OUTPATIENT
Start: 2022-08-26 | End: 2022-08-26 | Stop reason: SURG

## 2022-08-26 RX ADMIN — HEPARIN SODIUM 5000 UNITS: 5000 INJECTION INTRAVENOUS; SUBCUTANEOUS at 12:03

## 2022-08-26 RX ADMIN — SODIUM CHLORIDE, POTASSIUM CHLORIDE, SODIUM LACTATE AND CALCIUM CHLORIDE: 600; 310; 30; 20 INJECTION, SOLUTION INTRAVENOUS at 12:50

## 2022-08-26 RX ADMIN — LIDOCAINE HYDROCHLORIDE 100 MG: 20 INJECTION, SOLUTION INTRAVENOUS at 12:11

## 2022-08-26 RX ADMIN — GLYCOPYRROLATE 0.2 MG: 0.2 INJECTION, SOLUTION INTRAMUSCULAR; INTRAVENOUS at 12:11

## 2022-08-26 RX ADMIN — METOCLOPRAMIDE 10 MG: 5 INJECTION, SOLUTION INTRAMUSCULAR; INTRAVENOUS at 12:11

## 2022-08-26 RX ADMIN — ONDANSETRON 4 MG: 2 INJECTION INTRAMUSCULAR; INTRAVENOUS at 12:11

## 2022-08-26 RX ADMIN — DEXAMETHASONE SODIUM PHOSPHATE 8 MG: 4 INJECTION, SOLUTION INTRAMUSCULAR; INTRAVENOUS at 12:11

## 2022-08-26 RX ADMIN — HYDROMORPHONE HYDROCHLORIDE 0.5 MG: 1 INJECTION, SOLUTION INTRAMUSCULAR; INTRAVENOUS; SUBCUTANEOUS at 13:55

## 2022-08-26 RX ADMIN — FENTANYL CITRATE 100 MCG: 50 INJECTION INTRAMUSCULAR; INTRAVENOUS at 12:11

## 2022-08-26 RX ADMIN — ROCURONIUM BROMIDE 50 MG: 10 INJECTION INTRAVENOUS at 12:11

## 2022-08-26 RX ADMIN — SUGAMMADEX 200 MG: 100 INJECTION, SOLUTION INTRAVENOUS at 13:11

## 2022-08-26 RX ADMIN — GLYCOPYRROLATE 0.6 MG: 0.2 INJECTION, SOLUTION INTRAMUSCULAR; INTRAVENOUS at 13:02

## 2022-08-26 RX ADMIN — KETOROLAC TROMETHAMINE 30 MG: 30 INJECTION, SOLUTION INTRAMUSCULAR at 12:11

## 2022-08-26 RX ADMIN — Medication 3 G: at 12:16

## 2022-08-26 RX ADMIN — MIDAZOLAM HYDROCHLORIDE 2 MG: 1 INJECTION, SOLUTION INTRAMUSCULAR; INTRAVENOUS at 12:05

## 2022-08-26 RX ADMIN — SODIUM CHLORIDE, POTASSIUM CHLORIDE, SODIUM LACTATE AND CALCIUM CHLORIDE 50 ML/HR: 600; 310; 30; 20 INJECTION, SOLUTION INTRAVENOUS at 10:06

## 2022-08-26 RX ADMIN — PROPOFOL 200 MG: 10 INJECTION, EMULSION INTRAVENOUS at 12:11

## 2022-08-26 RX ADMIN — Medication 5 MG: at 13:02

## 2022-08-26 NOTE — ANESTHESIA PREPROCEDURE EVALUATION
Anesthesia Evaluation     Patient summary reviewed and Nursing notes reviewed   no history of anesthetic complications:  NPO Solid Status: > 8 hours  NPO Liquid Status: > 8 hours           Airway   Mallampati: II  TM distance: >3 FB  Neck ROM: full  no difficulty expected  Dental - normal exam     Pulmonary - normal exam   (+) asthma,  Cardiovascular - negative cardio ROS and normal exam    Rhythm: regular  Rate: normal        Neuro/Psych  (+) psychiatric history Anxiety and Depression,    GI/Hepatic/Renal/Endo - negative ROS     Musculoskeletal (-) negative ROS    Abdominal    Substance History - negative use     OB/GYN negative ob/gyn ROS         Other - negative ROS                       Anesthesia Plan    ASA 2     general     (Risks and benefits discussed including risk of aspiration, recall and dental damage. All patient questions answered.    Patient told that a breathing tube will be used to manage the airway.    Will continue with plan of care.)  intravenous induction     Anesthetic plan, risks, benefits, and alternatives have been provided, discussed and informed consent has been obtained with: patient.        CODE STATUS:

## 2022-08-26 NOTE — ADDENDUM NOTE
Addendum  created 08/26/22 1458 by Jones Moncada, CRNA    Flowsheet accepted, Intraprocedure Flowsheets edited

## 2022-08-26 NOTE — ANESTHESIA PROCEDURE NOTES
Airway  Urgency: elective    Date/Time: 8/26/2022 12:12 PM  Airway not difficult    General Information and Staff    Patient location during procedure: OR  CRNA/CAA: Jones Moncada CRNA    Indications and Patient Condition  Indications for airway management: airway protection    Preoxygenated: yes  Mask difficulty assessment: 1 - vent by mask    Final Airway Details  Final airway type: endotracheal airway      Successful airway: ETT  Cuffed: yes   Successful intubation technique: direct laryngoscopy  Facilitating devices/methods: intubating stylet  Endotracheal tube insertion site: oral  Blade: Columba  Blade size: 4  ETT size (mm): 7.5  Cormack-Lehane Classification: grade I - full view of glottis  Placement verified by: chest auscultation and capnometry   Measured from: lips  ETT/EBT  to lips (cm): 21  Number of attempts at approach: 1  Assessment: lips, teeth, and gum same as pre-op and atraumatic intubation    Additional Comments  Dentition and Lips as preoperative assessment. Airway placed without complication. ETT cuff inflated to minimal occlusive pressure.

## 2022-08-26 NOTE — ANESTHESIA POSTPROCEDURE EVALUATION
Patient: Rachel Steel    Procedure Summary     Date: 08/26/22 Room / Location: [facility] OR 5 /  [facility]    Anesthesia Start: 1207 Anesthesia Stop: 1314    Procedure: CHOLECYSTECTOMY LAPAROSCOPIC (N/A Abdomen) Diagnosis:       Biliary dyskinesia      (Biliary dyskinesia [K82.8])    Surgeons: Abbi Reynolds MD Provider: Jones Moncada CRNA    Anesthesia Type: general ASA Status: 2          Anesthesia Type: general    Vitals  Vitals Value Taken Time   /73 08/26/22 1335   Temp 97.3 °F (36.3 °C) 08/26/22 1324   Pulse 76 08/26/22 1340   Resp 12 08/26/22 1335   SpO2 100 % 08/26/22 1340   Vitals shown include unvalidated device data.          Post Anesthesia Care and Evaluation    Patient location during evaluation: PACU  Patient participation: complete - patient participated  Level of consciousness: awake  Pain score: 3  Pain management: adequate    Airway patency: patent  Anesthetic complications: No anesthetic complications  PONV Status: controlled  Cardiovascular status: acceptable and stable  Respiratory status: acceptable and face mask  Hydration status: acceptable

## 2022-08-30 LAB — REF LAB TEST METHOD: NORMAL

## 2022-08-31 ENCOUNTER — TELEPHONE (OUTPATIENT)
Dept: SURGERY | Facility: CLINIC | Age: 22
End: 2022-08-31

## 2022-09-09 NOTE — OP NOTE
PROCEDURE DATE: 8/26/2022    SURGEON: Abbi Reynolds MD, FACS    PREOPERATIVE DIAGNOSIS: Biliary dyskinesia    POSTOPERATIVE DIAGNOSIS: Same and chronic cholecystitis    PROCEDURE: Laparoscopic cholecystectomy    ANESTHESIA: general    EBL: 15mL    SPECIMENS: Gallbladder    INDICATIONS FOR THE PROCEDURE: Ms. Steel is a 22-year-old female patient evaluated my office for intermittent right upper quadrant abdominal pain with nausea.  Ultrasound right upper quadrant was unremarkable.  A HIDA scan performed at [facility] later demonstrated a diminished ejection fraction at 19% with reproduction of her abdominal pain.  Her pain was described as being postprandial in nature, and exacerbated by eating.  Despite dietary changes, and avoidance of exacerbating foods, she continued to have ongoing symptoms.  For this reason, a laparoscopic cholecystectomy was recommended for definitive management.  She was counseled accordingly regarding the risks, benefits, and alternatives.  She provided informed consent to proceed and is now being brought to the operating room for the same.    DESCRIPTION OF THE PROCEDURE:  The patient was seen and examined in the preoperative holding area on the day of surgery and there are no changes to the medical history.  The patient was then taken to the operating room and placed supine on the operating table where a timeout is performed using the WHO checklist.  The patient received appropriate preoperative antibiotics as well as subcutaneous heparin for DVT prophylaxis.    Following the satisfactory induction of general anesthesia the patient's abdomen was prepped and draped in the usual sterile fashion.  An incision was made just above the umbilicus and was carried through the soft tissue to the level of the fascia.  The fascia was grasped and elevated between Kocher clamps and incised sharply with a knife.  Entry was confirmed into the peritoneum visually and there was no bowel visible in  the vicinity of this incision.  Two stay sutures of 0 Vicryl were placed in either side of the fascia and a Yates cannula was inserted under direct visualization.  The sutures were used to secure the cannula.    The abdomen was insufflated with carbon dioxide to a pressure of 15 mmHg and the laparoscope was introduced.  The abdomen was inspected and no injuries were noted from initial trocar placement.  Three additional 5 mm ports were then placed in the subxiphoid, right subcostal, and right upper quadrant positions under direct visualization.  The patient was then placed into the reverse Trendelenburg position with the left side down.    Filmy adhesions between the gallbladder and omentum were freed using blunt dissection.  The dome of the gallbladder was then grasped and retracted cephalad up over the dome of the liver.  The infundibulum of the gallbladder was grasped and retracted laterally in order to splay out the triangle of Calot.  The peritoneum overlying the gallbladder infundibulum was incised with Bovie electrocautery and the cystic duct and cystic artery were identified and circumferentially skeletonized.  The cystic duct and cystic artery were completely circumferentially dissected until a critical view of safety was obtained and once this was done they were doubly clipped and divided close to the gallbladder.    The gallbladder was then dissected free from its peritoneal attachments to the liver using Bovie electrocautery.  Hemostasis was ensured using electrocautery.  Once the gallbladder was completed freed from the undersurface of liver was placed into an Endo Catch bag.  The gallbladder fossa was then reinspected and copiously irrigated with saline and hemostasis was ensured.  The cystic duct stump and cystic artery stump were reinspected and noted to be secure.  Following this the upper abdominal trocars were removed under direct visualization and no bleeding was noted.  The laparoscope was  withdrawn and the abdomen was desufflated.  The Yates cannula was removed out of the umbilical port site followed by the gallbladder which was completely contained within the Endo Catch bag.  This was passed off the field as specimen.  The fascia of the umbilical port site was then closed using a 0 Vicryl suture placed in a figure-of-eight fashion ×2.  The skin of all incisions was closed using a 5-0 PDS suture placed in a subcuticular fashion.  0.25% Marcaine was injected into the wounds for postoperative analgesia.  Mastisol and steri strips were applied to the wounds and covered with dry sterile dressings.    There were no immediate complications noted and the procedure was well tolerated by the patient.  All sponge, needle,  and instrument  counts were correct at the conclusion of procedure.  Dr. Reynolds was present scrubbed for the procedure and its entirety.  The patient was awakened from anesthesia and taken to the recovery room in good condition.

## 2022-09-12 NOTE — PROGRESS NOTES
Subjective   Rachel Steel is a 22 y.o. female.   Chief Complaint   Patient presents with   • Post-op     Post -op cholecystectomy           History of Present Illness Patient was seen today for post-op lap carol. Pathology statesMild chronic cholecystitis. Patient states they are doing well and have no complaints.     The following portions of the patient's history were reviewed and updated as appropriate: allergies, current medications, past family history, past medical history, past social history, past surgical history and problem list.    Review of Systems   Constitutional: Negative for activity change, chills, fever and unexpected weight change.   HENT: Negative for hearing loss, trouble swallowing and voice change.    Eyes: Negative for visual disturbance.   Respiratory: Negative for apnea, cough, chest tightness, shortness of breath and wheezing.    Cardiovascular: Negative for chest pain, palpitations and leg swelling.   Gastrointestinal: Negative for abdominal distention, abdominal pain, anal bleeding, blood in stool, constipation, diarrhea, nausea, rectal pain and vomiting.   Endocrine: Negative for cold intolerance and heat intolerance.   Genitourinary: Negative for difficulty urinating, dysuria and flank pain.   Musculoskeletal: Negative for back pain and gait problem.   Skin: Negative for color change, rash and wound.   Neurological: Negative for dizziness, syncope, speech difficulty, weakness, light-headedness, numbness and headaches.   Hematological: Negative for adenopathy. Does not bruise/bleed easily.   Psychiatric/Behavioral: Negative for confusion. The patient is not nervous/anxious.        Objective   Physical Exam    Assessment & Plan   Diagnoses and all orders for this visit:    1. S/P laparoscopic cholecystectomy (Primary)

## 2022-09-15 ENCOUNTER — OFFICE VISIT (OUTPATIENT)
Dept: SURGERY | Facility: CLINIC | Age: 22
End: 2022-09-15

## 2022-09-15 VITALS
SYSTOLIC BLOOD PRESSURE: 118 MMHG | OXYGEN SATURATION: 96 % | HEIGHT: 62 IN | WEIGHT: 142 LBS | TEMPERATURE: 97.6 F | BODY MASS INDEX: 26.13 KG/M2 | HEART RATE: 62 BPM | DIASTOLIC BLOOD PRESSURE: 78 MMHG

## 2022-09-15 DIAGNOSIS — Z90.49 S/P LAPAROSCOPIC CHOLECYSTECTOMY: Primary | ICD-10-CM

## 2022-09-15 PROCEDURE — 99024 POSTOP FOLLOW-UP VISIT: CPT | Performed by: SURGERY

## 2022-09-15 RX ORDER — HYDROCORTISONE 25 MG/G
CREAM TOPICAL
COMMUNITY
Start: 2022-09-04 | End: 2023-02-02

## 2022-09-15 RX ORDER — CITALOPRAM 20 MG/1
TABLET ORAL
Qty: 90 TABLET | Refills: 1 | OUTPATIENT
Start: 2022-09-15

## 2022-10-19 ENCOUNTER — OFFICE VISIT (OUTPATIENT)
Dept: SURGERY | Facility: CLINIC | Age: 22
End: 2022-10-19

## 2022-10-19 ENCOUNTER — PREP FOR SURGERY (OUTPATIENT)
Dept: OTHER | Facility: HOSPITAL | Age: 22
End: 2022-10-19

## 2022-10-19 VITALS
DIASTOLIC BLOOD PRESSURE: 74 MMHG | OXYGEN SATURATION: 98 % | SYSTOLIC BLOOD PRESSURE: 122 MMHG | HEART RATE: 91 BPM | HEIGHT: 62 IN | BODY MASS INDEX: 27.49 KG/M2 | WEIGHT: 149.4 LBS | TEMPERATURE: 98.3 F

## 2022-10-19 DIAGNOSIS — K62.5 BRIGHT RED RECTAL BLEEDING: Primary | ICD-10-CM

## 2022-10-19 PROCEDURE — 99213 OFFICE O/P EST LOW 20 MIN: CPT | Performed by: SURGERY

## 2022-10-19 RX ORDER — SODIUM CHLORIDE 0.9 % (FLUSH) 0.9 %
10 SYRINGE (ML) INJECTION AS NEEDED
Status: CANCELLED | OUTPATIENT
Start: 2022-10-19

## 2022-10-19 RX ORDER — SODIUM CHLORIDE, SODIUM LACTATE, POTASSIUM CHLORIDE, CALCIUM CHLORIDE 600; 310; 30; 20 MG/100ML; MG/100ML; MG/100ML; MG/100ML
50 INJECTION, SOLUTION INTRAVENOUS CONTINUOUS
Status: CANCELLED | OUTPATIENT
Start: 2022-10-19

## 2022-10-19 RX ORDER — POLYETHYLENE GLYCOL 3350 17 G/17G
POWDER, FOR SOLUTION ORAL
Qty: 238 G | Refills: 0 | Status: SHIPPED | OUTPATIENT
Start: 2022-10-19 | End: 2022-11-01 | Stop reason: HOSPADM

## 2022-10-19 RX ORDER — SODIUM CHLORIDE 0.9 % (FLUSH) 0.9 %
10 SYRINGE (ML) INJECTION EVERY 12 HOURS SCHEDULED
Status: CANCELLED | OUTPATIENT
Start: 2022-10-19

## 2022-10-19 RX ORDER — BISACODYL 5 MG
TABLET, DELAYED RELEASE (ENTERIC COATED) ORAL
Qty: 4 TABLET | Refills: 0 | Status: SHIPPED | OUTPATIENT
Start: 2022-10-19 | End: 2022-11-01 | Stop reason: HOSPADM

## 2022-10-20 PROBLEM — K62.5 BRIGHT RED RECTAL BLEEDING: Status: ACTIVE | Noted: 2022-10-20

## 2022-10-27 ENCOUNTER — TELEPHONE (OUTPATIENT)
Dept: SURGERY | Facility: CLINIC | Age: 22
End: 2022-10-27

## 2022-10-31 ENCOUNTER — ANESTHESIA EVENT (OUTPATIENT)
Dept: GASTROENTEROLOGY | Facility: HOSPITAL | Age: 22
End: 2022-10-31

## 2022-11-01 ENCOUNTER — HOSPITAL ENCOUNTER (OUTPATIENT)
Facility: HOSPITAL | Age: 22
Setting detail: HOSPITAL OUTPATIENT SURGERY
Discharge: HOME | End: 2022-11-01
Attending: SURGERY | Admitting: SURGERY

## 2022-11-01 ENCOUNTER — ANESTHESIA (OUTPATIENT)
Dept: GASTROENTEROLOGY | Facility: HOSPITAL | Age: 22
End: 2022-11-01

## 2022-11-01 VITALS
TEMPERATURE: 97.4 F | HEART RATE: 73 BPM | DIASTOLIC BLOOD PRESSURE: 60 MMHG | SYSTOLIC BLOOD PRESSURE: 100 MMHG | RESPIRATION RATE: 16 BRPM | BODY MASS INDEX: 27.42 KG/M2 | WEIGHT: 149 LBS | OXYGEN SATURATION: 98 % | HEIGHT: 62 IN

## 2022-11-01 DIAGNOSIS — K62.5 BRIGHT RED RECTAL BLEEDING: ICD-10-CM

## 2022-11-01 LAB
B-HCG UR QL: NEGATIVE
EXPIRATION DATE: NORMAL
INTERNAL NEGATIVE CONTROL: NORMAL
INTERNAL POSITIVE CONTROL: NORMAL
Lab: NORMAL

## 2022-11-01 PROCEDURE — 25010000002 FENTANYL CITRATE (PF) 50 MCG/ML SOLUTION: Performed by: NURSE ANESTHETIST, CERTIFIED REGISTERED

## 2022-11-01 PROCEDURE — 81025 URINE PREGNANCY TEST: CPT | Performed by: SURGERY

## 2022-11-01 PROCEDURE — 25010000002 PROPOFOL 10 MG/ML EMULSION: Performed by: NURSE ANESTHETIST, CERTIFIED REGISTERED

## 2022-11-01 PROCEDURE — 25010000002 MIDAZOLAM PER 1MG: Performed by: NURSE ANESTHETIST, CERTIFIED REGISTERED

## 2022-11-01 PROCEDURE — 88305 TISSUE EXAM BY PATHOLOGIST: CPT

## 2022-11-01 RX ORDER — SODIUM CHLORIDE 0.9 % (FLUSH) 0.9 %
10 SYRINGE (ML) INJECTION EVERY 12 HOURS SCHEDULED
Status: DISCONTINUED | OUTPATIENT
Start: 2022-11-01 | End: 2022-11-01 | Stop reason: HOSPADM

## 2022-11-01 RX ORDER — MIDAZOLAM HYDROCHLORIDE 2 MG/2ML
INJECTION, SOLUTION INTRAMUSCULAR; INTRAVENOUS AS NEEDED
Status: DISCONTINUED | OUTPATIENT
Start: 2022-11-01 | End: 2022-11-01 | Stop reason: SURG

## 2022-11-01 RX ORDER — SODIUM CHLORIDE, SODIUM LACTATE, POTASSIUM CHLORIDE, CALCIUM CHLORIDE 600; 310; 30; 20 MG/100ML; MG/100ML; MG/100ML; MG/100ML
INJECTION, SOLUTION INTRAVENOUS CONTINUOUS PRN
Status: DISCONTINUED | OUTPATIENT
Start: 2022-11-01 | End: 2022-11-01 | Stop reason: SURG

## 2022-11-01 RX ORDER — SODIUM CHLORIDE 0.9 % (FLUSH) 0.9 %
10 SYRINGE (ML) INJECTION AS NEEDED
Status: DISCONTINUED | OUTPATIENT
Start: 2022-11-01 | End: 2022-11-01 | Stop reason: HOSPADM

## 2022-11-01 RX ORDER — PROPOFOL 10 MG/ML
VIAL (ML) INTRAVENOUS AS NEEDED
Status: DISCONTINUED | OUTPATIENT
Start: 2022-11-01 | End: 2022-11-01 | Stop reason: SURG

## 2022-11-01 RX ORDER — FENTANYL CITRATE 50 UG/ML
INJECTION, SOLUTION INTRAMUSCULAR; INTRAVENOUS AS NEEDED
Status: DISCONTINUED | OUTPATIENT
Start: 2022-11-01 | End: 2022-11-01 | Stop reason: SURG

## 2022-11-01 RX ORDER — SODIUM CHLORIDE, SODIUM LACTATE, POTASSIUM CHLORIDE, CALCIUM CHLORIDE 600; 310; 30; 20 MG/100ML; MG/100ML; MG/100ML; MG/100ML
50 INJECTION, SOLUTION INTRAVENOUS CONTINUOUS
Status: DISCONTINUED | OUTPATIENT
Start: 2022-11-01 | End: 2022-11-01 | Stop reason: HOSPADM

## 2022-11-01 RX ORDER — KETAMINE HCL IN NACL, ISO-OSM 100MG/10ML
SYRINGE (ML) INJECTION AS NEEDED
Status: DISCONTINUED | OUTPATIENT
Start: 2022-11-01 | End: 2022-11-01 | Stop reason: SURG

## 2022-11-01 RX ADMIN — FENTANYL CITRATE 50 MCG: 50 INJECTION, SOLUTION INTRAMUSCULAR; INTRAVENOUS at 08:22

## 2022-11-01 RX ADMIN — MIDAZOLAM HYDROCHLORIDE 2 MG: 1 INJECTION, SOLUTION INTRAMUSCULAR; INTRAVENOUS at 08:18

## 2022-11-01 RX ADMIN — PROPOFOL 50 MG: 10 INJECTION, EMULSION INTRAVENOUS at 08:24

## 2022-11-01 RX ADMIN — PROPOFOL 50 MG: 10 INJECTION, EMULSION INTRAVENOUS at 08:26

## 2022-11-01 RX ADMIN — PROPOFOL 50 MG: 10 INJECTION, EMULSION INTRAVENOUS at 08:30

## 2022-11-01 RX ADMIN — SODIUM CHLORIDE, POTASSIUM CHLORIDE, SODIUM LACTATE AND CALCIUM CHLORIDE: 600; 310; 30; 20 INJECTION, SOLUTION INTRAVENOUS at 07:46

## 2022-11-01 RX ADMIN — PROPOFOL 50 MG: 10 INJECTION, EMULSION INTRAVENOUS at 08:34

## 2022-11-01 RX ADMIN — PROPOFOL 50 MG: 10 INJECTION, EMULSION INTRAVENOUS at 08:43

## 2022-11-01 RX ADMIN — PROPOFOL 30 MG: 10 INJECTION, EMULSION INTRAVENOUS at 08:52

## 2022-11-01 RX ADMIN — PROPOFOL 50 MG: 10 INJECTION, EMULSION INTRAVENOUS at 08:22

## 2022-11-01 RX ADMIN — PROPOFOL 50 MG: 10 INJECTION, EMULSION INTRAVENOUS at 08:28

## 2022-11-01 RX ADMIN — SODIUM CHLORIDE, POTASSIUM CHLORIDE, SODIUM LACTATE AND CALCIUM CHLORIDE: 600; 310; 30; 20 INJECTION, SOLUTION INTRAVENOUS at 08:51

## 2022-11-01 RX ADMIN — PROPOFOL 30 MG: 10 INJECTION, EMULSION INTRAVENOUS at 08:48

## 2022-11-01 RX ADMIN — PROPOFOL 50 MG: 10 INJECTION, EMULSION INTRAVENOUS at 08:40

## 2022-11-01 RX ADMIN — Medication 25 MG: at 08:21

## 2022-11-01 RX ADMIN — PROPOFOL 50 MG: 10 INJECTION, EMULSION INTRAVENOUS at 08:37

## 2022-11-01 RX ADMIN — PROPOFOL 50 MG: 10 INJECTION, EMULSION INTRAVENOUS at 08:32

## 2022-11-01 RX ADMIN — PROPOFOL 50 MG: 10 INJECTION, EMULSION INTRAVENOUS at 08:20

## 2022-11-01 RX ADMIN — SODIUM CHLORIDE, POTASSIUM CHLORIDE, SODIUM LACTATE AND CALCIUM CHLORIDE 50 ML/HR: 600; 310; 30; 20 INJECTION, SOLUTION INTRAVENOUS at 07:35

## 2022-11-01 RX ADMIN — FENTANYL CITRATE 50 MCG: 50 INJECTION, SOLUTION INTRAMUSCULAR; INTRAVENOUS at 08:20

## 2022-11-01 NOTE — ANESTHESIA POSTPROCEDURE EVALUATION
Patient: Rachel Steel    Procedure Summary     Date: 11/01/22 Room / Location: [facility] ENDOSCOPY 3 / [facility] ENDOSCOPY    Anesthesia Start: 0817 Anesthesia Stop:     Procedure: COLONOSCOPY WITH BIOPSY Diagnosis:       Bright red rectal bleeding      (Bright red rectal bleeding [K62.5])    Surgeons: Abbi Reynolds MD Provider: Gustabo Arenas CRNA    Anesthesia Type: MAC ASA Status: 2          Anesthesia Type: MAC    Vitals  No vitals data found for the desired time range.          Post Anesthesia Care and Evaluation    Patient location during evaluation: PHASE II  Patient participation: complete - patient participated  Level of consciousness: awake  Pain score: 0  Pain management: adequate    Airway patency: patent  Anesthetic complications: No anesthetic complications  PONV Status: none  Cardiovascular status: acceptable  Respiratory status: acceptable and nasal cannula  Hydration status: acceptable    Comments: vsss resp spont, reflexes intact, responsive, report given to pacu nurse.  See R.N. note for postop vital signs.

## 2022-11-02 LAB — REF LAB TEST METHOD: NORMAL

## 2022-11-11 NOTE — PROGRESS NOTES
"Maico REDD is a 22 y.o. female.   Chief Complaint   Patient presents with   • Follow-up     Follow-up colonoscopy 11/1/2022       History of Present Illness   The patient is in the office today for a follow up after her recent colonoscopy.  The pathology showed no diagnostic abnormality.  Patient denies problems or concerns    The following portions of the patient's history were reviewed and updated as appropriate: allergies, current medications, past family history, past medical history, past social history, past surgical history and problem list.    Review of Systems   Constitutional: Negative for chills, fever and unexpected weight change.   HENT: Negative for hearing loss, trouble swallowing and voice change.    Eyes: Negative for visual disturbance.   Respiratory: Negative for apnea, cough, chest tightness, shortness of breath and wheezing.    Cardiovascular: Negative for chest pain, palpitations and leg swelling.   Gastrointestinal: Negative for abdominal distention, abdominal pain, anal bleeding, blood in stool, constipation, diarrhea, nausea, rectal pain and vomiting.   Endocrine: Negative for cold intolerance and heat intolerance.   Genitourinary: Negative for difficulty urinating, dysuria and flank pain.   Musculoskeletal: Negative for back pain and gait problem.   Skin: Negative for color change, rash and wound.   Neurological: Negative for dizziness, syncope, speech difficulty, weakness, light-headedness, numbness and headaches.   Hematological: Negative for adenopathy. Does not bruise/bleed easily.   Psychiatric/Behavioral: Negative for confusion. The patient is not nervous/anxious.        Objective    /70   Pulse 89   Temp 97.4 °F (36.3 °C)   Ht 157.5 cm (62\")   Wt 70.8 kg (156 lb)   SpO2 98%   BMI 28.53 kg/m²     Physical Exam  Constitutional:       Appearance: She is well-developed.   HENT:      Head: Normocephalic and atraumatic.   Cardiovascular:      Rate and Rhythm: " Regular rhythm.   Pulmonary:      Effort: Pulmonary effort is normal.   Abdominal:      General: There is no distension.      Palpations: Abdomen is soft.      Tenderness: There is no abdominal tenderness.   Skin:     General: Skin is warm and dry.   Neurological:      Mental Status: She is alert and oriented to person, place, and time.   Psychiatric:         Behavior: Behavior normal.         Assessment & Plan   Diagnoses and all orders for this visit:    1. Internal hemorrhoid, bleeding (Primary)

## 2022-11-15 ENCOUNTER — OFFICE VISIT (OUTPATIENT)
Dept: SURGERY | Facility: CLINIC | Age: 22
End: 2022-11-15

## 2022-11-15 VITALS
WEIGHT: 156 LBS | HEIGHT: 62 IN | BODY MASS INDEX: 28.71 KG/M2 | TEMPERATURE: 97.4 F | OXYGEN SATURATION: 98 % | HEART RATE: 89 BPM | SYSTOLIC BLOOD PRESSURE: 116 MMHG | DIASTOLIC BLOOD PRESSURE: 70 MMHG

## 2022-11-15 DIAGNOSIS — K64.8 INTERNAL HEMORRHOID, BLEEDING: Primary | ICD-10-CM

## 2022-11-15 PROCEDURE — 99213 OFFICE O/P EST LOW 20 MIN: CPT | Performed by: SURGERY

## 2022-12-14 ENCOUNTER — OFFICE VISIT (OUTPATIENT)
Dept: OBSTETRICS AND GYNECOLOGY | Facility: CLINIC | Age: 22
End: 2022-12-14
Payer: COMMERCIAL

## 2022-12-14 VITALS
DIASTOLIC BLOOD PRESSURE: 72 MMHG | WEIGHT: 156.8 LBS | HEIGHT: 62 IN | BODY MASS INDEX: 28.85 KG/M2 | SYSTOLIC BLOOD PRESSURE: 108 MMHG

## 2022-12-14 DIAGNOSIS — R39.9 UTI SYMPTOMS: Primary | ICD-10-CM

## 2022-12-14 LAB
BILIRUB BLD-MCNC: NEGATIVE MG/DL
CLARITY, POC: CLEAR
COLOR UR: YELLOW
GLUCOSE UR STRIP-MCNC: NEGATIVE MG/DL
KETONES UR QL: NEGATIVE
LEUKOCYTE EST, POC: NEGATIVE
NITRITE UR-MCNC: NEGATIVE MG/ML
PH UR: 6 [PH] (ref 5–8)
PROT UR STRIP-MCNC: NEGATIVE MG/DL
RBC # UR STRIP: ABNORMAL /UL
SP GR UR: 1.01 (ref 1–1.03)
UROBILINOGEN UR QL: ABNORMAL

## 2022-12-14 PROCEDURE — 81002 URINALYSIS NONAUTO W/O SCOPE: CPT | Performed by: OBSTETRICS & GYNECOLOGY

## 2022-12-14 PROCEDURE — 99212 OFFICE O/P EST SF 10 MIN: CPT | Performed by: OBSTETRICS & GYNECOLOGY

## 2022-12-14 NOTE — PROGRESS NOTES
Chief Complaint   Patient presents with   • Gynecologic Exam       Subjective   HPI  Rachel REDD is a 22 y.o. female, No obstetric history on file.. Her last LMP was Patient's last menstrual period was 10/31/2022. Patient is on birth control and has a period every 3 months. She presents today with c/o recurring UTI's and bloody discharge that comes and goes. Denies any odor.      Symptoms were present when she made appointment but no longer present today.      Additional OB/GYN History     Last Pap : N/A  Last Completed Pap Smear     This patient has no relevant Health Maintenance data.          Last mammogram: N/A  Last Completed Mammogram     This patient has no relevant Health Maintenance data.          Tobacco Usage?: No   OB History    No obstetric history on file.           Current Outpatient Medications:   •  citalopram (CeleXA) 40 MG tablet, Take 1 tablet by mouth Daily., Disp: 90 tablet, Rfl: 1  •  dicyclomine (BENTYL) 10 MG capsule, TAKE 1 CAPSULE BY MOUTH 4 (FOUR) TIMES A DAY BEFORE MEALS & AT BEDTIME., Disp: 120 capsule, Rfl: 1  •  Hydrocortisone, Perianal, (ANUSOL-HC) 2.5 % rectal cream, INSERT INTO THE RECTUM 2 TIMES A DAY., Disp: , Rfl:   •  Levonorgest-Eth Estrad 91-Day 0.15-0.03 &0.01 MG tablet, TAKE 1 TABLET BY MOUTH EVERY DAY, Disp: 28 each, Rfl: 5  •  ondansetron ODT (ZOFRAN-ODT) 4 MG disintegrating tablet, Place 1 tablet under the tongue Every 8 (Eight) Hours As Needed for Nausea or Vomiting., Disp: 15 tablet, Rfl: 0  •  vitamin D3 125 MCG (5000 UT) capsule capsule, Take 5,000 Units by mouth Daily., Disp: , Rfl:      Past Medical History:   Diagnosis Date   • Anemia    • Anxiety    • Asthma     exercise induced   • Body piercing     both ears, left nare   • COVID-19 vaccine series completed     Pfizer x 2   • Depression    • History of stomach ulcers    • Seasonal allergies    • Tattoos    • UTI (urinary tract infection)    • Wears glasses         Past Surgical History:    Procedure Laterality Date   • CHOLECYSTECTOMY N/A 8/26/2022    Procedure: CHOLECYSTECTOMY LAPAROSCOPIC;  Surgeon: Abbi Reynolds MD;  Location: [facility] OR;  Service: General;  Laterality: N/A;   • COLONOSCOPY N/A 11/1/2022    Procedure: COLONOSCOPY WITH BIOPSY;  Surgeon: Abbi Reynolds MD;  Location: [facility] ENDOSCOPY;  Service: Gastroenterology;  Laterality: N/A;   • ENDOSCOPY     • WISDOM TOOTH EXTRACTION         The additional following portions of the patient's history were reviewed and updated as appropriate: allergies, current medications, past family history, past medical history, past social history, past surgical history and problem list.    Review of Systems    I have reviewed and agree with the HPI, ROS, and historical information as entered above. Gayle Reese MD    Objective   /72   Ht 157.5 cm (62.01\")   Wt 71.1 kg (156 lb 12.8 oz)   LMP 10/31/2022   BMI 28.67 kg/m²     Physical Exam    Physical Exam:  General:  well developed; well nourished  no acute distress   Abdomen: soft, non-tender; no masses  no umbilical or inguinal hernias are present   Pelvis: Not performed.  declined     Assessment & Plan     Assessment     Problem List Items Addressed This Visit     UTI symptoms - Primary    Relevant Orders    POC Urinalysis Dipstick (Completed)         Plan     Return for Annual physical.   Reviewed urine dip results.  F/u prn.  Declines need for STD screening.    Gayle Reese MD  12/14/2022

## 2023-01-06 PROBLEM — R39.9 UTI SYMPTOMS: Status: ACTIVE | Noted: 2023-01-06

## 2023-01-10 ENCOUNTER — PATIENT MESSAGE (OUTPATIENT)
Dept: INTERNAL MEDICINE | Facility: CLINIC | Age: 23
End: 2023-01-10
Payer: COMMERCIAL

## 2023-01-10 DIAGNOSIS — R11.0 NAUSEA: ICD-10-CM

## 2023-01-10 RX ORDER — ONDANSETRON 4 MG/1
4 TABLET, ORALLY DISINTEGRATING ORAL EVERY 8 HOURS PRN
Qty: 20 TABLET | Refills: 0 | Status: SHIPPED | OUTPATIENT
Start: 2023-01-10

## 2023-01-11 NOTE — TELEPHONE ENCOUNTER
From: Rachel REDD  To: Edie Grant  Sent: 1/10/2023 11:29 AM EST  Subject: Prescription     Hello, I have a prescription for zofran for nausea but I have ran out of it and wondering if I could get a new one for it to get refilled. I have been having a lot of nausea lately during the day and especially in car rides

## 2023-01-26 ENCOUNTER — TELEPHONE (OUTPATIENT)
Dept: SURGERY | Facility: CLINIC | Age: 23
End: 2023-01-26
Payer: COMMERCIAL

## 2023-02-02 ENCOUNTER — OFFICE VISIT (OUTPATIENT)
Dept: INTERNAL MEDICINE | Facility: CLINIC | Age: 23
End: 2023-02-02
Payer: COMMERCIAL

## 2023-02-02 VITALS
DIASTOLIC BLOOD PRESSURE: 82 MMHG | SYSTOLIC BLOOD PRESSURE: 126 MMHG | WEIGHT: 163 LBS | HEART RATE: 86 BPM | BODY MASS INDEX: 37.72 KG/M2 | OXYGEN SATURATION: 96 % | TEMPERATURE: 98.2 F | HEIGHT: 55 IN

## 2023-02-02 DIAGNOSIS — B37.31 CANDIDIASIS OF VULVA AND VAGINA: Primary | ICD-10-CM

## 2023-02-02 DIAGNOSIS — E55.9 VITAMIN D DEFICIENCY: ICD-10-CM

## 2023-02-02 DIAGNOSIS — F41.8 DEPRESSION WITH ANXIETY: ICD-10-CM

## 2023-02-02 DIAGNOSIS — R53.83 FATIGUE, UNSPECIFIED TYPE: ICD-10-CM

## 2023-02-02 DIAGNOSIS — R35.89 POLYURIA: ICD-10-CM

## 2023-02-02 PROCEDURE — 99214 OFFICE O/P EST MOD 30 MIN: CPT | Performed by: FAMILY MEDICINE

## 2023-02-02 NOTE — PROGRESS NOTES
"Rachel PEREIRA is a 22 y.o. female.    Chief Complaint   Patient presents with   • Urinary Tract Infection     Went to  last week for possible UTI, UC stated bp was high       HPI   Rachel Pereira is a 22-year-old female who presents for a follow-up of a recent urgent care visit for urinary tract infection and elevated blood pressure at that visit.    The patient was evaluated at the urgent care for a possible UTI with noted abdominal cramping and green vaginal discharge. Her urinalysis was sent for culture and was negative for any growth. She was then treated with Diflucan for vaginal and vulva candidiasis. She took the one dose of Diflucan on 01/30/2023 and reports her symptoms have since resolved. She denies dysuria but does admit to nocturia that has occurred over the last 2 months getting up 3-4 times per night to urinate. If she takes AZO before going to bed, she will only get up 2 times per night. The patient admits to an increase in thirst and has increased her fluid intake recently. She does feel like she is fully emptying her bladder each time she goes. She has noticed an increase in hunger that is intermittent in nature. The patient admits to a positive family history of diabetes.     She complains of chest pain that radiates to the left arm with intermittent shortness of breath. The patient states the Celexa is working well for her depression symptoms but she still feels anxious at night and has difficulty falling asleep due to her mind \"wandering.\"  She has been taking Benadryl to help her fall asleep and is easier to go back to sleep after using the restroom.     Her blood pressure while at the urgent care was reported at 124/78 mmHg with a pulse of 105 BPM, today her blood pressure is within normal limits as well at 126/82 mmHg.      The patient shares in the last month she has implemented a healthier diet and has been exercising 4-5 times per day. She has been unable to lose weight and recently " "discontinued taking her contraceptive. She reports changing contraceptives about 6 months ago and has noticed a steady increase in her weight since that time. Her last thyroid level was on the low end of normal.     She confirms that she is still taking 5000 IU of vitamin D daily for vitamin D deficiency. Her last vitamin D level remained low. She admits to feeling fatigued often.     The following portions of the patient's history were reviewed and updated as appropriate: allergies, current medications, past family history, past medical history, past social history, past surgical history and problem list.     Allergies   Allergen Reactions   • Sulfa Antibiotics Rash         Current Outpatient Medications:   •  citalopram (CeleXA) 40 MG tablet, Take 1 tablet by mouth Daily., Disp: 90 tablet, Rfl: 1  •  ondansetron ODT (ZOFRAN-ODT) 4 MG disintegrating tablet, Place 1 tablet under the tongue Every 8 (Eight) Hours As Needed for Nausea or Vomiting., Disp: 20 tablet, Rfl: 0  •  vitamin D3 125 MCG (5000 UT) capsule capsule, Take 5,000 Units by mouth Daily., Disp: , Rfl:     ROS    Review of Systems   Constitutional: Positive for activity change (Has increased her daily exercise.), appetite change (Feels hungry more often. ) and fatigue.   Respiratory: Positive for shortness of breath.    Cardiovascular: Positive for chest pain (With left arm pain.).   Gastrointestinal: Negative for abdominal pain.   Genitourinary: Positive for frequency. Negative for dysuria and vaginal discharge (Resolved.).   Skin: Negative for color change and rash.   Psychiatric/Behavioral: Negative for depressed mood. The patient is nervous/anxious (Mainly at night, states her mind wanders.).        Vitals:    02/02/23 1504   BP: 126/82   Pulse: 86   Temp: 98.2 °F (36.8 °C)   SpO2: 96%   Weight: 73.9 kg (163 lb)   Height: 132.1 cm (52.01\")   PainSc: 0-No pain     Body mass index is 42.37 kg/m².    Physical Exam     Physical Exam  Constitutional:      "  General: She is not in acute distress.     Appearance: Normal appearance. She is well-developed.   HENT:      Head: Normocephalic and atraumatic.      Right Ear: External ear normal.      Left Ear: External ear normal.   Eyes:      Extraocular Movements: Extraocular movements intact.      Conjunctiva/sclera: Conjunctivae normal.   Cardiovascular:      Rate and Rhythm: Normal rate and regular rhythm.      Heart sounds: No murmur heard.  Pulmonary:      Effort: Pulmonary effort is normal. No respiratory distress.      Breath sounds: Normal breath sounds. No wheezing.   Abdominal:      General: Bowel sounds are normal. There is no distension.      Palpations: Abdomen is soft.      Tenderness: There is no abdominal tenderness.   Musculoskeletal:      Right lower leg: No edema.      Left lower leg: No edema.   Skin:     General: Skin is warm and dry.   Neurological:      Mental Status: She is alert and oriented to person, place, and time.      Cranial Nerves: No cranial nerve deficit.   Psychiatric:         Mood and Affect: Mood normal.         Behavior: Behavior normal.         Assessment/Plan    Diagnoses and all orders for this visit:    1. Candidiasis of vulva and vagina (Primary)  Assessment & Plan:  Improved with one dose of Diflucan as prescribed by the urgent care.       2. Polyuria  -     Hemoglobin A1c    3. Depression with anxiety  Assessment & Plan:  Improved with current medications. She will continue Celexa.      4. Vitamin D deficiency  -     Vitamin D,25-Hydroxy    5. Fatigue, unspecified type  -     CBC & Differential  -     Comprehensive Metabolic Panel  -     TSH  -     Vitamin D,25-Hydroxy  -     T4, Free      Urgent care records reviewed today.     No orders of the defined types were placed in this encounter.      No orders of the defined types were placed in this encounter.      Return in about 4 months (around 6/2/2023) for anxiety and depression.    Edie Grant DO     Transcribed from  ambient dictation for Edie Grant DO by Kat Marcus.  02/02/23   16:31 EST    Patient or patient representative verbalized consent to the visit recording.  I have personally performed the services described in this document as transcribed by the above individual, and it is both accurate and complete.  Edie Grant DO  2/3/2023  21:32 EST

## 2023-02-03 LAB
25(OH)D3+25(OH)D2 SERPL-MCNC: 73.3 NG/ML (ref 30–100)
ALBUMIN SERPL-MCNC: 4.7 G/DL (ref 3.9–5)
ALBUMIN/GLOB SERPL: 2.1 {RATIO} (ref 1.2–2.2)
ALP SERPL-CCNC: 95 IU/L (ref 44–121)
ALT SERPL-CCNC: 20 IU/L (ref 0–32)
AST SERPL-CCNC: 24 IU/L (ref 0–40)
BASOPHILS # BLD AUTO: 0.1 X10E3/UL (ref 0–0.2)
BASOPHILS NFR BLD AUTO: 1 %
BILIRUB SERPL-MCNC: 0.5 MG/DL (ref 0–1.2)
BUN SERPL-MCNC: 9 MG/DL (ref 6–20)
BUN/CREAT SERPL: 13 (ref 9–23)
CALCIUM SERPL-MCNC: 9.5 MG/DL (ref 8.7–10.2)
CHLORIDE SERPL-SCNC: 102 MMOL/L (ref 96–106)
CO2 SERPL-SCNC: 23 MMOL/L (ref 20–29)
CREAT SERPL-MCNC: 0.7 MG/DL (ref 0.57–1)
EGFRCR SERPLBLD CKD-EPI 2021: 126 ML/MIN/1.73
EOSINOPHIL # BLD AUTO: 0.3 X10E3/UL (ref 0–0.4)
EOSINOPHIL NFR BLD AUTO: 3 %
ERYTHROCYTE [DISTWIDTH] IN BLOOD BY AUTOMATED COUNT: 12.5 % (ref 11.7–15.4)
GLOBULIN SER CALC-MCNC: 2.2 G/DL (ref 1.5–4.5)
GLUCOSE SERPL-MCNC: 70 MG/DL (ref 70–99)
HBA1C MFR BLD: 5.2 % (ref 4.8–5.6)
HCT VFR BLD AUTO: 41.1 % (ref 34–46.6)
HGB BLD-MCNC: 13.3 G/DL (ref 11.1–15.9)
IMM GRANULOCYTES # BLD AUTO: 0.1 X10E3/UL (ref 0–0.1)
IMM GRANULOCYTES NFR BLD AUTO: 1 %
LYMPHOCYTES # BLD AUTO: 3.1 X10E3/UL (ref 0.7–3.1)
LYMPHOCYTES NFR BLD AUTO: 29 %
MCH RBC QN AUTO: 29.4 PG (ref 26.6–33)
MCHC RBC AUTO-ENTMCNC: 32.4 G/DL (ref 31.5–35.7)
MCV RBC AUTO: 91 FL (ref 79–97)
MONOCYTES # BLD AUTO: 0.8 X10E3/UL (ref 0.1–0.9)
MONOCYTES NFR BLD AUTO: 7 %
NEUTROPHILS # BLD AUTO: 6.6 X10E3/UL (ref 1.4–7)
NEUTROPHILS NFR BLD AUTO: 59 %
PLATELET # BLD AUTO: 412 X10E3/UL (ref 150–450)
POTASSIUM SERPL-SCNC: 4.3 MMOL/L (ref 3.5–5.2)
PROT SERPL-MCNC: 6.9 G/DL (ref 6–8.5)
RBC # BLD AUTO: 4.53 X10E6/UL (ref 3.77–5.28)
SODIUM SERPL-SCNC: 139 MMOL/L (ref 134–144)
T4 FREE SERPL-MCNC: 0.81 NG/DL (ref 0.82–1.77)
TSH SERPL DL<=0.005 MIU/L-ACNC: 2.36 UIU/ML (ref 0.45–4.5)
WBC # BLD AUTO: 10.9 X10E3/UL (ref 3.4–10.8)

## 2023-02-10 RX ORDER — LEVOTHYROXINE SODIUM 0.05 MG/1
50 TABLET ORAL DAILY
Qty: 90 TABLET | Refills: 1 | Status: SHIPPED | OUTPATIENT
Start: 2023-02-10

## 2023-03-16 ENCOUNTER — HOSPITAL ENCOUNTER (EMERGENCY)
Facility: HOSPITAL | Age: 23
Discharge: HOME | End: 2023-03-17
Attending: STUDENT IN AN ORGANIZED HEALTH CARE EDUCATION/TRAINING PROGRAM | Admitting: STUDENT IN AN ORGANIZED HEALTH CARE EDUCATION/TRAINING PROGRAM
Payer: COMMERCIAL

## 2023-03-16 ENCOUNTER — APPOINTMENT (OUTPATIENT)
Dept: CT IMAGING | Facility: HOSPITAL | Age: 23
End: 2023-03-16
Payer: COMMERCIAL

## 2023-03-16 DIAGNOSIS — R07.0 THROAT PAIN: Primary | ICD-10-CM

## 2023-03-16 LAB
ALBUMIN SERPL-MCNC: 4.6 G/DL (ref 3.5–5.2)
ALBUMIN/GLOB SERPL: 1.8 G/DL
ALP SERPL-CCNC: 128 U/L (ref 39–117)
ALT SERPL W P-5'-P-CCNC: 83 U/L (ref 1–33)
ANION GAP SERPL CALCULATED.3IONS-SCNC: 13.3 MMOL/L (ref 5–15)
AST SERPL-CCNC: 40 U/L (ref 1–32)
B-HCG UR QL: NEGATIVE
BASOPHILS # BLD AUTO: 0.06 10*3/MM3 (ref 0–0.2)
BASOPHILS NFR BLD AUTO: 0.5 % (ref 0–1.5)
BILIRUB SERPL-MCNC: 0.9 MG/DL (ref 0–1.2)
BUN SERPL-MCNC: 10 MG/DL (ref 6–20)
BUN/CREAT SERPL: 14.1 (ref 7–25)
CALCIUM SPEC-SCNC: 9.7 MG/DL (ref 8.6–10.5)
CHLORIDE SERPL-SCNC: 97 MMOL/L (ref 98–107)
CO2 SERPL-SCNC: 26.7 MMOL/L (ref 22–29)
CREAT SERPL-MCNC: 0.71 MG/DL (ref 0.57–1)
DEPRECATED RDW RBC AUTO: 41.1 FL (ref 37–54)
EGFRCR SERPLBLD CKD-EPI 2021: 124.2 ML/MIN/1.73
EOSINOPHIL # BLD AUTO: 0.09 10*3/MM3 (ref 0–0.4)
EOSINOPHIL NFR BLD AUTO: 0.8 % (ref 0.3–6.2)
ERYTHROCYTE [DISTWIDTH] IN BLOOD BY AUTOMATED COUNT: 12.8 % (ref 12.3–15.4)
GLOBULIN UR ELPH-MCNC: 2.6 GM/DL
GLUCOSE SERPL-MCNC: 94 MG/DL (ref 65–99)
HCT VFR BLD AUTO: 40.5 % (ref 34–46.6)
HGB BLD-MCNC: 13.5 G/DL (ref 12–15.9)
IMM GRANULOCYTES # BLD AUTO: 0.04 10*3/MM3 (ref 0–0.05)
IMM GRANULOCYTES NFR BLD AUTO: 0.3 % (ref 0–0.5)
LYMPHOCYTES # BLD AUTO: 3.15 10*3/MM3 (ref 0.7–3.1)
LYMPHOCYTES NFR BLD AUTO: 26.3 % (ref 19.6–45.3)
MCH RBC QN AUTO: 29.2 PG (ref 26.6–33)
MCHC RBC AUTO-ENTMCNC: 33.3 G/DL (ref 31.5–35.7)
MCV RBC AUTO: 87.7 FL (ref 79–97)
MONOCYTES # BLD AUTO: 0.8 10*3/MM3 (ref 0.1–0.9)
MONOCYTES NFR BLD AUTO: 6.7 % (ref 5–12)
NEUTROPHILS NFR BLD AUTO: 65.4 % (ref 42.7–76)
NEUTROPHILS NFR BLD AUTO: 7.83 10*3/MM3 (ref 1.7–7)
NRBC BLD AUTO-RTO: 0 /100 WBC (ref 0–0.2)
PLATELET # BLD AUTO: 402 10*3/MM3 (ref 140–450)
PMV BLD AUTO: 9.1 FL (ref 6–12)
POTASSIUM SERPL-SCNC: 3.7 MMOL/L (ref 3.5–5.2)
PROT SERPL-MCNC: 7.2 G/DL (ref 6–8.5)
RBC # BLD AUTO: 4.62 10*6/MM3 (ref 3.77–5.28)
S PYO AG THROAT QL: NEGATIVE
SODIUM SERPL-SCNC: 137 MMOL/L (ref 136–145)
WBC NRBC COR # BLD: 11.97 10*3/MM3 (ref 3.4–10.8)

## 2023-03-16 PROCEDURE — 81025 URINE PREGNANCY TEST: CPT | Performed by: STUDENT IN AN ORGANIZED HEALTH CARE EDUCATION/TRAINING PROGRAM

## 2023-03-16 PROCEDURE — C9803 HOPD COVID-19 SPEC COLLECT: HCPCS

## 2023-03-16 PROCEDURE — 99283 EMERGENCY DEPT VISIT LOW MDM: CPT

## 2023-03-16 PROCEDURE — 85025 COMPLETE CBC W/AUTO DIFF WBC: CPT | Performed by: STUDENT IN AN ORGANIZED HEALTH CARE EDUCATION/TRAINING PROGRAM

## 2023-03-16 PROCEDURE — 80053 COMPREHEN METABOLIC PANEL: CPT | Performed by: STUDENT IN AN ORGANIZED HEALTH CARE EDUCATION/TRAINING PROGRAM

## 2023-03-16 PROCEDURE — 87880 STREP A ASSAY W/OPTIC: CPT

## 2023-03-16 PROCEDURE — 25510000001 IOPAMIDOL 61 % SOLUTION: Performed by: STUDENT IN AN ORGANIZED HEALTH CARE EDUCATION/TRAINING PROGRAM

## 2023-03-16 PROCEDURE — 87081 CULTURE SCREEN ONLY: CPT

## 2023-03-16 PROCEDURE — 70491 CT SOFT TISSUE NECK W/DYE: CPT

## 2023-03-16 RX ADMIN — IOPAMIDOL 100 ML: 612 INJECTION, SOLUTION INTRAVENOUS at 23:17

## 2023-03-17 VITALS
DIASTOLIC BLOOD PRESSURE: 75 MMHG | BODY MASS INDEX: 31.47 KG/M2 | SYSTOLIC BLOOD PRESSURE: 114 MMHG | HEIGHT: 62 IN | OXYGEN SATURATION: 94 % | TEMPERATURE: 98.6 F | WEIGHT: 171 LBS | HEART RATE: 103 BPM | RESPIRATION RATE: 18 BRPM

## 2023-03-17 NOTE — DISCHARGE INSTRUCTIONS
You were evaluated for sore throat.  We got labs, swab your throat for strep, and got a CT scan.  This was all negative for any acute process or any concern for masses.  You are now stable for discharge.  You may have a virus which is exacerbating her throat symptoms.  Would recommend following up your primary care doctor to ensure that you improve appropriately.  You are now stable for discharge.

## 2023-03-17 NOTE — ED PROVIDER NOTES
Subjective  History of Present Illness:    Patient is a 22-year-old female with history of hypothyroidism, anxiety, asthma who presents today with difficulty swallowing.  States she was recently diagnosed with hypothyroidism and started on levothyroxine.  Denies having had any ultrasound or any other evaluation of her thyroid.  Denies having been told the etiology of her hypothyroidism.  States that today she began to have trouble swallowing which worsened throughout the day.  Was able to swallow and denies any globus sensation at this time.  No hilary goiter on my exam.  She also endorses shortness of breath during these episodes.  No respiratory distress on my exam.  Otherwise denies chest pain.  No abdominal pain, nausea, vomiting, diarrhea.  Denies any abnormal vaginal bleeding or discharge.  No urinary symptoms.  Denies any unilateral leg swelling or leg pain.  No stridor on exam.      Nurses Notes reviewed and agree, including vitals, allergies, social history and prior medical history.     REVIEW OF SYSTEMS: All systems reviewed and not pertinent unless noted.  Review of Systems   Constitutional: Positive for activity change. Negative for appetite change, chills, fatigue and fever.   HENT: Positive for sore throat and trouble swallowing. Negative for rhinorrhea, sinus pressure, sinus pain and voice change.    Eyes: Negative for discharge and itching.   Respiratory: Positive for shortness of breath. Negative for cough.    Cardiovascular: Negative for chest pain and leg swelling.   Gastrointestinal: Negative for abdominal distention, abdominal pain, nausea and vomiting.   Endocrine: Negative for cold intolerance and heat intolerance.   Genitourinary: Negative for decreased urine volume, difficulty urinating, flank pain, frequency, urgency, vaginal bleeding, vaginal discharge and vaginal pain.   Musculoskeletal: Negative for gait problem, neck pain and neck stiffness.   Skin: Negative for color change.    Allergic/Immunologic: Negative for environmental allergies.   Neurological: Negative for seizures, syncope, facial asymmetry and speech difficulty.   Psychiatric/Behavioral: Negative for self-injury and suicidal ideas.       Past Medical History:   Diagnosis Date   • Anemia    • Anxiety    • Asthma     exercise induced   • Body piercing     both ears, left nare   • COVID-19 vaccine series completed     Pfizer x 2   • Depression    • Disease of thyroid gland    • History of stomach ulcers    • Seasonal allergies    • Tattoos    • UTI (urinary tract infection)    • Wears glasses        Allergies:    Sulfa antibiotics      Past Surgical History:   Procedure Laterality Date   • CHOLECYSTECTOMY N/A 8/26/2022    Procedure: CHOLECYSTECTOMY LAPAROSCOPIC;  Surgeon: Abbi Reynolds MD;  [facility];  Service: General;  Laterality: N/A;   • COLONOSCOPY N/A 11/1/2022    Procedure: COLONOSCOPY WITH BIOPSY;  Surgeon: Abbi Reynolds MD;  [facility];  Service: Gastroenterology;  Laterality: N/A;   • ENDOSCOPY     • WISDOM TOOTH EXTRACTION           Social History     Socioeconomic History   • Marital status:    • Number of children: 0   Tobacco Use   • Smoking status: Never     Passive exposure: Never   • Smokeless tobacco: Never   Vaping Use   • Vaping Use: Never used   Substance and Sexual Activity   • Alcohol use: Yes     Alcohol/week: 1.0 - 2.0 standard drink     Types: 1 - 2 Glasses of wine per week   • Drug use: Never   • Sexual activity: Defer         Family History   Problem Relation Age of Onset   • Thyroid disease Mother    • Depression Mother    • Narcolepsy Mother    • Anxiety disorder Mother    • Hypertension Father    • Depression Father    • Anxiety disorder Father    • Other Brother         suspected autism   • Colon cancer Maternal Aunt    • Cancer Maternal Aunt         colon   • Other Maternal Grandmother    • Heart disease Maternal Grandmother         probable   • Diabetes  "Maternal Grandfather    • Heart disease Paternal Grandfather    • Arthritis Other    • Thyroid disease Other    • Diabetes Other    • Hypertension Other    • Mental illness Other        Objective  Physical Exam:  /75   Pulse 103   Temp 98.6 °F (37 °C) (Oral)   Resp 18   Ht 157.5 cm (62\")   Wt 77.6 kg (171 lb)   LMP 03/06/2023 (Exact Date)   SpO2 94%   BMI 31.28 kg/m²      Physical Exam  Constitutional:       General: She is not in acute distress.     Appearance: Normal appearance. She is not ill-appearing.   HENT:      Head: Normocephalic and atraumatic.      Nose: Nose normal. No congestion or rhinorrhea.      Mouth/Throat:      Mouth: Mucous membranes are moist.      Tongue: Tongue does not deviate from midline.      Palate: No lesions.      Pharynx: Oropharynx is clear. No oropharyngeal exudate or posterior oropharyngeal erythema.      Tonsils: No tonsillar exudate or tonsillar abscesses.   Eyes:      Extraocular Movements: Extraocular movements intact.      Conjunctiva/sclera: Conjunctivae normal.      Pupils: Pupils are equal, round, and reactive to light.   Neck:      Thyroid: No thyroid mass or thyromegaly.      Trachea: Trachea and phonation normal. No tracheal tenderness.   Cardiovascular:      Rate and Rhythm: Normal rate and regular rhythm.      Pulses: Normal pulses.      Heart sounds: Normal heart sounds.   Pulmonary:      Effort: Pulmonary effort is normal. No respiratory distress.      Breath sounds: Normal breath sounds.   Abdominal:      General: Abdomen is flat. Bowel sounds are normal. There is no distension.      Palpations: Abdomen is soft.      Tenderness: There is no abdominal tenderness.   Musculoskeletal:         General: No swelling or tenderness. Normal range of motion.      Cervical back: Normal range of motion and neck supple. No edema or erythema. Normal range of motion.   Lymphadenopathy:      Cervical: No cervical adenopathy.      Right cervical: No superficial cervical " adenopathy.     Left cervical: No superficial cervical adenopathy.   Skin:     General: Skin is warm and dry.      Capillary Refill: Capillary refill takes less than 2 seconds.   Neurological:      General: No focal deficit present.      Mental Status: She is alert and oriented to person, place, and time. Mental status is at baseline.      Cranial Nerves: No cranial nerve deficit.      Sensory: No sensory deficit.      Motor: No weakness.      Coordination: Coordination normal.   Psychiatric:         Mood and Affect: Mood normal.         Behavior: Behavior normal.         Thought Content: Thought content normal.         Judgment: Judgment normal.         Procedures    ED Course:         Lab Results (last 24 hours)     Procedure Component Value Units Date/Time    Rapid Strep A Screen - Swab, Throat [[id number]]  (Normal) Collected: 03/16/23 1949    Specimen: Swab from Throat Updated: 03/16/23 2004     Strep A Ag Negative    Beta Strep Culture, Throat - Swab, Throat [[id number]] Collected: 03/16/23 1949    Specimen: Swab from Throat Updated: 03/16/23 2002    CBC & Differential [[id number]]  (Abnormal) Collected: 03/16/23 2248    Specimen: Blood Updated: 03/16/23 2259    Narrative:      The following orders were created for panel order CBC & Differential.  Procedure                               Abnormality         Status                     ---------                               -----------         ------                     CBC Auto Differential[[id number]]        Abnormal            Final result                 Please view results for these tests on the individual orders.    Comprehensive Metabolic Panel [[id number]]  (Abnormal) Collected: 03/16/23 2248    Specimen: Blood Updated: 03/16/23 2315     Glucose 94 mg/dL      BUN 10 mg/dL      Creatinine 0.71 mg/dL      Sodium 137 mmol/L      Potassium 3.7 mmol/L      Chloride 97 mmol/L      CO2 26.7 mmol/L      Calcium 9.7 mg/dL      Total Protein 7.2 g/dL      Albumin 4.6  g/dL      ALT (SGPT) 83 U/L      AST (SGOT) 40 U/L      Alkaline Phosphatase 128 U/L      Total Bilirubin 0.9 mg/dL      Globulin 2.6 gm/dL      A/G Ratio 1.8 g/dL      BUN/Creatinine Ratio 14.1     Anion Gap 13.3 mmol/L      eGFR 124.2 mL/min/1.73     Narrative:      GFR Normal >60  Chronic Kidney Disease <60  Kidney Failure <15      CBC Auto Differential [[id number]]  (Abnormal) Collected: 03/16/23 2248    Specimen: Blood Updated: 03/16/23 2259     WBC 11.97 10*3/mm3      RBC 4.62 10*6/mm3      Hemoglobin 13.5 g/dL      Hematocrit 40.5 %      MCV 87.7 fL      MCH 29.2 pg      MCHC 33.3 g/dL      RDW 12.8 %      RDW-SD 41.1 fl      MPV 9.1 fL      Platelets 402 10*3/mm3      Neutrophil % 65.4 %      Lymphocyte % 26.3 %      Monocyte % 6.7 %      Eosinophil % 0.8 %      Basophil % 0.5 %      Immature Grans % 0.3 %      Neutrophils, Absolute 7.83 10*3/mm3      Lymphocytes, Absolute 3.15 10*3/mm3      Monocytes, Absolute 0.80 10*3/mm3      Eosinophils, Absolute 0.09 10*3/mm3      Basophils, Absolute 0.06 10*3/mm3      Immature Grans, Absolute 0.04 10*3/mm3      nRBC 0.0 /100 WBC     Pregnancy, Urine - Urine, Clean Catch [[id number]]  (Normal) Collected: 03/16/23 2250    Specimen: Urine, Clean Catch Updated: 03/16/23 2301     HCG, Urine QL Negative           No radiology results from the last 24 hrs       MDM    Initial impression of presenting illness: Difficulty swallowing    DDX: includes but is not limited to: Thyroid mass, globus, anxiety    Patient arrives stable with vitals interpreted by myself.     Pertinent features from physical exam: No goiter, palpable thyroid mass on exam.  Patient tolerating her secretions and able to swallow.    Initial diagnostic plan: CBC, CMP, CT neck with contrast    Results from initial plan were reviewed and interpreted by me revealing no concern for mass of the throat, patient remained stable during ER course    Diagnostic information from other sources: Discussed with significant  other at bedside    Interventions / Re-evaluation: Patient observed in the emergency department for several hours with no recurrence of shortness of breath or symptoms    Results/clinical rationale were discussed with patient at bedside    Consultations/Discussion of results with other physicians: Discussed negative work-up with patient, encourage PCP follow-up to ensure adequate resolution.  Feel as though this may be viral pharyngitis.  Strep swab here was negative.  Patient voiced understanding and agreement the plan.  Also discussed no throat masses seen on CT, no thyroid nodules.    Disposition plan: Discharged  -----    Final diagnoses:   Throat pain        Adam Ramos MD  03/17/23 0444

## 2023-03-19 LAB — BACTERIA SPEC AEROBE CULT: NORMAL

## 2023-05-26 RX ORDER — LEVOTHYROXINE SODIUM 0.05 MG/1
50 TABLET ORAL DAILY
Qty: 90 TABLET | Refills: 1 | Status: SHIPPED | OUTPATIENT
Start: 2023-05-26

## 2023-06-26 PROBLEM — E66.9 CLASS 1 OBESITY WITHOUT SERIOUS COMORBIDITY WITH BODY MASS INDEX (BMI) OF 31.0 TO 31.9 IN ADULT: Status: ACTIVE | Noted: 2023-06-26

## 2023-06-26 PROBLEM — R39.9 UTI SYMPTOMS: Status: RESOLVED | Noted: 2023-01-06 | Resolved: 2023-06-26

## 2023-06-26 PROBLEM — E03.9 ACQUIRED HYPOTHYROIDISM: Status: ACTIVE | Noted: 2023-06-26

## 2023-06-26 PROBLEM — E66.811 CLASS 1 OBESITY WITHOUT SERIOUS COMORBIDITY WITH BODY MASS INDEX (BMI) OF 31.0 TO 31.9 IN ADULT: Status: ACTIVE | Noted: 2023-06-26

## 2023-06-26 PROBLEM — R10.819 SUPRAPUBIC TENDERNESS: Status: RESOLVED | Noted: 2022-06-03 | Resolved: 2023-06-26

## 2023-07-25 ENCOUNTER — TELEPHONE (OUTPATIENT)
Dept: OBSTETRICS AND GYNECOLOGY | Facility: CLINIC | Age: 23
End: 2023-07-25

## 2023-07-25 NOTE — TELEPHONE ENCOUNTER
Caller: Rachel Pereira    Relationship: Self    Best call back number: [phone]    Who is your current provider: DR JAMISON SIMON     Who would you like your new provider to be: DR FANG    What are your reasons for transferring care: THE [facility] LOCATION IS CLOSER TO THE PATIENT     Additional notes:

## 2023-07-25 NOTE — TELEPHONE ENCOUNTER
Caller: Rachel Pereira    Relationship: Self    Best call back number: [phone]    Who is your current provider: DR SIMON    Who would you like your new provider to be: DR FANG    What are your reasons for transferring care: IS IN [place] AND THE [place] OFFICE IS CLOSER TO HER     Additional notes:

## 2023-09-15 ENCOUNTER — OFFICE VISIT (OUTPATIENT)
Dept: OBSTETRICS AND GYNECOLOGY | Facility: CLINIC | Age: 23
End: 2023-09-15
Payer: COMMERCIAL

## 2023-09-15 VITALS
BODY MASS INDEX: 30.82 KG/M2 | DIASTOLIC BLOOD PRESSURE: 58 MMHG | WEIGHT: 167.5 LBS | HEIGHT: 62 IN | SYSTOLIC BLOOD PRESSURE: 102 MMHG

## 2023-09-15 DIAGNOSIS — Z01.419 WELL WOMAN EXAM: Primary | ICD-10-CM

## 2023-09-15 DIAGNOSIS — Z31.69 ENCOUNTER FOR PRECONCEPTION CONSULTATION: ICD-10-CM

## 2023-09-15 DIAGNOSIS — R10.2 PELVIC PAIN: ICD-10-CM

## 2023-09-15 DIAGNOSIS — N94.10 FEMALE DYSPAREUNIA: ICD-10-CM

## 2023-09-15 LAB
B-HCG UR QL: NEGATIVE
EXPIRATION DATE: NORMAL
INTERNAL NEGATIVE CONTROL: NEGATIVE
INTERNAL POSITIVE CONTROL: POSITIVE
Lab: NORMAL

## 2023-09-15 RX ORDER — LEVONORGESTREL AND ETHINYL ESTRADIOL AND ETHINYL ESTRADIOL 150-30(84)
KIT ORAL
COMMUNITY
Start: 2023-07-12 | End: 2023-09-15

## 2023-09-15 NOTE — PROGRESS NOTES
Annual Well Woman Visit    Subjective   Chief Complaint   Patient presents with    Annual Exam     (R) ovarian pain   Stopped BC 4-5 months ago  Trying to conceive     Rachel Pereira is a 23 y.o.  presenting to be seen for an annual well woman visit. She has concerns today for right sided pelvic pain as well as trying to conceive. She has been off her birth control for 4-5 months. She and her partner are not actively trying to conceive but also not trying to prevent pregnancy. Her periods have been regular since stopping OCPs - she has been 2-5 days late at most. She does report pain with intercourse - sometimes worse than others. It can be on the right side versus can be throughout the pelvis. It is worsened by deep penetration and she sometimes has cramping after intercourse. Some positions are better than others. She denies urinary complaints including incontinence.    The pain on her right side has been ongoing for 2-3 months. It waxes and wanes throughout the month and seems to worsen at the time of ovulation. She has had a couple instances where the pain was so severe, she had to call in to work to stay home.     OB Hx: G0  Contraception: none  Pap smear: never  Mammogram: N/A  Colonoscopy: 2022  DEXA Scan: N/A    Past Medical History:   Diagnosis Date    Anemia     Anxiety     Asthma     exercise induced    Body piercing     both ears, left nare    COVID-19 vaccine series completed     Pfizer x 2    Depression     Disease of thyroid gland     History of stomach ulcers     Seasonal allergies     Tattoos     UTI (urinary tract infection)     Wears glasses      Past Surgical History:   Procedure Laterality Date    CHOLECYSTECTOMY N/A 2022    Procedure: CHOLECYSTECTOMY LAPAROSCOPIC;  Surgeon: Abbi Reynolds MD;  Location: [facility] OR;  Service: General;  Laterality: N/A;    COLONOSCOPY N/A 2022    Procedure: COLONOSCOPY WITH BIOPSY;  Surgeon: Abbi Reynolds MD;  Location: [facility]  ENDOSCOPY;  Service: Gastroenterology;  Laterality: N/A;    ENDOSCOPY      LAPAROSCOPIC CHOLECYSTECTOMY      WISDOM TOOTH EXTRACTION       Family History   Problem Relation Age of Onset    Hypertension Father     Depression Father     Anxiety disorder Father     Thyroid disease Mother     Depression Mother     Narcolepsy Mother     Anxiety disorder Mother     Other Brother         suspected autism    Heart disease Paternal Grandfather     Mental illness Maternal Grandmother     Heart disease Maternal Grandmother         probable    Diabetes Maternal Grandfather     Colon cancer Maternal Aunt     Arthritis Other     Thyroid disease Other     Diabetes Other     Hypertension Other     Mental illness Other      Social History     Tobacco Use    Smoking status: Passive Smoke Exposure - Never Smoker     Passive exposure: Never    Smokeless tobacco: Never    Tobacco comments:     Only smoke every once in a while when drinking   Vaping Use    Vaping Use: Never used   Substance Use Topics    Alcohol use: Yes     Alcohol/week: 1.0 - 2.0 standard drink     Types: 1 - 2 Glasses of wine per week    Drug use: Never     (Not in a hospital admission)    Sulfa antibiotics  Current Outpatient Medications on File Prior to Visit   Medication Sig Dispense Refill    citalopram (CeleXA) 40 MG tablet Take 1 tablet by mouth Daily. 90 tablet 3    levothyroxine (Synthroid) 75 MCG tablet Take 1 tablet by mouth Daily. 90 tablet 3    metFORMIN (Glucophage) 500 MG tablet Take 1 tablet by mouth 2 (Two) Times a Day With Meals. 60 tablet 2    ondansetron ODT (ZOFRAN-ODT) 4 MG disintegrating tablet Place 1 tablet under the tongue Every 8 (Eight) Hours As Needed for Nausea or Vomiting. 20 tablet 0    vitamin D3 125 MCG (5000 UT) capsule capsule Take 1 capsule by mouth Daily.      [DISCONTINUED] Simpesse 0.15-0.03 &0.01 MG        No current facility-administered medications on file prior to visit.     Social History    Tobacco Use      Smoking status:  "Passive Smoke Exposure - Never Smoker      Smokeless tobacco: Never      Tobacco comments: Only smoke every once in a while when drinking    Review of Systems  Pertinent items are noted in HPI, all other systems were reviewed and negative     Objective   /58   Ht 157.5 cm (62\")   Wt 76 kg (167 lb 8 oz)   BMI 30.64 kg/m²     Physical Exam:  General Appearance: alert, interactive, and cooperative  Breasts:  Examined in supine position  Symmetric without masses or skin dimpling  Nipples normal without inversion, lesions or discharge  There are no palpable axillary nodes  Abdomen: no masses, soft, non-tender, no guarding and no rebound tenderness  Pelvis:  Pelvic: Clinical staff was present for exam  External genitalia:  normal appearance of the external genitalia including Bartholin's and Ramblewood's glands.  :  urethral meatus normal;  Vagina:  normal pink mucosa without prolapse or lesions.  Cervix:  normal appearance  Uterus:  normal size, shape and consistency.  Adnexa:  normal bimanual exam of the adnexa, discomfort on right side     Assessment & Plan    Annual well woman exam with age appropriate screening      Diagnosis Plan   1. Well woman exam  LIQUID-BASED PAP SMEAR WITH HPV GENOTYPING IF ASCUS ([facility])    POC Pregnancy, Urine      2. Pelvic pain  US Non-ob Transvaginal      3. Female dyspareunia  US Non-ob Transvaginal      4. Encounter for preconception consultation          Medications ordered: None    Procedures performed: Pap    - Mammogram: Not indicated  - Pap screening guidelines reviewed; pap smear completed today  - Yearly clinical breast and pelvic exams recommended regardless of pap recommendations  - Healthy diet and exercise encouraged  - Calcium and Vitamin D requirements reviewed  - Contraception: none, trying to conceive  - Screening: GC/CZ on Pap  - Pelvic pain/ dyspareunia: TVUS ordered to assess for anatomic causes of pain  - Preconception counseling: discussed that it can be " normal to take up to 12 months of regular intercourse without contraception to conceive, however if her periods become irregular or she tries OPKs and is not having (+) tests, could consider evaluation for PCOS.     Follow up for annual visit or sooner with any concerns.    Margarita Figueroa MD  Obstetrics and Gynecology  [facility]

## 2023-09-20 LAB — REF LAB TEST METHOD: NORMAL

## 2023-09-27 ENCOUNTER — OFFICE VISIT (OUTPATIENT)
Dept: OBSTETRICS AND GYNECOLOGY | Facility: CLINIC | Age: 23
End: 2023-09-27
Payer: COMMERCIAL

## 2023-09-27 VITALS
DIASTOLIC BLOOD PRESSURE: 62 MMHG | SYSTOLIC BLOOD PRESSURE: 108 MMHG | HEIGHT: 62 IN | BODY MASS INDEX: 31.39 KG/M2 | WEIGHT: 170.6 LBS

## 2023-09-27 DIAGNOSIS — N92.6 LATE MENSES: ICD-10-CM

## 2023-09-27 DIAGNOSIS — R10.2 PELVIC PAIN: Primary | ICD-10-CM

## 2023-09-27 NOTE — PROGRESS NOTES
GYN Office Visit    Subjective   Chief Complaint   Patient presents with    Follow-up     TVS     Rachel Pereira is a 23 y.o.  presenting for follow up evaluation of pelvic pain and dyspareunia. She was initially seen 9/15/23 for an annual exam. At that time, she reported stopping her OCPs 4-5 months prior. She and her partner have not been actively trying to conceive, but they are also not trying to prevent pregnancy. She reported a 2-3 month history of right sided pelvic pain that waxes and wanes throughout the month and worsens at the time of ovulation. She presents today for TVUS to assess for any anatomic causes of her pain.    Rachel reports she had some discomfort during her ultrasound but nothing severe. The only change since her last visit is that she is late for her period - her LMP was . She has not yet taken a pregnancy test.     OB Hx: G0  Pap smear: 9/15/23, NILM --> next due 2026  Mammogram: N/A  Colonoscopy: 2022  DEXA Scan: N/A    Past Medical History:   Diagnosis Date    Anemia     Anxiety     Asthma     exercise induced    Body piercing     both ears, left nare    COVID-19 vaccine series completed     Pfizer x 2    Depression     Disease of thyroid gland     History of stomach ulcers     Seasonal allergies     Tattoos     UTI (urinary tract infection)     Wears glasses      Past Surgical History:   Procedure Laterality Date    CHOLECYSTECTOMY N/A 2022    Procedure: CHOLECYSTECTOMY LAPAROSCOPIC;  Surgeon: Abbi Reynolds MD;  Location: [facility] OR;  Service: General;  Laterality: N/A;    COLONOSCOPY N/A 2022    Procedure: COLONOSCOPY WITH BIOPSY;  Surgeon: Abbi Reynolds MD;  Location: [facility] ENDOSCOPY;  Service: Gastroenterology;  Laterality: N/A;    ENDOSCOPY      LAPAROSCOPIC CHOLECYSTECTOMY      WISDOM TOOTH EXTRACTION       Family History   Problem Relation Age of Onset    Hypertension Father     Depression Father     Anxiety disorder Father     Thyroid  "disease Mother     Depression Mother     Narcolepsy Mother     Anxiety disorder Mother     Other Brother         suspected autism    Heart disease Paternal Grandfather     Mental illness Maternal Grandmother     Heart disease Maternal Grandmother         probable    Diabetes Maternal Grandfather     Colon cancer Maternal Aunt     Arthritis Other     Thyroid disease Other     Diabetes Other     Hypertension Other     Mental illness Other       Social History     Tobacco Use    Smoking status: Passive Smoke Exposure - Never Smoker     Passive exposure: Never    Smokeless tobacco: Never    Tobacco comments:     Only smoke every once in a while when drinking   Vaping Use    Vaping Use: Never used   Substance Use Topics    Alcohol use: Yes     Alcohol/week: 1.0 - 2.0 standard drink     Types: 1 - 2 Glasses of wine per week    Drug use: Never     Allergies   Allergen Reactions    Sulfa Antibiotics Rash     Current Outpatient Medications on File Prior to Visit   Medication Sig Dispense Refill    citalopram (CeleXA) 40 MG tablet Take 1 tablet by mouth Daily. 90 tablet 3    levothyroxine (Synthroid) 75 MCG tablet Take 1 tablet by mouth Daily. 90 tablet 3    metFORMIN (Glucophage) 500 MG tablet Take 1 tablet by mouth 2 (Two) Times a Day With Meals. 60 tablet 2    ondansetron ODT (ZOFRAN-ODT) 4 MG disintegrating tablet Place 1 tablet under the tongue Every 8 (Eight) Hours As Needed for Nausea or Vomiting. 20 tablet 0    vitamin D3 125 MCG (5000 UT) capsule capsule Take 1 capsule by mouth Daily.       No current facility-administered medications on file prior to visit.     Social History    Tobacco Use      Smoking status: Passive Smoke Exposure - Never Smoker      Smokeless tobacco: Never      Tobacco comments: Only smoke every once in a while when drinking       Objective   /62   Ht 157.5 cm (62\")   Wt 77.4 kg (170 lb 9.6 oz)   BMI 31.20 kg/m²     Physical Exam:  General Appearance: alert, interactive, and NAD   "   Medical Decision Making:    I reviewed Pap results from 9/15/23 - NILM.    Independent interpretation of tests:  TVUS performed today -   Anteverted uterus measuring 7.3 x 4.4 x 3.5 cm without any masses seen. The endometrium measures up to 10.6 mm. The bilateral ovaries are normal in appearance with normal vascular flow and small follicles. No adnexal masses seen and no free fluid in the cul de sac.     Assessment & Plan      Diagnosis Plan   1. Pelvic pain        2. Late menses  POC Pregnancy, Urine        Medication Management: Discussed the option for hormonal contraception for management of pelvic pain, dysmenorrhea.     Procedures Performed: None    We reviewed her ultrasound images, which are overall normal/ reassuring. Rachel reports her menses are late - UPT in the office today is negative. We discussed that her endometrium is thickened and could potentially indicate very early pregnancy. She is to take a repeat UPT if she does not start her menses in the next 1-2 weeks. Regarding her pelvic pain - we discussed that in the absence of anatomic findings on ultrasound, typically the next recommended management option is hormonal contraception in case of endometriosis or other hormonally driven causes of pain. She is currently trying to conceive, so she would like to defer for now, however if she and her partner decide to take a break from trying, she will reach out to discuss options.     RTC for annual exam or PRN.    Margarita Figueroa MD  Obstetrics and Gynecology  [facility]

## 2023-10-24 ENCOUNTER — OFFICE VISIT (OUTPATIENT)
Dept: INTERNAL MEDICINE | Facility: CLINIC | Age: 23
End: 2023-10-24
Payer: COMMERCIAL

## 2023-10-24 VITALS
DIASTOLIC BLOOD PRESSURE: 74 MMHG | OXYGEN SATURATION: 97 % | BODY MASS INDEX: 31.65 KG/M2 | HEART RATE: 86 BPM | TEMPERATURE: 97.1 F | SYSTOLIC BLOOD PRESSURE: 118 MMHG | HEIGHT: 62 IN | WEIGHT: 172 LBS

## 2023-10-24 DIAGNOSIS — Z20.822 EXPOSURE TO COVID-19 VIRUS: ICD-10-CM

## 2023-10-24 DIAGNOSIS — E03.9 ACQUIRED HYPOTHYROIDISM: ICD-10-CM

## 2023-10-24 DIAGNOSIS — F41.8 DEPRESSION WITH ANXIETY: ICD-10-CM

## 2023-10-24 DIAGNOSIS — J10.1 INFLUENZA A: Primary | ICD-10-CM

## 2023-10-24 LAB
EXPIRATION DATE: ABNORMAL
FLUAV AG UPPER RESP QL IA.RAPID: DETECTED
FLUBV AG UPPER RESP QL IA.RAPID: NOT DETECTED
INTERNAL CONTROL: ABNORMAL
Lab: ABNORMAL
SARS-COV-2 AG UPPER RESP QL IA.RAPID: NOT DETECTED

## 2023-10-24 PROCEDURE — 87428 SARSCOV & INF VIR A&B AG IA: CPT | Performed by: FAMILY MEDICINE

## 2023-10-24 PROCEDURE — 99214 OFFICE O/P EST MOD 30 MIN: CPT | Performed by: FAMILY MEDICINE

## 2023-10-24 RX ORDER — BUSPIRONE HYDROCHLORIDE 5 MG/1
5 TABLET ORAL 2 TIMES DAILY PRN
Qty: 60 TABLET | Refills: 5 | Status: SHIPPED | OUTPATIENT
Start: 2023-10-24

## 2023-10-24 RX ORDER — OSELTAMIVIR PHOSPHATE 75 MG/1
75 CAPSULE ORAL 2 TIMES DAILY
Qty: 10 CAPSULE | Refills: 0 | Status: SHIPPED | OUTPATIENT
Start: 2023-10-24

## 2023-10-24 NOTE — PROGRESS NOTES
Rachel Pereira is a 23 y.o. female.    Chief Complaint   Patient presents with    Exposure To Known Illness     COVID exposure is wanting tested.     Anxiety       HPI   Patient reports they have not been feeling well for 2day(s). She admits to rhinorrhea, nasal congestion, sore throat, cough, headache.  She denies ear pain.  They have tried pamprin for this issue without good response.    She reports increase anxiety.  She is taking celexa.  She did schedule an appt with a therapist next week.      Patient has had hypothyroidism for a long time.  Patient is compliant with taking medication without side effects.  Patient admits to fatigue, heat intolerance.  Patient denies feeling cold and cold intolerance, change in skin,  nails, or hair, palpitations.        The following portions of the patient's history were reviewed and updated as appropriate: allergies, current medications, past family history, past medical history, past social history, past surgical history and problem list.     Allergies   Allergen Reactions    Sulfa Antibiotics Rash         Current Outpatient Medications:     citalopram (CeleXA) 40 MG tablet, Take 1 tablet by mouth Daily., Disp: 90 tablet, Rfl: 3    levothyroxine (Synthroid) 75 MCG tablet, Take 1 tablet by mouth Daily., Disp: 90 tablet, Rfl: 3    metFORMIN (Glucophage) 500 MG tablet, Take 1 tablet by mouth 2 (Two) Times a Day With Meals., Disp: 60 tablet, Rfl: 2    ondansetron ODT (ZOFRAN-ODT) 4 MG disintegrating tablet, Place 1 tablet under the tongue Every 8 (Eight) Hours As Needed for Nausea or Vomiting., Disp: 20 tablet, Rfl: 0    vitamin D3 125 MCG (5000 UT) capsule capsule, Take 1 capsule by mouth Daily., Disp: , Rfl:     busPIRone (BUSPAR) 5 MG tablet, Take 1 tablet by mouth 2 (Two) Times a Day As Needed (anxiety)., Disp: 60 tablet, Rfl: 5    oseltamivir (Tamiflu) 75 MG capsule, Take 1 capsule by mouth 2 (Two) Times a Day., Disp: 10 capsule, Rfl: 0    ROS    Review of Systems  "  Constitutional:  Positive for fatigue. Negative for chills and fever.   HENT:  Positive for congestion, postnasal drip, rhinorrhea and sore throat.    Respiratory:  Negative for shortness of breath.    Cardiovascular:  Negative for chest pain.   Gastrointestinal:  Positive for GERD.   Neurological:  Positive for headache.   Psychiatric/Behavioral:  The patient is nervous/anxious.        Vitals:    10/24/23 1523   BP: 118/74   BP Location: Right arm   Patient Position: Sitting   Cuff Size: Adult   Pulse: 86   Temp: 97.1 °F (36.2 °C)   TempSrc: Temporal   SpO2: 97%   Weight: 78 kg (172 lb)   Height: 157.5 cm (62\")   PainSc: 3  Comment: 3 normally 8 when its acting up   PainLoc: Abdomen  Comment: around the gallbladder area     Body mass index is 31.46 kg/m².    Physical Exam     Physical Exam  Constitutional:       General: She is not in acute distress.     Appearance: Normal appearance. She is well-developed.   HENT:      Head: Normocephalic and atraumatic.      Right Ear: Tympanic membrane and external ear normal.      Left Ear: Tympanic membrane and external ear normal.      Nose: Congestion present.      Mouth/Throat:      Pharynx: Posterior oropharyngeal erythema present.   Eyes:      Extraocular Movements: Extraocular movements intact.      Conjunctiva/sclera: Conjunctivae normal.   Cardiovascular:      Rate and Rhythm: Normal rate and regular rhythm.      Heart sounds: No murmur heard.  Pulmonary:      Effort: Pulmonary effort is normal. No respiratory distress.      Breath sounds: Normal breath sounds. No wheezing.   Abdominal:      General: Bowel sounds are normal. There is no distension.      Palpations: Abdomen is soft.      Tenderness: There is no abdominal tenderness.   Skin:     General: Skin is warm and dry.   Neurological:      Mental Status: She is alert and oriented to person, place, and time.      Cranial Nerves: No cranial nerve deficit.   Psychiatric:         Mood and Affect: Mood normal.        "  Behavior: Behavior normal.         Assessment/Plan    Diagnoses and all orders for this visit:    1. Influenza A (Primary)  Assessment & Plan:  Known COVID exposure, but positive for flu with mild symptoms.  Will treat with tamiflu. May take OTC medications for symptomatic relief.        2. Exposure to COVID-19 virus  -     POCT SARS-CoV-2 Antigen PETE + Flu    3. Acquired hypothyroidism  -     TSH  -     T4, Free    4. Depression with anxiety  Assessment & Plan:  Anxiety is uncontrolled.  Depression improved with celexa.  Willl add in buspar.  Discussed potential side effects of the medication and how if may be taken.       Other orders  -     oseltamivir (Tamiflu) 75 MG capsule; Take 1 capsule by mouth 2 (Two) Times a Day.  Dispense: 10 capsule; Refill: 0  -     busPIRone (BUSPAR) 5 MG tablet; Take 1 tablet by mouth 2 (Two) Times a Day As Needed (anxiety).  Dispense: 60 tablet; Refill: 5        New Medications Ordered This Visit   Medications    oseltamivir (Tamiflu) 75 MG capsule     Sig: Take 1 capsule by mouth 2 (Two) Times a Day.     Dispense:  10 capsule     Refill:  0    busPIRone (BUSPAR) 5 MG tablet     Sig: Take 1 tablet by mouth 2 (Two) Times a Day As Needed (anxiety).     Dispense:  60 tablet     Refill:  5       No orders of the defined types were placed in this encounter.      Return in about 1 month (around 11/24/2023) for anxiety.    Edie Grant DO

## 2023-10-25 LAB
T4 FREE SERPL-MCNC: 1.35 NG/DL (ref 0.82–1.77)
TSH SERPL DL<=0.005 MIU/L-ACNC: 0.12 UIU/ML (ref 0.45–4.5)

## 2023-10-25 NOTE — ASSESSMENT & PLAN NOTE
Anxiety is uncontrolled.  Depression improved with celexa.  Willl add in buspar.  Discussed potential side effects of the medication and how if may be taken.

## 2023-10-25 NOTE — ASSESSMENT & PLAN NOTE
Known COVID exposure, but positive for flu with mild symptoms.  Will treat with tamiflu. May take OTC medications for symptomatic relief.

## 2023-10-26 ENCOUNTER — PATIENT MESSAGE (OUTPATIENT)
Dept: INTERNAL MEDICINE | Facility: CLINIC | Age: 23
End: 2023-10-26
Payer: COMMERCIAL

## 2023-10-26 RX ORDER — FLUCONAZOLE 150 MG/1
150 TABLET ORAL ONCE
Qty: 1 TABLET | Refills: 0 | Status: SHIPPED | OUTPATIENT
Start: 2023-10-26 | End: 2023-10-26

## 2023-10-29 RX ORDER — LEVOTHYROXINE SODIUM 0.05 MG/1
50 TABLET ORAL DAILY
Qty: 90 TABLET | Refills: 1 | Status: SHIPPED | OUTPATIENT
Start: 2023-10-29

## 2023-12-02 ENCOUNTER — OFFICE VISIT (OUTPATIENT)
Dept: INTERNAL MEDICINE | Facility: CLINIC | Age: 23
End: 2023-12-02
Payer: COMMERCIAL

## 2023-12-02 VITALS
BODY MASS INDEX: 31.28 KG/M2 | DIASTOLIC BLOOD PRESSURE: 82 MMHG | HEART RATE: 98 BPM | OXYGEN SATURATION: 98 % | SYSTOLIC BLOOD PRESSURE: 122 MMHG | WEIGHT: 170 LBS | TEMPERATURE: 98 F | HEIGHT: 62 IN

## 2023-12-02 DIAGNOSIS — R10.10 PAIN OF UPPER ABDOMEN: ICD-10-CM

## 2023-12-02 DIAGNOSIS — F41.8 DEPRESSION WITH ANXIETY: Primary | ICD-10-CM

## 2023-12-02 PROCEDURE — 99214 OFFICE O/P EST MOD 30 MIN: CPT | Performed by: FAMILY MEDICINE

## 2023-12-02 RX ORDER — BUPROPION HYDROCHLORIDE 150 MG/1
150 TABLET ORAL DAILY
Qty: 90 TABLET | Refills: 3 | Status: SHIPPED | OUTPATIENT
Start: 2023-12-02

## 2023-12-02 NOTE — PROGRESS NOTES
Rachel Pereira is a 23 y.o. female.    Chief Complaint   Patient presents with    Anxiety    Abdominal Pain     Upper right Quad, notices worse when drinking any alcohol.        HPI     The patient is a 23-year-old female who presents today to follow up on anxiety. She also reports right upper quadrant abdominal pain.    She has been having right upper quadrant abdominal pain for 2 months. She describes the pain as aching and sharp. The pain goes away sometimes for a couple of days. She had a cholecystectomy last year, 2022. She did not have any gallstones. She has not noticed anything that makes the pain worse, but anytime she drinks any alcohol, it does hurt the same night and the day after. Last night,12/03/2023, she was wrapping presents and she was fine, but then she was in severe pain. She denies any gas issues. She denies any heartburn or reflux. She has diarrhea but has not seen gastroenterology recently. She denies any chest pain or dyspnea. She has mild nausea. She has normal bowel movements once or twice a day, but some days she has multiple bowel movements.    She started seeing her therapist for her anxiety and depression. She feels sad and down frequently. She does not cry a lot, but she is easily agitated sometimes. She takes BuSpar at least once daily. If she gets more anxious, she will take it again. She usually takes it regularly with her medicine in the morning. She believes the Celexa has helped a good amount. She is not always thinking about wanting to die, but she is constantly down on herself.    Her sister-in-law informed her eyes were a little yellow.    If she drinks alcohol, she only has 1 or 2 drinks.    She denies any family history of liver issues.    The following portions of the patient's history were reviewed and updated as appropriate: allergies, current medications, past family history, past medical history, past social history, past surgical history and problem list.    "  Allergies   Allergen Reactions    Sulfa Antibiotics Rash         Current Outpatient Medications:     busPIRone (BUSPAR) 5 MG tablet, Take 1 tablet by mouth 2 (Two) Times a Day As Needed (anxiety)., Disp: 60 tablet, Rfl: 5    citalopram (CeleXA) 40 MG tablet, Take 1 tablet by mouth Daily., Disp: 90 tablet, Rfl: 3    levothyroxine (Synthroid) 50 MCG tablet, Take 1 tablet by mouth Daily., Disp: 90 tablet, Rfl: 1    metFORMIN (GLUCOPHAGE) 500 MG tablet, TAKE 1 TABLET BY MOUTH TWICE A DAY WITH MEALS, Disp: 60 tablet, Rfl: 2    ondansetron ODT (ZOFRAN-ODT) 4 MG disintegrating tablet, Place 1 tablet under the tongue Every 8 (Eight) Hours As Needed for Nausea or Vomiting., Disp: 20 tablet, Rfl: 0    vitamin D3 125 MCG (5000 UT) capsule capsule, Take 1 capsule by mouth Daily., Disp: , Rfl:     buPROPion XL (Wellbutrin XL) 150 MG 24 hr tablet, Take 1 tablet by mouth Daily., Disp: 90 tablet, Rfl: 3    ROS    Review of Systems   Constitutional:  Negative for fever and unexpected weight loss.   Respiratory:  Negative for shortness of breath.    Cardiovascular:  Negative for chest pain.   Gastrointestinal:  Positive for abdominal pain, diarrhea and nausea. Negative for constipation and vomiting.   Genitourinary:  Positive for frequency. Negative for dysuria and hematuria.   Musculoskeletal:  Positive for myalgias. Negative for arthralgias.   Psychiatric/Behavioral:  The patient is not nervous/anxious.        Vitals:    12/02/23 1058   BP: 122/82   BP Location: Right arm   Patient Position: Sitting   Cuff Size: Adult   Pulse: 98   Temp: 98 °F (36.7 °C)   SpO2: 98%   Weight: 77.1 kg (170 lb)   Height: 157.5 cm (62\")   PainSc:   3         Physical Exam     Physical Exam  Constitutional:       General: She is not in acute distress.     Appearance: She is well-developed.   HENT:      Head: Normocephalic and atraumatic.      Right Ear: External ear normal.      Left Ear: External ear normal.   Eyes:      Extraocular Movements: " Extraocular movements intact.      Conjunctiva/sclera: Conjunctivae normal.   Cardiovascular:      Rate and Rhythm: Normal rate and regular rhythm.      Heart sounds: No murmur heard.  Pulmonary:      Effort: Pulmonary effort is normal. No respiratory distress.      Breath sounds: Normal breath sounds. No wheezing.   Abdominal:      General: Bowel sounds are normal. There is no distension.      Palpations: Abdomen is soft.      Tenderness: There is no abdominal tenderness in the right upper quadrant and epigastric area.   Musculoskeletal:      Right lower leg: No edema.      Left lower leg: No edema.   Skin:     General: Skin is warm and dry.   Neurological:      Mental Status: She is alert and oriented to person, place, and time.      Cranial Nerves: No cranial nerve deficit.   Psychiatric:         Mood and Affect: Mood normal.         Behavior: Behavior normal.         Assessment/Plan    Diagnoses and all orders for this visit:    1. Depression with anxiety (Primary)  Assessment & Plan:  This is uncontrolled on current medications. She will continue Celexa and BuSpar. I will add Wellbutrin. I discussed potential side effects of the medication.        2. Pain of upper abdomen  Comments:  Obtain labs for further evaluation.She is status post cholecystectomy.Depending on lab results, she may benefit from a referral to gastroenterology.  Orders:  -     Comprehensive Metabolic Panel  -     CBC & Differential  -     Lipase  -     Amylase  -     H. Pylori Breath Test - Breath, Lung    Other orders  -     buPROPion XL (Wellbutrin XL) 150 MG 24 hr tablet; Take 1 tablet by mouth Daily.  Dispense: 90 tablet; Refill: 3        New Medications Ordered This Visit   Medications    buPROPion XL (Wellbutrin XL) 150 MG 24 hr tablet     Sig: Take 1 tablet by mouth Daily.     Dispense:  90 tablet     Refill:  3       No orders of the defined types were placed in this encounter.      Return in about 6 weeks (around 1/13/2024) for  anxiety and depression.    Edie Grant DO    Transcribed from ambient dictation for Edie Grant DO by Elsy Shah.  12/04/23   09:21 EST    Patient or patient representative verbalized consent to the visit recording.  I have personally performed the services described in this document as transcribed by the above individual, and it is both accurate and complete.  Edie Grant DO  12/10/2023  20:53 EST

## 2023-12-04 NOTE — ASSESSMENT & PLAN NOTE
This is uncontrolled on current medications. She will continue Celexa and BuSpar. I will add Wellbutrin. I discussed potential side effects of the medication.

## 2023-12-05 LAB
ALBUMIN SERPL-MCNC: 4.7 G/DL (ref 3.5–5.2)
ALBUMIN/GLOB SERPL: 2.2 G/DL
ALP SERPL-CCNC: 125 U/L (ref 39–117)
ALT SERPL-CCNC: 21 U/L (ref 1–33)
AMYLASE SERPL-CCNC: 85 U/L (ref 28–100)
AST SERPL-CCNC: 18 U/L (ref 1–32)
BASOPHILS # BLD AUTO: 0.06 10*3/MM3 (ref 0–0.2)
BASOPHILS NFR BLD AUTO: 0.5 % (ref 0–1.5)
BILIRUB SERPL-MCNC: 0.7 MG/DL (ref 0–1.2)
BUN SERPL-MCNC: 7 MG/DL (ref 6–20)
BUN/CREAT SERPL: 10 (ref 7–25)
CALCIUM SERPL-MCNC: 10.1 MG/DL (ref 8.6–10.5)
CHLORIDE SERPL-SCNC: 102 MMOL/L (ref 98–107)
CO2 SERPL-SCNC: 28.3 MMOL/L (ref 22–29)
CREAT SERPL-MCNC: 0.7 MG/DL (ref 0.57–1)
EGFRCR SERPLBLD CKD-EPI 2021: 125.6 ML/MIN/1.73
EOSINOPHIL # BLD AUTO: 0.2 10*3/MM3 (ref 0–0.4)
EOSINOPHIL NFR BLD AUTO: 1.8 % (ref 0.3–6.2)
ERYTHROCYTE [DISTWIDTH] IN BLOOD BY AUTOMATED COUNT: 12.6 % (ref 12.3–15.4)
GLOBULIN SER CALC-MCNC: 2.1 GM/DL
GLUCOSE SERPL-MCNC: 90 MG/DL (ref 65–99)
HCT VFR BLD AUTO: 41.8 % (ref 34–46.6)
HGB BLD-MCNC: 13.4 G/DL (ref 12–15.9)
IMM GRANULOCYTES # BLD AUTO: 0.06 10*3/MM3 (ref 0–0.05)
IMM GRANULOCYTES NFR BLD AUTO: 0.5 % (ref 0–0.5)
LIPASE SERPL-CCNC: 22 U/L (ref 13–60)
LYMPHOCYTES # BLD AUTO: 2.99 10*3/MM3 (ref 0.7–3.1)
LYMPHOCYTES NFR BLD AUTO: 26.5 % (ref 19.6–45.3)
MCH RBC QN AUTO: 27.9 PG (ref 26.6–33)
MCHC RBC AUTO-ENTMCNC: 32.1 G/DL (ref 31.5–35.7)
MCV RBC AUTO: 86.9 FL (ref 79–97)
MONOCYTES # BLD AUTO: 0.77 10*3/MM3 (ref 0.1–0.9)
MONOCYTES NFR BLD AUTO: 6.8 % (ref 5–12)
NEUTROPHILS # BLD AUTO: 7.22 10*3/MM3 (ref 1.7–7)
NEUTROPHILS NFR BLD AUTO: 63.9 % (ref 42.7–76)
NRBC BLD AUTO-RTO: 0 /100 WBC (ref 0–0.2)
PLATELET # BLD AUTO: 421 10*3/MM3 (ref 140–450)
POTASSIUM SERPL-SCNC: 4.5 MMOL/L (ref 3.5–5.2)
PROT SERPL-MCNC: 6.8 G/DL (ref 6–8.5)
RBC # BLD AUTO: 4.81 10*6/MM3 (ref 3.77–5.28)
SODIUM SERPL-SCNC: 139 MMOL/L (ref 136–145)
UREA BREATH TEST QL: NEGATIVE
WBC # BLD AUTO: 11.3 10*3/MM3 (ref 3.4–10.8)

## 2023-12-14 DIAGNOSIS — R10.10 PAIN OF UPPER ABDOMEN: Primary | ICD-10-CM

## 2023-12-18 NOTE — TELEPHONE ENCOUNTER
From: Rachel Pereira  To: Edie Grant  Sent: 10/26/2023 1:36 PM EDT  Subject: Yeast infection     Hello I was just there 2 days ago. I now think I have a yeast infection and was gonna see if I had to make an appointment or if there’s anyway I could just get the prescription sent in so I don’t have to come in again cause I cannot until next week. I’m having very yellow/chunky discharge and itching.   
graduate school

## 2024-01-11 ENCOUNTER — OFFICE VISIT (OUTPATIENT)
Dept: INTERNAL MEDICINE | Facility: CLINIC | Age: 24
End: 2024-01-11
Payer: COMMERCIAL

## 2024-01-11 VITALS
HEART RATE: 78 BPM | OXYGEN SATURATION: 97 % | SYSTOLIC BLOOD PRESSURE: 122 MMHG | TEMPERATURE: 98 F | DIASTOLIC BLOOD PRESSURE: 82 MMHG | BODY MASS INDEX: 31.1 KG/M2 | WEIGHT: 169 LBS | HEIGHT: 62 IN

## 2024-01-11 DIAGNOSIS — R30.9 URINARY PAIN: ICD-10-CM

## 2024-01-11 DIAGNOSIS — R11.0 NAUSEA: ICD-10-CM

## 2024-01-11 DIAGNOSIS — R05.1 ACUTE COUGH: ICD-10-CM

## 2024-01-11 DIAGNOSIS — R09.81 CONGESTION OF NASAL SINUS: ICD-10-CM

## 2024-01-11 DIAGNOSIS — J06.9 UPPER RESPIRATORY TRACT INFECTION, UNSPECIFIED TYPE: ICD-10-CM

## 2024-01-11 DIAGNOSIS — R31.9 URINARY TRACT INFECTION WITH HEMATURIA, SITE UNSPECIFIED: Primary | ICD-10-CM

## 2024-01-11 DIAGNOSIS — N39.0 URINARY TRACT INFECTION WITH HEMATURIA, SITE UNSPECIFIED: Primary | ICD-10-CM

## 2024-01-11 LAB
BILIRUB BLD-MCNC: NEGATIVE MG/DL
CLARITY, POC: CLEAR
COLOR UR: YELLOW
EXPIRATION DATE: ABNORMAL
EXPIRATION DATE: NORMAL
FLUAV AG UPPER RESP QL IA.RAPID: NOT DETECTED
FLUBV AG UPPER RESP QL IA.RAPID: NOT DETECTED
GLUCOSE UR STRIP-MCNC: NEGATIVE MG/DL
INTERNAL CONTROL: NORMAL
KETONES UR QL: NEGATIVE
LEUKOCYTE EST, POC: ABNORMAL
Lab: ABNORMAL
Lab: NORMAL
NITRITE UR-MCNC: NEGATIVE MG/ML
PH UR: 6 [PH] (ref 5–8)
PROT UR STRIP-MCNC: NEGATIVE MG/DL
RBC # UR STRIP: ABNORMAL /UL
SARS-COV-2 AG UPPER RESP QL IA.RAPID: NOT DETECTED
SP GR UR: 1.01 (ref 1–1.03)
UROBILINOGEN UR QL: NORMAL

## 2024-01-11 PROCEDURE — 87428 SARSCOV & INF VIR A&B AG IA: CPT | Performed by: FAMILY MEDICINE

## 2024-01-11 PROCEDURE — 99213 OFFICE O/P EST LOW 20 MIN: CPT | Performed by: FAMILY MEDICINE

## 2024-01-11 PROCEDURE — 81003 URINALYSIS AUTO W/O SCOPE: CPT | Performed by: FAMILY MEDICINE

## 2024-01-11 RX ORDER — ONDANSETRON 4 MG/1
4 TABLET, ORALLY DISINTEGRATING ORAL EVERY 8 HOURS PRN
Qty: 20 TABLET | Refills: 0 | Status: SHIPPED | OUTPATIENT
Start: 2024-01-11

## 2024-01-11 RX ORDER — CEFDINIR 300 MG/1
300 CAPSULE ORAL 2 TIMES DAILY
Qty: 14 CAPSULE | Refills: 0 | Status: SHIPPED | OUTPATIENT
Start: 2024-01-11 | End: 2024-01-18

## 2024-01-11 NOTE — ASSESSMENT & PLAN NOTE
The patient has had a positive flu exposure but is negative for both flu and COVID-19 in office today. I encouraged over the counter cold and cough medication. I advised if she does have a bacterial infection, then the cefdinir should cover that as well.

## 2024-01-11 NOTE — PROGRESS NOTES
Rachel Pereira is a 23 y.o. female.    Chief Complaint   Patient presents with    Cough     Feels it in her chest, and some mucus.     Urinary Tract Infection     Feels pressure when she goes to the Bathroom. Has also had white discharge.     Fever       HPI     Rachel Pereira is a 23-year-old female who presents today with UTI symptoms, fever, and cough.    Patient reports urinary problems for 2 day(s). She admits to white clumps in her urine and lower abdominal pain. She has back pain when she has cramps. She denies any burning sensation with urination. She has not tried anything for this issue.     Patient reports they have not been feeling well for 2 days and her symptoms started 1 to 2 days ago. She is coughing up both dry and hacking. She is not sure if she has drainage going down the back of her throat. Her throat is scratchy and sore.  She had a fever of 100.6 yesterday.  She does not have any Zofran at home for nausea and she took some of her 's Tamiflu because she thought she had influenza. She has not taken any cold and cough medicine.    The following portions of the patient's history were reviewed and updated as appropriate: allergies, current medications, past family history, past medical history, past social history, past surgical history and problem list.     Allergies   Allergen Reactions    Sulfa Antibiotics Rash         Current Outpatient Medications:     buPROPion XL (Wellbutrin XL) 150 MG 24 hr tablet, Take 1 tablet by mouth Daily., Disp: 90 tablet, Rfl: 3    busPIRone (BUSPAR) 5 MG tablet, Take 1 tablet by mouth 2 (Two) Times a Day As Needed (anxiety)., Disp: 60 tablet, Rfl: 5    citalopram (CeleXA) 40 MG tablet, Take 1 tablet by mouth Daily., Disp: 90 tablet, Rfl: 3    levothyroxine (Synthroid) 50 MCG tablet, Take 1 tablet by mouth Daily., Disp: 90 tablet, Rfl: 1    metFORMIN (GLUCOPHAGE) 500 MG tablet, TAKE 1 TABLET BY MOUTH TWICE A DAY WITH MEALS, Disp: 60 tablet, Rfl: 2     "ondansetron ODT (ZOFRAN-ODT) 4 MG disintegrating tablet, Place 1 tablet under the tongue Every 8 (Eight) Hours As Needed for Nausea or Vomiting., Disp: 20 tablet, Rfl: 0    vitamin D3 125 MCG (5000 UT) capsule capsule, Take 1 capsule by mouth Daily., Disp: , Rfl:     cefdinir (OMNICEF) 300 MG capsule, Take 1 capsule by mouth 2 (Two) Times a Day for 7 days., Disp: 14 capsule, Rfl: 0    ROS    Review of Systems   Constitutional:  Positive for fever.   HENT:  Positive for postnasal drip and sore throat. Negative for ear pain.    Respiratory:  Positive for cough.    Gastrointestinal:  Positive for abdominal pain and nausea.   Genitourinary:  Positive for hematuria.   Musculoskeletal:  Positive for back pain.       Vitals:    01/11/24 0917   BP: 122/82   BP Location: Left arm   Patient Position: Sitting   Cuff Size: Adult   Pulse: 78   Temp: 98 °F (36.7 °C)   SpO2: 97%   Weight: 76.7 kg (169 lb)   Height: 157.5 cm (62\")   PainSc: 0-No pain         Physical Exam     Physical Exam  Constitutional:       General: She is not in acute distress.     Appearance: Normal appearance. She is well-developed.   HENT:      Head: Normocephalic and atraumatic.      Right Ear: Tympanic membrane and external ear normal.      Left Ear: Tympanic membrane and external ear normal.      Mouth/Throat:      Pharynx: Posterior oropharyngeal erythema present.   Eyes:      Extraocular Movements: Extraocular movements intact.      Conjunctiva/sclera: Conjunctivae normal.   Cardiovascular:      Rate and Rhythm: Normal rate and regular rhythm.      Heart sounds: No murmur heard.  Pulmonary:      Effort: Pulmonary effort is normal. No respiratory distress.      Breath sounds: Normal breath sounds. No wheezing.   Abdominal:      General: Bowel sounds are normal. There is no distension.      Palpations: Abdomen is soft.      Tenderness: There is no abdominal tenderness. There is right CVA tenderness. There is no left CVA tenderness.   Skin:     General: " Skin is warm and dry.   Neurological:      Mental Status: She is alert and oriented to person, place, and time.      Cranial Nerves: No cranial nerve deficit.   Psychiatric:         Mood and Affect: Mood normal.         Behavior: Behavior normal.       Assessment/Plan    Diagnoses and all orders for this visit:    1. Urinary tract infection with hematuria, site unspecified (Primary)  Assessment & Plan:  I will go ahead and treat with cefdinir. I will send urine for culture as well.      2. Upper respiratory tract infection, unspecified type  Assessment & Plan:  The patient has had a positive flu exposure but is negative for both flu and COVID-19 in office today. I encouraged over the counter cold and cough medication. I advised if she does have a bacterial infection, then the cefdinir should cover that as well.      3. Urinary pain  -     POCT urinalysis dipstick, automated  -     Urine Culture - Urine, Urine, Clean Catch    4. Acute cough  -     POCT SARS-CoV-2 Antigen PETE + Flu    5. Congestion of nasal sinus  -     POCT SARS-CoV-2 Antigen PETE + Flu    6. Nausea  -     ondansetron ODT (ZOFRAN-ODT) 4 MG disintegrating tablet; Place 1 tablet under the tongue Every 8 (Eight) Hours As Needed for Nausea or Vomiting.  Dispense: 20 tablet; Refill: 0    Other orders  -     cefdinir (OMNICEF) 300 MG capsule; Take 1 capsule by mouth 2 (Two) Times a Day for 7 days.  Dispense: 14 capsule; Refill: 0        New Medications Ordered This Visit   Medications    ondansetron ODT (ZOFRAN-ODT) 4 MG disintegrating tablet     Sig: Place 1 tablet under the tongue Every 8 (Eight) Hours As Needed for Nausea or Vomiting.     Dispense:  20 tablet     Refill:  0    cefdinir (OMNICEF) 300 MG capsule     Sig: Take 1 capsule by mouth 2 (Two) Times a Day for 7 days.     Dispense:  14 capsule     Refill:  0       No orders of the defined types were placed in this encounter.      No follow-ups on file.    Edie Grant DO    Transcribed from  ambient dictation for Edie Grant DO by Shawn Guerrero.  01/11/24   11:28 EST    Patient or patient representative verbalized consent to the visit recording.  I have personally performed the services described in this document as transcribed by the above individual, and it is both accurate and complete.  Edie Grant DO  1/11/2024  13:21 EST

## 2024-01-13 LAB
BACTERIA UR CULT: NO GROWTH
BACTERIA UR CULT: NORMAL

## 2024-01-14 ENCOUNTER — PATIENT MESSAGE (OUTPATIENT)
Dept: INTERNAL MEDICINE | Facility: CLINIC | Age: 24
End: 2024-01-14
Payer: COMMERCIAL

## 2024-01-14 DIAGNOSIS — J06.9 UPPER RESPIRATORY TRACT INFECTION, UNSPECIFIED TYPE: Primary | ICD-10-CM

## 2024-01-15 RX ORDER — METHYLPREDNISOLONE 4 MG/1
TABLET ORAL
Qty: 21 EACH | Refills: 0 | Status: SHIPPED | OUTPATIENT
Start: 2024-01-15

## 2024-01-15 RX ORDER — DEXTROMETHORPHAN HYDROBROMIDE AND PROMETHAZINE HYDROCHLORIDE 15; 6.25 MG/5ML; MG/5ML
5 SYRUP ORAL 4 TIMES DAILY PRN
Qty: 240 ML | Refills: 1 | Status: SHIPPED | OUTPATIENT
Start: 2024-01-15

## 2024-01-15 NOTE — TELEPHONE ENCOUNTER
From: Rachel Pereira  To: Edie Grant  Sent: 1/14/2024 6:23 PM EST  Subject: Misdiagnosed for not having the flu or have something you all did not check for     Hello I came in last week on Thursday and was told I was negative for the flu and for covid. I think either those tests were wrong or you all did not check for RSV or something else and just summed it up to saying my fever was from a UTI. I have been awfully sick for a week now. I have gone through all of my cough medicine and wasted $60 on my doctors appointment last week and buying medicine that has not helped, I still keep getting high fevers. I have had a horrible cough. Drainage. Headache. Have a hard time staying awake for a full day and have been sleeping most days. I have had horrible night sweats every night….I was told I just needed cough medicine and to take my UTI meds by Dr Grant but nothing is helping. I now do not have the money to go back into the doctor or to buy more cough medicine because I used what money I had on my appointment last week. I wish I would have been taken more seriously at my appointment and not yelled at for assuming I had the flu. If I could   get something prescribed to me for my cough and fever that would be great. Again I cannot afford to pay for a co pay to come back in.

## 2024-02-06 NOTE — TELEPHONE ENCOUNTER
Rx Refill Note  Requested Prescriptions     Pending Prescriptions Disp Refills    metFORMIN (GLUCOPHAGE) 500 MG tablet [Pharmacy Med Name: METFORMIN  MG TABLET] 180 tablet 1     Sig: TAKE 1 TABLET BY MOUTH TWICE A DAY WITH FOOD      Last office visit with prescribing clinician: 1/11/2024   Last telemedicine visit with prescribing clinician: Visit date not found   Next office visit with prescribing clinician: Visit date not found                         Fiordaliza Amanda MA  02/06/24, 08:29 EST

## 2024-02-19 ENCOUNTER — OFFICE VISIT (OUTPATIENT)
Dept: INTERNAL MEDICINE | Facility: CLINIC | Age: 24
End: 2024-02-19
Payer: COMMERCIAL

## 2024-02-19 VITALS
BODY MASS INDEX: 30.91 KG/M2 | SYSTOLIC BLOOD PRESSURE: 112 MMHG | DIASTOLIC BLOOD PRESSURE: 66 MMHG | RESPIRATION RATE: 16 BRPM | HEART RATE: 75 BPM | HEIGHT: 62 IN | WEIGHT: 168 LBS | OXYGEN SATURATION: 95 %

## 2024-02-19 DIAGNOSIS — N39.0 RECURRENT URINARY TRACT INFECTION: Primary | ICD-10-CM

## 2024-02-19 LAB
BILIRUB BLD-MCNC: ABNORMAL MG/DL
CLARITY, POC: ABNORMAL
COLOR UR: ABNORMAL
EXPIRATION DATE: ABNORMAL
GLUCOSE UR STRIP-MCNC: NEGATIVE MG/DL
KETONES UR QL: ABNORMAL
LEUKOCYTE EST, POC: ABNORMAL
Lab: ABNORMAL
NITRITE UR-MCNC: POSITIVE MG/ML
PH UR: 5.5 [PH] (ref 5–8)
PROT UR STRIP-MCNC: NEGATIVE MG/DL
RBC # UR STRIP: ABNORMAL /UL
SP GR UR: 1.01 (ref 1–1.03)
UROBILINOGEN UR QL: ABNORMAL

## 2024-02-19 PROCEDURE — 99213 OFFICE O/P EST LOW 20 MIN: CPT | Performed by: STUDENT IN AN ORGANIZED HEALTH CARE EDUCATION/TRAINING PROGRAM

## 2024-02-19 PROCEDURE — 81003 URINALYSIS AUTO W/O SCOPE: CPT | Performed by: STUDENT IN AN ORGANIZED HEALTH CARE EDUCATION/TRAINING PROGRAM

## 2024-02-19 RX ORDER — NITROFURANTOIN 25; 75 MG/1; MG/1
100 CAPSULE ORAL 2 TIMES DAILY
Qty: 14 CAPSULE | Refills: 0 | Status: SHIPPED | OUTPATIENT
Start: 2024-02-19

## 2024-02-19 NOTE — ASSESSMENT & PLAN NOTE
Urinalysis in office showed positive for nitrates and ketones.  Start nitrofurantoin for 7 days  Will obtain urine culture  Refer to urology

## 2024-02-19 NOTE — PROGRESS NOTES
Office Note     Name: Rachel Pereira    : 2000     MRN: [id number]     Chief Complaint  Difficulty Urinating (Started Wednesday or Thursday )    Subjective     History of Present Illness:  Rachel Pereira is a 23 y.o. female who presents today for dysuria.  For the past 5 days, she has noted pelvic pain and dysuria noting burning sensation when she is urinating.  Denies fever, diarrhea, difficulty breathing or cough.    She had a history of urinary tract infection on 2023 with urine culture showing Enterococcus faecalis.  Sensitive to nitrofurantoin and ciprofloxacin.  She eventually completed a course of nitrofurantoin at that time.  She has had 2-3 urinary tract infections with occasional vaginal yeast infections within the past 12 months.    Family History:   Family History   Problem Relation Age of Onset    Hypertension Father     Depression Father     Anxiety disorder Father     Thyroid disease Mother     Depression Mother     Narcolepsy Mother     Anxiety disorder Mother     Other Brother         suspected autism    Heart disease Paternal Grandfather     Mental illness Maternal Grandmother     Heart disease Maternal Grandmother         probable    Diabetes Maternal Grandfather     Colon cancer Maternal Aunt     Arthritis Other     Thyroid disease Other     Diabetes Other     Hypertension Other     Mental illness Other     Heart disease Paternal Grandmother        Social History:   Social History     Socioeconomic History    Marital status:     Number of children: 0   Tobacco Use    Smoking status: Some Days     Types: Electronic Cigarette     Passive exposure: Yes    Smokeless tobacco: Never    Tobacco comments:     Do not smoke tobacco. Smoke Marijuana.   Vaping Use    Vaping Use: Never used   Substance and Sexual Activity    Alcohol use: Yes     Alcohol/week: 1.0 - 2.0 standard drink of alcohol     Types: 1 - 2 Glasses of wine per week    Drug use: Never    Sexual activity: Yes      "Partners: Male     Birth control/protection: None       Health Maintenance   Topic Date Due    HEPATITIS C SCREENING  Never done    ANNUAL PHYSICAL  08/15/2018    HPV VACCINES (2 - 3-dose series) 12/03/2018    COVID-19 Vaccine (3 - 2023-24 season) 02/21/2024 (Originally 9/1/2023)    INFLUENZA VACCINE  03/31/2024 (Originally 8/1/2023)    Pneumococcal Vaccine 0-64 (1 of 2 - PCV) 06/26/2024 (Originally 8/8/2007)    BMI FOLLOWUP  10/24/2024    CHLAMYDIA SCREENING  11/21/2024    PAP SMEAR  09/15/2026    TDAP/TD VACCINES (2 - Td or Tdap) 06/16/2027    MENINGOCOCCAL VACCINE  Completed       Objective     Vital Signs  /66   Pulse 75   Resp 16   Ht 157.5 cm (62.01\")   Wt 76.2 kg (168 lb)   SpO2 95%   BMI 30.72 kg/m²   Estimated body mass index is 30.72 kg/m² as calculated from the following:    Height as of this encounter: 157.5 cm (62.01\").    Weight as of this encounter: 76.2 kg (168 lb).        Physical Exam  Vitals and nursing note reviewed.   Constitutional:       Appearance: Normal appearance.   HENT:      Head: Normocephalic and atraumatic.   Cardiovascular:      Rate and Rhythm: Normal rate and regular rhythm.      Pulses: Normal pulses.      Heart sounds: Normal heart sounds.   Pulmonary:      Effort: Pulmonary effort is normal. No respiratory distress.      Breath sounds: Normal breath sounds. No wheezing, rhonchi or rales.   Abdominal:      General: Abdomen is flat. Bowel sounds are normal.      Palpations: Abdomen is soft.      Tenderness: There is no abdominal tenderness. There is no guarding.   Musculoskeletal:      Cervical back: Neck supple.   Skin:     General: Skin is warm.      Capillary Refill: Capillary refill takes less than 2 seconds.   Neurological:      General: No focal deficit present.      Mental Status: She is alert. Mental status is at baseline.   Psychiatric:         Mood and Affect: Mood normal.         Behavior: Behavior normal.          Procedures     Assessment and Plan "     Diagnoses and all orders for this visit:    1. Recurrent urinary tract infection (Primary)  Assessment & Plan:  Urinalysis in office showed positive for nitrates and ketones.  Start nitrofurantoin for 7 days  Will obtain urine culture  Refer to urology    Orders:  -     Urine Culture - Urine, Urine, Clean Catch; Future  -     nitrofurantoin, macrocrystal-monohydrate, (Macrobid) 100 MG capsule; Take 1 capsule by mouth 2 (Two) Times a Day.  Dispense: 14 capsule; Refill: 0  -     POC Urinalysis Dipstick, Automated  -     Ambulatory Referral to Urology         Counseling was given to patient for the following topics: instructions for management, risks and benefits of treatment options, and importance of treatment compliance.    Follow Up  No follow-ups on file.    MD DANIE Wasserman [facility]  [address] [place] [id number] [phone]

## 2024-02-21 ENCOUNTER — TELEPHONE (OUTPATIENT)
Dept: INTERNAL MEDICINE | Facility: CLINIC | Age: 24
End: 2024-02-21
Payer: COMMERCIAL

## 2024-02-21 LAB
BACTERIA UR CULT: NORMAL
BACTERIA UR CULT: NORMAL

## 2024-02-23 ENCOUNTER — PATIENT ROUNDING (BHMG ONLY) (OUTPATIENT)
Dept: INTERNAL MEDICINE | Facility: CLINIC | Age: 24
End: 2024-02-23
Payer: COMMERCIAL

## 2024-02-23 NOTE — PROGRESS NOTES
2/23/2024    My name is Jesi, Patient Access Supervisor.        I want to officially thank you for your recent visit to our practice and ask about your recent visit.        If you could, tell me about your visit with us. What things went well?  Good      How was your experience with check-in and registation staff?  Good      Is there anything that we could do to improve your check-in process?  no        We're always looking for ways to make our patients' experiences even better. What can we do to improve your visits in the future?  no        Overall were you satisfied with your visit to our practice?  yes        I appreciate you taking the time to answer a few questions today.        Thank you, and have a great day!

## 2024-03-03 ENCOUNTER — PATIENT MESSAGE (OUTPATIENT)
Dept: INTERNAL MEDICINE | Facility: CLINIC | Age: 24
End: 2024-03-03
Payer: COMMERCIAL

## 2024-03-04 RX ORDER — TRAZODONE HYDROCHLORIDE 50 MG/1
50 TABLET ORAL NIGHTLY
Qty: 90 TABLET | Refills: 1 | Status: SHIPPED | OUTPATIENT
Start: 2024-03-04

## 2024-03-04 NOTE — TELEPHONE ENCOUNTER
From: Rachel Pereira  To: Edie Grant  Sent: 3/3/2024 7:10 AM EST  Subject: Trouble sleeping    I have had trouble falling asleep for a very long time and I have mentioned it to my doctor in the past letting you all know I take Benadryl every night to help me fall asleep. Well that is not working well anymore. I am not able to fall asleep unless I haven’t slept in a few days. Is there anything else I can try or anything you all can give me? I have been trying CBD/THC gummies as well to help but the CBD ones don’t work so well and THC seems to be the only thing that has helped recently but I don’t want to rely on those things.

## 2024-03-12 ENCOUNTER — PATIENT MESSAGE (OUTPATIENT)
Dept: INTERNAL MEDICINE | Facility: CLINIC | Age: 24
End: 2024-03-12
Payer: COMMERCIAL

## 2024-03-19 DIAGNOSIS — R11.0 NAUSEA: ICD-10-CM

## 2024-03-20 ENCOUNTER — PATIENT MESSAGE (OUTPATIENT)
Dept: INTERNAL MEDICINE | Facility: CLINIC | Age: 24
End: 2024-03-20
Payer: COMMERCIAL

## 2024-03-20 RX ORDER — ONDANSETRON 4 MG/1
4 TABLET, ORALLY DISINTEGRATING ORAL EVERY 8 HOURS PRN
Qty: 20 TABLET | Refills: 0 | Status: SHIPPED | OUTPATIENT
Start: 2024-03-20

## 2024-03-20 NOTE — TELEPHONE ENCOUNTER
From: Rachel Pereira  To: Edie Grant  Sent: 3/20/2024 9:13 AM EDT  Subject: Positive pregnancy test    Hello, I had just called and scheduled an appointment to check everything after getting a positive pregnancy test. I was also just wondering if all of the medicines I have are okay to take or if I need to stop taking any for the time being?

## 2024-04-03 ENCOUNTER — TELEPHONE (OUTPATIENT)
Dept: OBSTETRICS AND GYNECOLOGY | Facility: CLINIC | Age: 24
End: 2024-04-03
Payer: COMMERCIAL

## 2024-04-03 NOTE — TELEPHONE ENCOUNTER
----- Message from Rachel Pereira sent at 4/2/2024  5:21 PM EDT -----  Regarding: Pregnancy   Contact: [phone]  Hello, my appointment isn’t until May to have my first prenatal appointment. I have been having some cramping for the past week or two and the past two days I’ve had pink discharge. Last night I had a very bad cramp that only lasted a few minutes and ended up having a light amount of blood when I wiped. I have not had any more noticeable amounts of blood but still having the pink discharge. I was just checking to see if all of this is normal since this is my first pregnancy I don’t know what all to expect. Thank you :)

## 2024-04-16 ENCOUNTER — TELEPHONE (OUTPATIENT)
Dept: OBSTETRICS AND GYNECOLOGY | Facility: CLINIC | Age: 24
End: 2024-04-16
Payer: COMMERCIAL

## 2024-04-16 NOTE — TELEPHONE ENCOUNTER
Patient sent message and advised she works as a  and is having difficulty eating and getting enough fluids in while she is working. She wanted to see if she can have a note to be off for the remainder of the pregnancy, and have it start this Friday. I do not have available appointment to see patient this week, has appointment on 5/7/24.

## 2024-04-16 NOTE — TELEPHONE ENCOUNTER
She may have a note excusing her through 4/26/24. Advise small frequent meals, increased liquids and pascual products. She can get Unisom and Vitamin B6 to take at night also.

## 2024-04-17 ENCOUNTER — TELEPHONE (OUTPATIENT)
Dept: UROLOGY | Facility: CLINIC | Age: 24
End: 2024-04-17

## 2024-04-17 NOTE — TELEPHONE ENCOUNTER
LVM to inform the patient of her new date and time. Provider will be out of the office on her original appointment.

## 2024-04-23 ENCOUNTER — OFFICE VISIT (OUTPATIENT)
Dept: INTERNAL MEDICINE | Facility: CLINIC | Age: 24
End: 2024-04-23
Payer: COMMERCIAL

## 2024-04-23 VITALS
BODY MASS INDEX: 29.63 KG/M2 | HEART RATE: 106 BPM | TEMPERATURE: 98.1 F | DIASTOLIC BLOOD PRESSURE: 68 MMHG | HEIGHT: 62 IN | WEIGHT: 161 LBS | OXYGEN SATURATION: 98 % | SYSTOLIC BLOOD PRESSURE: 100 MMHG

## 2024-04-23 DIAGNOSIS — N89.8 VAGINAL DISCHARGE: ICD-10-CM

## 2024-04-23 DIAGNOSIS — J02.9 SORE THROAT: Primary | ICD-10-CM

## 2024-04-23 DIAGNOSIS — Z3A.08 8 WEEKS GESTATION OF PREGNANCY: ICD-10-CM

## 2024-04-23 DIAGNOSIS — B37.31 VAGINITIS DUE TO CANDIDA ALBICANS: ICD-10-CM

## 2024-04-23 LAB
EXPIRATION DATE: NORMAL
INTERNAL CONTROL: NORMAL
Lab: NORMAL
S PYO AG THROAT QL: NEGATIVE

## 2024-04-23 PROCEDURE — 87880 STREP A ASSAY W/OPTIC: CPT | Performed by: NURSE PRACTITIONER

## 2024-04-23 PROCEDURE — 99214 OFFICE O/P EST MOD 30 MIN: CPT | Performed by: NURSE PRACTITIONER

## 2024-04-24 DIAGNOSIS — R11.0 NAUSEA: ICD-10-CM

## 2024-04-25 RX ORDER — ONDANSETRON 4 MG/1
4 TABLET, ORALLY DISINTEGRATING ORAL EVERY 8 HOURS PRN
Qty: 20 TABLET | Refills: 0 | Status: SHIPPED | OUTPATIENT
Start: 2024-04-25

## 2024-04-27 LAB
A VAGINAE DNA VAG QL NAA+PROBE: ABNORMAL SCORE
BVAB2 DNA VAG QL NAA+PROBE: ABNORMAL SCORE
C ALBICANS DNA VAG QL NAA+PROBE: POSITIVE
C GLABRATA DNA VAG QL NAA+PROBE: NEGATIVE
M GENITALIUM DNA SPEC QL NAA+PROBE: NEGATIVE
M HOMINIS DNA SPEC QL NAA+PROBE: NEGATIVE
MEGA1 DNA VAG QL NAA+PROBE: ABNORMAL SCORE
UREAPLASMA DNA SPEC QL NAA+PROBE: NEGATIVE

## 2024-05-07 ENCOUNTER — INITIAL PRENATAL (OUTPATIENT)
Dept: OBSTETRICS AND GYNECOLOGY | Facility: CLINIC | Age: 24
End: 2024-05-07
Payer: COMMERCIAL

## 2024-05-07 VITALS — BODY MASS INDEX: 28.89 KG/M2 | WEIGHT: 158 LBS | DIASTOLIC BLOOD PRESSURE: 68 MMHG | SYSTOLIC BLOOD PRESSURE: 110 MMHG

## 2024-05-07 DIAGNOSIS — O36.80X0 ENCOUNTER TO DETERMINE FETAL VIABILITY OF PREGNANCY, SINGLE OR UNSPECIFIED FETUS: ICD-10-CM

## 2024-05-07 DIAGNOSIS — Z34.01 ENCOUNTER FOR SUPERVISION OF NORMAL FIRST PREGNANCY IN FIRST TRIMESTER: Primary | ICD-10-CM

## 2024-05-07 PROCEDURE — 0501F PRENATAL FLOW SHEET: CPT | Performed by: MIDWIFE

## 2024-05-11 LAB
ABO GROUP BLD: ABNORMAL
AMPHETAMINES UR QL SCN: NEGATIVE NG/ML
BARBITURATES UR QL SCN: NEGATIVE NG/ML
BASOPHILS # BLD AUTO: 0 X10E3/UL (ref 0–0.2)
BASOPHILS NFR BLD AUTO: 0 %
BENZODIAZ UR QL SCN: NEGATIVE NG/ML
BLD GP AB SCN SERPL QL: NEGATIVE
BZE UR QL SCN: NEGATIVE NG/ML
C TRACH RRNA SPEC QL NAA+PROBE: NEGATIVE
CANNABINOIDS UR QL SCN: NEGATIVE NG/ML
CFDNA.FET/CFDNA.TOTAL SFR FETUS: NORMAL %
CITATION REF LAB TEST: NORMAL
CREAT UR-MCNC: 164.4 MG/DL (ref 20–300)
EOSINOPHIL # BLD AUTO: 0.1 X10E3/UL (ref 0–0.4)
EOSINOPHIL NFR BLD AUTO: 1 %
ERYTHROCYTE [DISTWIDTH] IN BLOOD BY AUTOMATED COUNT: 12.7 % (ref 11.7–15.4)
FET 13+18+21+X+Y ANEUP PLAS.CFDNA: NEGATIVE
FET CHR 21 TS PLAS.CFDNA QL: NEGATIVE
FET MS X RISK WBC.DNA+CFDNA QL: NOT DETECTED
FET SEX PLAS.CFDNA DOSAGE CFDNA: NORMAL
FET TS 13 RISK PLAS.CFDNA QL: NEGATIVE
FET TS 18 RISK WBC.DNA+CFDNA QL: NEGATIVE
FET X + Y ANEUP RISK PLAS.CFDNA SEQ-IMP: NOT DETECTED
GA EST FROM CONCEPTION DATE: NORMAL D
GESTATIONAL AGE > 9:: YES
HBV SURFACE AG SERPL QL IA: NEGATIVE
HCT VFR BLD AUTO: 39.7 % (ref 34–46.6)
HCV IGG SERPL QL IA: NON REACTIVE
HGB BLD-MCNC: 12.9 G/DL (ref 11.1–15.9)
HIV 1+2 AB+HIV1 P24 AG SERPL QL IA: NON REACTIVE
IMM GRANULOCYTES # BLD AUTO: 0.2 X10E3/UL (ref 0–0.1)
IMM GRANULOCYTES NFR BLD AUTO: 1 %
LAB DIRECTOR NAME PROVIDER: NORMAL
LAB DIRECTOR NAME PROVIDER: NORMAL
LABORATORY COMMENT REPORT: NORMAL
LABORATORY COMMENT REPORT: NORMAL
LIMITATIONS OF THE TEST: NORMAL
LYMPHOCYTES # BLD AUTO: 2.1 X10E3/UL (ref 0.7–3.1)
LYMPHOCYTES NFR BLD AUTO: 14 %
MCH RBC QN AUTO: 29.8 PG (ref 26.6–33)
MCHC RBC AUTO-ENTMCNC: 32.5 G/DL (ref 31.5–35.7)
MCV RBC AUTO: 92 FL (ref 79–97)
METHADONE UR QL SCN: NEGATIVE NG/ML
MONOCYTES # BLD AUTO: 0.7 X10E3/UL (ref 0.1–0.9)
MONOCYTES NFR BLD AUTO: 5 %
N GONORRHOEA RRNA SPEC QL NAA+PROBE: NEGATIVE
NEGATIVE PREDICTIVE VALUE: NORMAL
NEUTROPHILS # BLD AUTO: 11.6 X10E3/UL (ref 1.4–7)
NEUTROPHILS NFR BLD AUTO: 79 %
NOTE: NORMAL
OPIATES UR QL SCN: NEGATIVE NG/ML
OXYCODONE+OXYMORPHONE UR QL SCN: NEGATIVE NG/ML
PCP UR QL: NEGATIVE NG/ML
PERFORMANCE CHARACTERISTICS: NORMAL
PH UR: 7.1 [PH] (ref 4.5–8.9)
PLATELET # BLD AUTO: 362 X10E3/UL (ref 150–450)
POSITIVE PREDICTIVE VALUE: NORMAL
PROPOXYPH UR QL SCN: NEGATIVE NG/ML
RBC # BLD AUTO: 4.33 X10E6/UL (ref 3.77–5.28)
REF LAB TEST METHOD: NORMAL
RH BLD: POSITIVE
RPR SER QL: NON REACTIVE
RUBV IGG SERPL IA-ACNC: 1.77 INDEX
T4 FREE SERPL-MCNC: 1.02 NG/DL (ref 0.82–1.77)
TEST PERFORMANCE INFO SPEC: NORMAL
TSH SERPL DL<=0.005 MIU/L-ACNC: 2.08 UIU/ML (ref 0.45–4.5)
WBC # BLD AUTO: 14.7 X10E3/UL (ref 3.4–10.8)

## 2024-06-04 ENCOUNTER — ROUTINE PRENATAL (OUTPATIENT)
Dept: OBSTETRICS AND GYNECOLOGY | Facility: CLINIC | Age: 24
End: 2024-06-04
Payer: COMMERCIAL

## 2024-06-04 VITALS — WEIGHT: 158.2 LBS | DIASTOLIC BLOOD PRESSURE: 62 MMHG | SYSTOLIC BLOOD PRESSURE: 110 MMHG | BODY MASS INDEX: 28.93 KG/M2

## 2024-06-04 DIAGNOSIS — Z34.92 SECOND TRIMESTER PREGNANCY: Primary | ICD-10-CM

## 2024-06-04 PROCEDURE — 0502F SUBSEQUENT PRENATAL CARE: CPT | Performed by: OBSTETRICS & GYNECOLOGY

## 2024-06-04 NOTE — PROGRESS NOTES
Chief Complaint   Patient presents with    Routine Prenatal Visit     Prenatal visit with no problems or concerns        HPI:   , 14w5d gestation reports doing well    ROS:  See Prenatal Episode/Flowsheet  /62   Wt 71.8 kg (158 lb 3.2 oz)   LMP 2024 (Approximate)   BMI 28.93 kg/m²      EXAM:  EXTREMITIES:  No swelling-See Prenatal Episode/Flowsheet    ABDOMEN:  FHTs/Movement noted-See Prenatal Episode/Flowsheet    URINE GLUCOSE/PROTEIN:  See Prenatal Episode/Flowsheet    PELVIC EXAM:  See Prenatal Episode/Flowsheet  CV:  Lungs:  GYN:    MDM:    Lab Results   Component Value Date    HGB 12.9 2024    RUBELLAABIGG 1.77 2024    HEPBSAG Negative 2024    ABO O 2024    RH Positive 2024    ABSCRN Negative 2024    RVL3SOE0 Non Reactive 2024    HEPCVIRUSABY Non Reactive 2024    URINECX Final report 04/10/2024       U/S:US Ob < 14 Weeks Single or First Gestation (2024 10:53)     1. IUP 14w5d  2. Routine care   3. Normal labs and genetic testing  4. Anatomy U/S next time  5. Hypothyroid: TSH normal one month ago   Synthroid 50mcg

## 2024-06-07 DIAGNOSIS — R11.0 NAUSEA: ICD-10-CM

## 2024-06-07 RX ORDER — ONDANSETRON 4 MG/1
4 TABLET, ORALLY DISINTEGRATING ORAL EVERY 8 HOURS PRN
Qty: 20 TABLET | Refills: 0 | Status: SHIPPED | OUTPATIENT
Start: 2024-06-07

## 2024-07-03 ENCOUNTER — ROUTINE PRENATAL (OUTPATIENT)
Dept: OBSTETRICS AND GYNECOLOGY | Facility: CLINIC | Age: 24
End: 2024-07-03
Payer: COMMERCIAL

## 2024-07-03 VITALS — WEIGHT: 163.4 LBS | SYSTOLIC BLOOD PRESSURE: 112 MMHG | DIASTOLIC BLOOD PRESSURE: 72 MMHG | BODY MASS INDEX: 29.88 KG/M2

## 2024-07-03 DIAGNOSIS — E03.9 HYPOTHYROIDISM AFFECTING PREGNANCY IN SECOND TRIMESTER: ICD-10-CM

## 2024-07-03 DIAGNOSIS — Z36.89 ENCOUNTER FOR FETAL ANATOMIC SURVEY: ICD-10-CM

## 2024-07-03 DIAGNOSIS — O99.282 HYPOTHYROIDISM AFFECTING PREGNANCY IN SECOND TRIMESTER: ICD-10-CM

## 2024-07-03 DIAGNOSIS — Z34.92 PRENATAL CARE IN SECOND TRIMESTER: Primary | ICD-10-CM

## 2024-07-03 PROCEDURE — 0502F SUBSEQUENT PRENATAL CARE: CPT | Performed by: STUDENT IN AN ORGANIZED HEALTH CARE EDUCATION/TRAINING PROGRAM

## 2024-07-03 RX ORDER — BUSPIRONE HYDROCHLORIDE 5 MG/1
TABLET ORAL
COMMUNITY
Start: 2024-06-13

## 2024-07-03 NOTE — PROGRESS NOTES
Prenatal Care Visit    Subjective   Chief Complaint   Patient presents with    Routine Prenatal Visit     Anatomy done today. No complaints.      History:   Rachel is a  currently at 18w6d who presents for a prenatal care visit today.    Doing well. Starting to feel fetal flutters. Denies CTX, VB, LOF.      Objective   /72   Wt 74.1 kg (163 lb 6.4 oz)   LMP 2024 (Approximate)   BMI 29.88 kg/m²   Physical Exam:  Normal, gestational age-appropriate exam today      Assessment & Plan     IUP @ 18w6d  Routine care: I have reviewed the prenatal labs and ultrasound(s) today. I have reviewed the most recent prenatal progress note(s). Anatomy scan today - all anatomy seen and cleared as normal. EFW 52%, AC 51%, vertex, posterior placenta.  Hypothyroidism: stable on synthroid 50mcg QD  Anxiety/ depression: stable on wellbutrin 150mg QD     Diagnosis Plan   1. Prenatal care in second trimester        2. Hypothyroidism affecting pregnancy in second trimester        3. Encounter for fetal anatomic survey  US Ob 14 + Weeks Single or First Gestation         Medication Management: continue PNV, synthroid, wellbutrin    Topics discussed: Prenatal care milestones  Kick counts and fetal movement   labor signs and symptoms   Tests next visit: none   Next visit: 4 week(s)     Margarita Figueroa MD  Obstetrics and Gynecology  [facility]

## 2024-07-06 ENCOUNTER — HOSPITAL ENCOUNTER (OUTPATIENT)
Facility: HOSPITAL | Age: 24
Discharge: HOME | End: 2024-07-06
Attending: STUDENT IN AN ORGANIZED HEALTH CARE EDUCATION/TRAINING PROGRAM | Admitting: STUDENT IN AN ORGANIZED HEALTH CARE EDUCATION/TRAINING PROGRAM
Payer: COMMERCIAL

## 2024-07-06 VITALS — BODY MASS INDEX: 30.18 KG/M2 | WEIGHT: 164 LBS | HEIGHT: 62 IN

## 2024-07-06 LAB
A1 MICROGLOB PLACENTAL VAG QL: NEGATIVE
BILIRUB BLD-MCNC: NEGATIVE MG/DL
CLARITY, POC: CLEAR
COLOR UR: YELLOW
GLUCOSE UR STRIP-MCNC: NEGATIVE MG/DL
KETONES UR QL: NEGATIVE
LEUKOCYTE EST, POC: NEGATIVE
NITRITE UR-MCNC: NEGATIVE MG/ML
PH UR: 7 [PH] (ref 5–8)
PROT UR STRIP-MCNC: NEGATIVE MG/DL
RBC # UR STRIP: NEGATIVE /UL
SP GR UR: 1 (ref 1–1.03)
UROBILINOGEN UR QL: NORMAL

## 2024-07-06 PROCEDURE — 81002 URINALYSIS NONAUTO W/O SCOPE: CPT | Performed by: STUDENT IN AN ORGANIZED HEALTH CARE EDUCATION/TRAINING PROGRAM

## 2024-07-06 PROCEDURE — 84112 EVAL AMNIOTIC FLUID PROTEIN: CPT | Performed by: STUDENT IN AN ORGANIZED HEALTH CARE EDUCATION/TRAINING PROGRAM

## 2024-07-06 PROCEDURE — G0463 HOSPITAL OUTPT CLINIC VISIT: HCPCS

## 2024-07-06 RX ORDER — SODIUM CHLORIDE 0.9 % (FLUSH) 0.9 %
10 SYRINGE (ML) INJECTION AS NEEDED
Status: DISCONTINUED | OUTPATIENT
Start: 2024-07-06 | End: 2024-07-06 | Stop reason: HOSPADM

## 2024-07-06 RX ORDER — SODIUM CHLORIDE 9 MG/ML
40 INJECTION, SOLUTION INTRAVENOUS AS NEEDED
Status: DISCONTINUED | OUTPATIENT
Start: 2024-07-06 | End: 2024-07-06 | Stop reason: HOSPADM

## 2024-07-06 RX ORDER — SODIUM CHLORIDE 0.9 % (FLUSH) 0.9 %
10 SYRINGE (ML) INJECTION EVERY 12 HOURS SCHEDULED
Status: DISCONTINUED | OUTPATIENT
Start: 2024-07-06 | End: 2024-07-06 | Stop reason: HOSPADM

## 2024-07-06 RX ORDER — LIDOCAINE HYDROCHLORIDE 10 MG/ML
0.5 INJECTION, SOLUTION INFILTRATION; PERINEURAL ONCE AS NEEDED
Status: DISCONTINUED | OUTPATIENT
Start: 2024-07-06 | End: 2024-07-06 | Stop reason: HOSPADM

## 2024-07-11 NOTE — NON STRESS TEST
Triage Note - Nursing Documentation  Labor and Delivery Admission Log    Rachel Pereira  : 2000  MRN: [id number]  CSN: [id number]    Date in / Time in:  2024  Time in:     Date out / Time out:  2024  Time out: 193    Nurse: Latricia Carrera RN    Patient Info: She is a 23 y.o. year old  at 20w0d with an SAMARIA of 2024, by Ultrasound who was seen on the [facility].    Chief Complaint:   Chief Complaint   Patient presents with    Abdominal Cramping    Leaking Fluid     Started        Provider Instructions / Disposition: PO hydration, Pt c/o leaking clear fluid and cramping, Amnisure test negative, active fetal movement, fetal heart rate via doppler 145. Pt states she has not felt cramping since arriving to . ABD palpates soft, not ctxs noted. DC home with follow up as scheduled in office    Patient Active Problem List   Diagnosis    Asthma    Depression with anxiety    Dysmenorrhea    Vitamin D deficiency    Oral contraceptive pill surveillance    RUQ abdominal pain    Screening examination for STD (sexually transmitted disease)    Female dyspareunia    Dyschezia    Hematochezia    Internal hemorrhoid    Biliary dyskinesia    Bright red rectal bleeding    Candidiasis of vulva and vagina    Acquired hypothyroidism    Class 1 obesity without serious comorbidity with body mass index (BMI) of 31.0 to 31.9 in adult    Influenza A    Upper respiratory tract infection    Urinary tract infection with hematuria    Recurrent urinary tract infection       NST Documentation (Only applicable > 32 weeks):   N/A for fetal gestation

## 2024-07-19 ENCOUNTER — ROUTINE PRENATAL (OUTPATIENT)
Dept: OBSTETRICS AND GYNECOLOGY | Facility: CLINIC | Age: 24
End: 2024-07-19
Payer: COMMERCIAL

## 2024-07-19 VITALS — WEIGHT: 168.6 LBS | BODY MASS INDEX: 30.84 KG/M2 | SYSTOLIC BLOOD PRESSURE: 126 MMHG | DIASTOLIC BLOOD PRESSURE: 70 MMHG

## 2024-07-19 DIAGNOSIS — Z34.92 SECOND TRIMESTER PREGNANCY: Primary | ICD-10-CM

## 2024-07-19 PROCEDURE — 0502F SUBSEQUENT PRENATAL CARE: CPT | Performed by: OBSTETRICS & GYNECOLOGY

## 2024-07-22 NOTE — PROGRESS NOTES
Chief Complaint   Patient presents with    Vaginal Discharge     C/O green discharge        HPI:   , 21w4d gestation reports doing well    ROS:  See Prenatal Episode/Flowsheet  /70   Wt 76.5 kg (168 lb 9.6 oz)   LMP 2024 (Approximate)   BMI 30.84 kg/m²      EXAM:  EXTREMITIES:  No swelling-See Prenatal Episode/Flowsheet    ABDOMEN:  FHTs/Movement noted-See Prenatal Episode/Flowsheet    URINE GLUCOSE/PROTEIN:  See Prenatal Episode/Flowsheet    PELVIC EXAM:  See Prenatal Episode/Flowsheet  CV:  Lungs:  GYN:    MDM:    Lab Results   Component Value Date    HGB 12.9 2024    RUBELLAABIGG 1.77 2024    HEPBSAG Negative 2024    ABO O 2024    RH Positive 2024    ABSCRN Negative 2024    [id number] Non Reactive 2024    HEPCVIRUSABY Non Reactive 2024    URINECX Final report 04/10/2024       U/S:US Ob 14 + Weeks Single or First Gestation (2024 11:24)     1. IUP 21w4d  2. Routine care   3.  Vaginitis: Cultures taken.  Diflucan 150 mg 1 p.o. q. OD x 2 is obvious yeast is noted.

## 2024-08-07 ENCOUNTER — ROUTINE PRENATAL (OUTPATIENT)
Dept: OBSTETRICS AND GYNECOLOGY | Facility: CLINIC | Age: 24
End: 2024-08-07
Payer: COMMERCIAL

## 2024-08-07 VITALS — BODY MASS INDEX: 31.75 KG/M2 | SYSTOLIC BLOOD PRESSURE: 110 MMHG | DIASTOLIC BLOOD PRESSURE: 64 MMHG | WEIGHT: 173.6 LBS

## 2024-08-07 DIAGNOSIS — Z34.93 THIRD TRIMESTER PREGNANCY: Primary | ICD-10-CM

## 2024-08-07 NOTE — PROGRESS NOTES
Chief Complaint   Patient presents with    Routine Prenatal Visit     Prenatal visit with complaints of elevated heart rate.        HPI:   , 23w6d gestation reports doing well    ROS:  See Prenatal Episode/Flowsheet  /64   Wt 78.7 kg (173 lb 9.6 oz)   LMP 2024 (Approximate)   BMI 31.75 kg/m²      EXAM:  EXTREMITIES:  No swelling-See Prenatal Episode/Flowsheet    ABDOMEN:  FHTs/Movement noted-See Prenatal Episode/Flowsheet    URINE GLUCOSE/PROTEIN:  See Prenatal Episode/Flowsheet    PELVIC EXAM:  See Prenatal Episode/Flowsheet  CV:  Lungs:  GYN:    MDM:    Lab Results   Component Value Date    HGB 12.9 2024    RUBELLAABIGG 1.77 2024    HEPBSAG Negative 2024    ABO O 2024    RH Positive 2024    ABSCRN Negative 2024    [id number] Non Reactive 2024    HEPCVIRUSABY Non Reactive 2024    URINECX Final report 04/10/2024       U/S:US Ob 14 + Weeks Single or First Gestation (2024 11:24)     1. IUP 23w6d  2. Routine care   3. Glucola 2-3 weeks  4. Compression, dietary mods

## 2024-08-08 ENCOUNTER — HOSPITAL ENCOUNTER (OUTPATIENT)
Facility: HOSPITAL | Age: 24
Discharge: HOME | End: 2024-08-08
Attending: MIDWIFE | Admitting: MIDWIFE
Payer: COMMERCIAL

## 2024-08-08 VITALS
HEART RATE: 85 BPM | TEMPERATURE: 97.9 F | WEIGHT: 169 LBS | SYSTOLIC BLOOD PRESSURE: 112 MMHG | HEIGHT: 62 IN | BODY MASS INDEX: 31.1 KG/M2 | DIASTOLIC BLOOD PRESSURE: 64 MMHG

## 2024-08-08 LAB
BILIRUB BLD-MCNC: NEGATIVE MG/DL
CLARITY, POC: CLEAR
COLOR UR: YELLOW
GLUCOSE UR STRIP-MCNC: NEGATIVE MG/DL
KETONES UR QL: NEGATIVE
LEUKOCYTE EST, POC: NEGATIVE
NITRITE UR-MCNC: NEGATIVE MG/ML
PH UR: 7 [PH] (ref 5–8)
PROT UR STRIP-MCNC: NEGATIVE MG/DL
RBC # UR STRIP: ABNORMAL /UL
SP GR UR: 1.01 (ref 1–1.03)
UROBILINOGEN UR QL: NORMAL

## 2024-08-08 PROCEDURE — 81002 URINALYSIS NONAUTO W/O SCOPE: CPT | Performed by: MIDWIFE

## 2024-08-08 PROCEDURE — G0463 HOSPITAL OUTPT CLINIC VISIT: HCPCS

## 2024-08-08 RX ORDER — SODIUM CHLORIDE 0.9 % (FLUSH) 0.9 %
10 SYRINGE (ML) INJECTION AS NEEDED
Status: DISCONTINUED | OUTPATIENT
Start: 2024-08-08 | End: 2024-08-08 | Stop reason: HOSPADM

## 2024-08-08 RX ORDER — SODIUM CHLORIDE 9 MG/ML
40 INJECTION, SOLUTION INTRAVENOUS AS NEEDED
Status: DISCONTINUED | OUTPATIENT
Start: 2024-08-08 | End: 2024-08-08 | Stop reason: HOSPADM

## 2024-08-08 RX ORDER — LIDOCAINE HYDROCHLORIDE 10 MG/ML
0.5 INJECTION, SOLUTION INFILTRATION; PERINEURAL ONCE AS NEEDED
Status: DISCONTINUED | OUTPATIENT
Start: 2024-08-08 | End: 2024-08-08 | Stop reason: HOSPADM

## 2024-08-08 RX ORDER — SODIUM CHLORIDE 0.9 % (FLUSH) 0.9 %
10 SYRINGE (ML) INJECTION EVERY 12 HOURS SCHEDULED
Status: DISCONTINUED | OUTPATIENT
Start: 2024-08-08 | End: 2024-08-08 | Stop reason: HOSPADM

## 2024-08-08 NOTE — NON STRESS TEST
"Triage Note - Nursing Documentation  Labor and Delivery Admission Log    Rachel Pereira  : 2000  MRN: [id number]  CSN: [id number]    Date in / Time in:  2024  Time in:     Date out / Time out:    Time out:     Nurse: Kirsty Newman RN    Patient Info: She is a 24 y.o. year old  at 24w0d with an SAMARIA of 2024, by Ultrasound who was seen on the [facility].    Chief Complaint:   Chief Complaint   Patient presents with    Non-stress Test     \"I had some mucous-like discharge with blood streaks in it last night. I'm also having some cramping and meme salinas.\"       Provider Instructions / Disposition: Pt presents complaining of mucous/bloody discharge and cramping. FHT's obtained with doppler. No contractions present upon TOCO, palpation by RN, and pt report. Per provider, pt may be discharged. Pt instructed on round ligament pain, signs/symptoms of labor, and fetal kick counts. Pt verbalized understanding.     Patient Active Problem List   Diagnosis    Asthma    Depression with anxiety    Dysmenorrhea    Vitamin D deficiency    Oral contraceptive pill surveillance    RUQ abdominal pain    Screening examination for STD (sexually transmitted disease)    Female dyspareunia    Dyschezia    Hematochezia    Internal hemorrhoid    Biliary dyskinesia    Bright red rectal bleeding    Candidiasis of vulva and vagina    Acquired hypothyroidism    Class 1 obesity without serious comorbidity with body mass index (BMI) of 31.0 to 31.9 in adult    Influenza A    Upper respiratory tract infection    Urinary tract infection with hematuria    Recurrent urinary tract infection       NST Documentation (Only applicable > 32 weeks):      "

## 2024-09-10 ENCOUNTER — LAB (OUTPATIENT)
Dept: LAB | Facility: HOSPITAL | Age: 24
End: 2024-09-10
Payer: COMMERCIAL

## 2024-09-10 ENCOUNTER — TRANSCRIBE ORDERS (OUTPATIENT)
Dept: LAB | Facility: HOSPITAL | Age: 24
End: 2024-09-10
Payer: COMMERCIAL

## 2024-09-10 DIAGNOSIS — Z3A.28 28 WEEKS GESTATION OF PREGNANCY: ICD-10-CM

## 2024-09-10 DIAGNOSIS — Z3A.28 28 WEEKS GESTATION OF PREGNANCY: Primary | ICD-10-CM

## 2024-09-10 DIAGNOSIS — Z34.83 PRENATAL CARE, SUBSEQUENT PREGNANCY, THIRD TRIMESTER: ICD-10-CM

## 2024-09-10 LAB
BLD GP AB SCN SERPL QL: NEGATIVE
GLUCOSE 1H P 100 G GLC PO SERPL-MCNC: 131 MG/DL (ref 65–139)
TREPONEMA PALLIDUM IGG+IGM AB [PRESENCE] IN SERUM OR PLASMA BY IMMUNOASSAY: NORMAL

## 2024-09-10 PROCEDURE — 82948 REAGENT STRIP/BLOOD GLUCOSE: CPT | Performed by: ADVANCED PRACTICE MIDWIFE

## 2024-09-10 PROCEDURE — 36415 COLL VENOUS BLD VENIPUNCTURE: CPT

## 2024-09-10 PROCEDURE — 85027 COMPLETE CBC AUTOMATED: CPT

## 2024-09-10 PROCEDURE — 86780 TREPONEMA PALLIDUM: CPT

## 2024-09-10 PROCEDURE — 86850 RBC ANTIBODY SCREEN: CPT

## 2024-09-10 PROCEDURE — 82950 GLUCOSE TEST: CPT

## 2024-09-11 LAB
DEPRECATED RDW RBC AUTO: 41.4 FL (ref 37–54)
ERYTHROCYTE [DISTWIDTH] IN BLOOD BY AUTOMATED COUNT: 12.8 % (ref 12.3–15.4)
HCT VFR BLD AUTO: 34.7 % (ref 34–46.6)
HGB BLD-MCNC: 11.6 G/DL (ref 12–15.9)
MCH RBC QN AUTO: 29.4 PG (ref 26.6–33)
MCHC RBC AUTO-ENTMCNC: 33.4 G/DL (ref 31.5–35.7)
MCV RBC AUTO: 87.8 FL (ref 79–97)
PLATELET # BLD AUTO: 281 10*3/MM3 (ref 140–450)
PMV BLD AUTO: 9.4 FL (ref 6–12)
RBC # BLD AUTO: 3.95 10*6/MM3 (ref 3.77–5.28)
WBC NRBC COR # BLD AUTO: 18.74 10*3/MM3 (ref 3.4–10.8)

## 2024-10-10 ENCOUNTER — TRANSCRIBE ORDERS (OUTPATIENT)
Dept: LAB | Facility: HOSPITAL | Age: 24
End: 2024-10-10
Payer: COMMERCIAL

## 2024-10-10 ENCOUNTER — LAB (OUTPATIENT)
Dept: LAB | Facility: HOSPITAL | Age: 24
End: 2024-10-10
Payer: COMMERCIAL

## 2024-10-10 DIAGNOSIS — Z3A.32 32 WEEKS GESTATION OF PREGNANCY: Primary | ICD-10-CM

## 2024-10-10 LAB
DEPRECATED RDW RBC AUTO: 41.7 FL (ref 37–54)
ERYTHROCYTE [DISTWIDTH] IN BLOOD BY AUTOMATED COUNT: 13.3 % (ref 12.3–15.4)
HCT VFR BLD AUTO: 34.7 % (ref 34–46.6)
HGB BLD-MCNC: 11.5 G/DL (ref 12–15.9)
MCH RBC QN AUTO: 28.8 PG (ref 26.6–33)
MCHC RBC AUTO-ENTMCNC: 33.1 G/DL (ref 31.5–35.7)
MCV RBC AUTO: 87 FL (ref 79–97)
PLATELET # BLD AUTO: 275 10*3/MM3 (ref 140–450)
PMV BLD AUTO: 9.6 FL (ref 6–12)
RBC # BLD AUTO: 3.99 10*6/MM3 (ref 3.77–5.28)
TSH SERPL DL<=0.05 MIU/L-ACNC: 1.5 UIU/ML (ref 0.27–4.2)
WBC NRBC COR # BLD AUTO: 16.45 10*3/MM3 (ref 3.4–10.8)

## 2024-10-10 PROCEDURE — 36415 COLL VENOUS BLD VENIPUNCTURE: CPT

## 2024-10-10 PROCEDURE — 85027 COMPLETE CBC AUTOMATED: CPT

## 2024-10-10 PROCEDURE — 84443 ASSAY THYROID STIM HORMONE: CPT

## 2024-10-17 RX ORDER — LEVOTHYROXINE SODIUM 50 UG/1
50 TABLET ORAL DAILY
Qty: 90 TABLET | Refills: 1 | OUTPATIENT
Start: 2024-10-17

## 2024-10-24 ENCOUNTER — OFFICE VISIT (OUTPATIENT)
Dept: INTERNAL MEDICINE | Facility: CLINIC | Age: 24
End: 2024-10-24
Payer: COMMERCIAL

## 2024-10-24 VITALS
TEMPERATURE: 97 F | HEART RATE: 82 BPM | WEIGHT: 192 LBS | DIASTOLIC BLOOD PRESSURE: 80 MMHG | BODY MASS INDEX: 35.33 KG/M2 | SYSTOLIC BLOOD PRESSURE: 120 MMHG | HEIGHT: 62 IN | OXYGEN SATURATION: 98 %

## 2024-10-24 DIAGNOSIS — F41.9 ANXIETY AND DEPRESSION: ICD-10-CM

## 2024-10-24 DIAGNOSIS — Z00.00 WELL ADULT EXAM: Primary | ICD-10-CM

## 2024-10-24 DIAGNOSIS — E03.9 ACQUIRED HYPOTHYROIDISM: ICD-10-CM

## 2024-10-24 DIAGNOSIS — D72.829 LEUKOCYTOSIS, UNSPECIFIED TYPE: ICD-10-CM

## 2024-10-24 DIAGNOSIS — F32.A ANXIETY AND DEPRESSION: ICD-10-CM

## 2024-10-24 PROCEDURE — 99395 PREV VISIT EST AGE 18-39: CPT | Performed by: FAMILY MEDICINE

## 2024-10-24 RX ORDER — LEVOTHYROXINE SODIUM 50 UG/1
50 TABLET ORAL DAILY
Qty: 90 TABLET | Refills: 1 | Status: SHIPPED | OUTPATIENT
Start: 2024-10-24

## 2024-10-24 RX ORDER — BUPROPION HYDROCHLORIDE 150 MG/1
150 TABLET ORAL DAILY
Qty: 90 TABLET | Refills: 3 | Status: SHIPPED | OUTPATIENT
Start: 2024-10-24

## 2024-10-24 RX ORDER — BUSPIRONE HYDROCHLORIDE 5 MG/1
10 TABLET ORAL 2 TIMES DAILY PRN
Qty: 90 TABLET | Refills: 1 | Status: SHIPPED | OUTPATIENT
Start: 2024-10-24

## 2024-10-24 NOTE — PROGRESS NOTES
Rachel Pereira is a 24 y.o. female.    Chief Complaint   Patient presents with    Annual Exam       HPI   History of Present Illness  The patient presents today for an annual physical exam.    She is currently 35 weeks pregnant and has received Tdap, COVID, and flu vaccines at the [facility] last week.  She plans to obtain the RSV vaccine, which is scheduled for next week. Despite the challenges of pregnancy, she attempts to stay active by walking her dogs and follows a healthy diet.     She continues to take Synthroid for hypothyroidism. She reports no chest pain but does experience shortness of breath, which she attributes to her pregnancy. She experiences fatigue associated with pregnancy, but generally has good sleep. She reports feeling hot easily. She occasionally has difficulty initiating urination but does not experience any pain during urination. She reports no unusual body aches or pains, frequent headaches, rashes, or bruises.    She reports no gastrointestinal symptoms such as nausea, vomiting, diarrhea, or constipation. She also reports no respiratory symptoms such as cough, congestion, or runny nose.    She continues to take BuSpar and Wellbutrin, which she finds effective in managing her anxiety and depression. She does not frequently experience feelings of nervousness, worry, sadness, or low mood. She takes BuSpar as needed, typically a few times a month.    She is noted to have leukocytosis persistently over the last several months.     The following portions of the patient's history were reviewed and updated as appropriate: allergies, current medications, past family history, past medical history, past social history, past surgical history and problem list.     Past Medical History:   Diagnosis Date    Anemia     Anxiety     Asthma     exercise induced    Body piercing     both ears, left nare    COVID-19 vaccine series completed     Pfizer x 2    Depression     Disease of thyroid gland      History of stomach ulcers     Hypothyroidism     Peptic ulceration     Seasonal allergies     Tattoos     UTI (urinary tract infection)     Wears glasses        Past Surgical History:   Procedure Laterality Date    CHOLECYSTECTOMY N/A 08/26/2022    Procedure: CHOLECYSTECTOMY LAPAROSCOPIC;  Surgeon: Abbi Reynolds MD;  Location: [facility] OR;  Service: General;  Laterality: N/A;    COLONOSCOPY N/A 11/01/2022    Procedure: COLONOSCOPY WITH BIOPSY;  Surgeon: Abbi Reynolds MD;  Location: [facility] ENDOSCOPY;  Service: Gastroenterology;  Laterality: N/A;    ENDOSCOPY      LAPAROSCOPIC CHOLECYSTECTOMY      WISDOM TOOTH EXTRACTION         Family History   Problem Relation Age of Onset    Hypertension Father     Depression Father     Anxiety disorder Father     Thyroid disease Mother     Depression Mother     Narcolepsy Mother     Anxiety disorder Mother     Arthritis Mother     Heart disease Paternal Grandfather     Mental illness Maternal Grandmother     Heart disease Maternal Grandmother         probable    Diabetes Maternal Grandfather     Colon cancer Maternal Aunt     Arthritis Other     Thyroid disease Other     Diabetes Other     Hypertension Other     Mental illness Other     Heart disease Paternal Grandmother        Social History     Socioeconomic History    Marital status:     Number of children: 0   Tobacco Use    Smoking status: Never     Passive exposure: Yes    Smokeless tobacco: Never    Tobacco comments:     Only smoke every once in a while when drinking   Vaping Use    Vaping status: Never Used   Substance and Sexual Activity    Alcohol use: Yes     Alcohol/week: 1.0 - 2.0 standard drink of alcohol     Types: 1 - 2 Glasses of wine per week    Drug use: Never    Sexual activity: Yes     Partners: Male     Birth control/protection: None       Allergies   Allergen Reactions    Sulfa Antibiotics Rash         Current Outpatient Medications:     buPROPion XL (Wellbutrin XL) 150 MG 24 hr tablet, Take 1  "tablet by mouth Daily., Disp: 90 tablet, Rfl: 3    busPIRone (BUSPAR) 5 MG tablet, Take 2 tablets by mouth 2 (Two) Times a Day As Needed (anxiety)., Disp: 90 tablet, Rfl: 1    levothyroxine (Synthroid) 50 MCG tablet, Take 1 tablet by mouth Daily., Disp: 90 tablet, Rfl: 1    Prenatal Vit-Fe Fumarate-FA (PRENATAL VITAMIN PO), Take  by mouth., Disp: , Rfl:     ROS    Review of Systems   Constitutional:  Positive for fatigue. Negative for chills and fever.   HENT:  Negative for congestion, postnasal drip and sore throat.    Eyes:  Negative for visual disturbance.   Respiratory:  Positive for shortness of breath. Negative for cough.    Cardiovascular:  Negative for chest pain.   Gastrointestinal:  Negative for abdominal pain, constipation, diarrhea, nausea and vomiting.   Endocrine: Positive for heat intolerance. Negative for cold intolerance.   Genitourinary:  Negative for difficulty urinating.   Musculoskeletal:  Negative for arthralgias.   Skin:  Negative for rash.   Allergic/Immunologic: Negative for environmental allergies.   Neurological:  Negative for headache.   Hematological:  Does not bruise/bleed easily.   Psychiatric/Behavioral:  Negative for sleep disturbance and depressed mood. The patient is not nervous/anxious.        Vitals:    10/24/24 0922   BP: 120/80   Pulse: 82   Temp: 97 °F (36.1 °C)   SpO2: 98%   Weight: 87.1 kg (192 lb)   Height: 157.5 cm (62\")   PainSc: 0-No pain     Body mass index is 35.12 kg/m².    Physical Exam     Physical Exam  Constitutional:       General: She is not in acute distress.     Appearance: Normal appearance. She is well-developed.   HENT:      Head: Normocephalic and atraumatic.      Right Ear: Tympanic membrane and external ear normal.      Left Ear: Tympanic membrane and external ear normal.      Mouth/Throat:      Pharynx: No posterior oropharyngeal erythema.   Eyes:      Extraocular Movements: Extraocular movements intact.      Conjunctiva/sclera: Conjunctivae normal.     "  Pupils: Pupils are equal, round, and reactive to light.   Cardiovascular:      Rate and Rhythm: Normal rate and regular rhythm.      Heart sounds: No murmur heard.  Pulmonary:      Effort: Pulmonary effort is normal. No respiratory distress.      Breath sounds: Normal breath sounds. No wheezing.   Abdominal:      General: Bowel sounds are normal. There is no distension.      Palpations: Abdomen is soft.      Tenderness: There is no abdominal tenderness.      Comments: gravid   Musculoskeletal:      Cervical back: Neck supple.      Right lower leg: No edema.      Left lower leg: No edema.   Lymphadenopathy:      Cervical: No cervical adenopathy.   Skin:     General: Skin is warm and dry.   Neurological:      Mental Status: She is alert and oriented to person, place, and time.      Cranial Nerves: No cranial nerve deficit.      Deep Tendon Reflexes: Reflexes normal.   Psychiatric:         Mood and Affect: Mood normal.         Behavior: Behavior normal.             Diagnoses and all orders for this visit:    1. Well adult exam (Primary)    2. Leukocytosis, unspecified type  -     Peripheral Blood Smear    3. Anxiety and depression    4. Acquired hypothyroidism    Other orders  -     buPROPion XL (Wellbutrin XL) 150 MG 24 hr tablet; Take 1 tablet by mouth Daily.  Dispense: 90 tablet; Refill: 3  -     busPIRone (BUSPAR) 5 MG tablet; Take 2 tablets by mouth 2 (Two) Times a Day As Needed (anxiety).  Dispense: 90 tablet; Refill: 1  -     levothyroxine (Synthroid) 50 MCG tablet; Take 1 tablet by mouth Daily.  Dispense: 90 tablet; Refill: 1        Assessment & Plan  1. Well adult exam.  Routine health maintenance, including vaccines, dental/eye health, healthy diet, and exercise, was discussed. Mental health was also addressed.  She is not due for any routine labs at this time.      2. Leukocytosis.  Persistent leukocytosis has been noted over the past few months. A peripheral smear is recommended to be completed  approximately a month after childbirth.    3. Hypothyroidism.  Her condition is stable on the current medication. Laboratory studies conducted by an external provider two weeks ago showed normal TSH levels. The current dosage of Synthroid will be continued. This will be monitored at least annually.    4. Anxiety and depression.  Her condition is stable on the current medication. Continuation of Wellbutrin and BuSpar is advised.    Follow-up  A follow-up visit is scheduled in 6 months.    New Medications Ordered This Visit   Medications    buPROPion XL (Wellbutrin XL) 150 MG 24 hr tablet     Sig: Take 1 tablet by mouth Daily.     Dispense:  90 tablet     Refill:  3    busPIRone (BUSPAR) 5 MG tablet     Sig: Take 2 tablets by mouth 2 (Two) Times a Day As Needed (anxiety).     Dispense:  90 tablet     Refill:  1    levothyroxine (Synthroid) 50 MCG tablet     Sig: Take 1 tablet by mouth Daily.     Dispense:  90 tablet     Refill:  1       No orders of the defined types were placed in this encounter.      Return in about 6 months (around 4/24/2025) for thyroid, anxiety and depression.      Edie Grant,

## 2024-11-07 ENCOUNTER — HOSPITAL ENCOUNTER (OUTPATIENT)
Facility: HOSPITAL | Age: 24
Discharge: HOME | End: 2024-11-07
Attending: SURGERY | Admitting: OBSTETRICS & GYNECOLOGY
Payer: COMMERCIAL

## 2024-11-07 VITALS
DIASTOLIC BLOOD PRESSURE: 81 MMHG | TEMPERATURE: 98.6 F | WEIGHT: 195 LBS | HEART RATE: 98 BPM | RESPIRATION RATE: 18 BRPM | SYSTOLIC BLOOD PRESSURE: 126 MMHG | HEIGHT: 62 IN | BODY MASS INDEX: 35.88 KG/M2

## 2024-11-07 LAB
ABO GROUP BLD: NORMAL
ABO GROUP BLD: NORMAL
ALBUMIN SERPL-MCNC: 3.3 G/DL (ref 3.5–5.2)
ALBUMIN/GLOB SERPL: 1.2 G/DL
ALP SERPL-CCNC: 198 U/L (ref 39–117)
ALT SERPL W P-5'-P-CCNC: 12 U/L (ref 1–33)
ANION GAP SERPL CALCULATED.3IONS-SCNC: 13 MMOL/L (ref 5–15)
AST SERPL-CCNC: 19 U/L (ref 1–32)
BILIRUB SERPL-MCNC: 0.4 MG/DL (ref 0–1.2)
BLD GP AB SCN SERPL QL: NEGATIVE
BUN SERPL-MCNC: 5 MG/DL (ref 6–20)
BUN/CREAT SERPL: 7.6 (ref 7–25)
CALCIUM SPEC-SCNC: 8.6 MG/DL (ref 8.6–10.5)
CHLORIDE SERPL-SCNC: 109 MMOL/L (ref 98–107)
CO2 SERPL-SCNC: 20 MMOL/L (ref 22–29)
CREAT SERPL-MCNC: 0.66 MG/DL (ref 0.57–1)
CREAT UR-MCNC: 50.6 MG/DL
DEPRECATED RDW RBC AUTO: 48.3 FL (ref 37–54)
EGFRCR SERPLBLD CKD-EPI 2021: 126.6 ML/MIN/1.73
ERYTHROCYTE [DISTWIDTH] IN BLOOD BY AUTOMATED COUNT: 15.2 % (ref 12.3–15.4)
EXTERNAL GROUP B STREP ANTIGEN: NEGATIVE
GLOBULIN UR ELPH-MCNC: 2.7 GM/DL
GLUCOSE SERPL-MCNC: 93 MG/DL (ref 65–99)
HCT VFR BLD AUTO: 34.1 % (ref 34–46.6)
HGB BLD-MCNC: 11.3 G/DL (ref 12–15.9)
LDH SERPL-CCNC: 185 U/L (ref 135–214)
MCH RBC QN AUTO: 29.4 PG (ref 26.6–33)
MCHC RBC AUTO-ENTMCNC: 33.1 G/DL (ref 31.5–35.7)
MCV RBC AUTO: 88.8 FL (ref 79–97)
PLATELET # BLD AUTO: 251 10*3/MM3 (ref 140–450)
PMV BLD AUTO: 10.3 FL (ref 6–12)
POTASSIUM SERPL-SCNC: 4.2 MMOL/L (ref 3.5–5.2)
PROT ?TM UR-MCNC: 7.7 MG/DL
PROT SERPL-MCNC: 6 G/DL (ref 6–8.5)
PROT/CREAT UR: 152.2 MG/G CREA (ref 0–200)
RBC # BLD AUTO: 3.84 10*6/MM3 (ref 3.77–5.28)
RH BLD: POSITIVE
RH BLD: POSITIVE
SODIUM SERPL-SCNC: 142 MMOL/L (ref 136–145)
T&S EXPIRATION DATE: NORMAL
TREPONEMA PALLIDUM IGG+IGM AB [PRESENCE] IN SERUM OR PLASMA BY IMMUNOASSAY: NORMAL
URATE SERPL-MCNC: 6.3 MG/DL (ref 2.4–5.7)
WBC NRBC COR # BLD AUTO: 15.92 10*3/MM3 (ref 3.4–10.8)

## 2024-11-07 PROCEDURE — 84550 ASSAY OF BLOOD/URIC ACID: CPT | Performed by: OBSTETRICS & GYNECOLOGY

## 2024-11-07 PROCEDURE — 86901 BLOOD TYPING SEROLOGIC RH(D): CPT | Performed by: OBSTETRICS & GYNECOLOGY

## 2024-11-07 PROCEDURE — 36415 COLL VENOUS BLD VENIPUNCTURE: CPT | Performed by: OBSTETRICS & GYNECOLOGY

## 2024-11-07 PROCEDURE — 86850 RBC ANTIBODY SCREEN: CPT | Performed by: OBSTETRICS & GYNECOLOGY

## 2024-11-07 PROCEDURE — G0463 HOSPITAL OUTPT CLINIC VISIT: HCPCS

## 2024-11-07 PROCEDURE — 86900 BLOOD TYPING SEROLOGIC ABO: CPT

## 2024-11-07 PROCEDURE — 85027 COMPLETE CBC AUTOMATED: CPT | Performed by: OBSTETRICS & GYNECOLOGY

## 2024-11-07 PROCEDURE — 83615 LACTATE (LD) (LDH) ENZYME: CPT | Performed by: OBSTETRICS & GYNECOLOGY

## 2024-11-07 PROCEDURE — 86900 BLOOD TYPING SEROLOGIC ABO: CPT | Performed by: OBSTETRICS & GYNECOLOGY

## 2024-11-07 PROCEDURE — 86780 TREPONEMA PALLIDUM: CPT | Performed by: OBSTETRICS & GYNECOLOGY

## 2024-11-07 PROCEDURE — 84156 ASSAY OF PROTEIN URINE: CPT | Performed by: OBSTETRICS & GYNECOLOGY

## 2024-11-07 PROCEDURE — 86901 BLOOD TYPING SEROLOGIC RH(D): CPT

## 2024-11-07 PROCEDURE — 99213 OFFICE O/P EST LOW 20 MIN: CPT | Performed by: OBSTETRICS & GYNECOLOGY

## 2024-11-07 PROCEDURE — 80053 COMPREHEN METABOLIC PANEL: CPT | Performed by: OBSTETRICS & GYNECOLOGY

## 2024-11-07 PROCEDURE — 82570 ASSAY OF URINE CREATININE: CPT | Performed by: OBSTETRICS & GYNECOLOGY

## 2024-11-07 NOTE — H&P
[facility]  Obstetric History and Physical    Chief Complaint   Patient presents with    Elevated Blood Pressure       Subjective     HPI:    Patient is a 24 y.o. female  currently at 37w0d, who presents with elevated blood pressure in clinic. Patient sent for mild range blood pressure from clinic. She feels well today. No HA, vision changes, RUQ pain, spots in her vision. She also is having some contractions that are every 5-10 minutes though some are painful and some are more crampy in nature.     Her prenatal care is benign.  Her previous obstetric/gynecological history is noted for is non-contributory.    The following portions of the patients history were reviewed and updated as appropriate:   current medications, allergies, past medical history, past surgical history, past family history, past social history and current problem list.     Prenatal Information:  Prenatal Results       Initial Prenatal Labs       Test Value Reference Range Date Time    Hemoglobin  12.9 g/dL 11.1 - 15.9 24 1154    Hematocrit  39.7 % 34.0 - 46.6 24 1154    Platelets  362 x10E3/uL 150 - 450 24 1154    Rubella IgG  1.77 index Immune >0.99 24 1154    Hepatitis B SAg  Negative  Negative 24 1154    Hepatitis C Ab  Non Reactive  Non Reactive 24 1154    RPR  Non Reactive  Non Reactive 24 1154    T. Pallidum Ab   Non-Reactive  Non-Reactive 09/10/24 1635    ABO  O   24 1550    Rh  Positive   24 1550    Antibody Screen  Negative  Negative 24 1154    HIV  Non Reactive  Non Reactive 24 1154    Urine Culture  Final report   04/10/24 0935    Gonorrhea  Negative  Negative 24 1154    Chlamydia  Negative  Negative 24 1154    TSH  1.500 uIU/mL 0.270 - 4.200 10/10/24 1203       2.080 uIU/mL 0.450 - 4.500 24 1154    HgB A1c         Varicella IgG        Hemoglobinopathy Fractionation        Hemoglobinopathy (genetic testing)        Cystic fibrosis                    Fetal testing        Test Value Reference Range Date Time    NIPT        MSAFP        AFP-4                  2nd and 3rd Trimester       Test Value Reference Range Date Time    Hemoglobin (repeated)  11.3 g/dL 12.0 - 15.9 11/07/24 1550       11.5 g/dL 12.0 - 15.9 10/10/24 1203       11.6 g/dL 12.0 - 15.9 09/10/24 1635    Hematocrit (repeated)  34.1 % 34.0 - 46.6 11/07/24 1550       34.7 % 34.0 - 46.6 10/10/24 1203       34.7 % 34.0 - 46.6 09/10/24 1635    Platelets   251 10*3/mm3 140 - 450 11/07/24 1550       275 10*3/mm3 140 - 450 10/10/24 1203       281 10*3/mm3 140 - 450 09/10/24 1635       362 x10E3/uL 150 - 450 05/07/24 1154    1 hour GTT   131 mg/dL 65 - 139 09/10/24 1635    Antibody Screen (repeated)  Negative   11/07/24 1550       Negative   09/10/24 1635    3rd TM syphilis scrn (repeated)  RPR         3rd TM syphilis scrn (repeated) TP-Ab  Non-Reactive  Non-Reactive 09/10/24 1635    3rd TM syphilis screen TB-Ab (FTA)  Non-Reactive  Non-Reactive 09/10/24 1635    Syphilis cascade test TP-Ab (EIA)        Syphilis cascade TPPA        GTT Fasting        GTT 1 Hr        GTT 2 Hr        GTT 3 Hr        Group B Strep                  Other testing        Test Value Reference Range Date Time    Parvo IgG         CMV IgG                   Drug Screening       Test Value Reference Range Date Time    Amphetamine Screen  Negative ng/mL Xlucff=4930 05/07/24 1154    Barbiturate Screen  Negative ng/mL Xpmumd=408 05/07/24 1154    Benzodiazepine Screen  Negative ng/mL Swkmnu=373 05/07/24 1154    Methadone Screen  Negative ng/mL Fqeosr=788 05/07/24 1154    Phencyclidine Screen  Negative ng/mL Cutoff=25 05/07/24 1154    Opiates Screen  Negative ng/mL Bpdcve=521 05/07/24 1154    THC Screen  Negative ng/mL Cutoff=20 05/07/24 1154    Cocaine Screen  Negative ng/mL Haolqh=171 05/07/24 1154    Propoxyphene Screen  Negative ng/mL Fnheox=228 05/07/24 1154    Buprenorphine Screen        Methamphetamine Screen        Oxycodone  Screen        Tricyclic Antidepressants Screen                  Legend    ^: Historical                          External Prenatal Results       Pregnancy Outside Results - Transcribed From Office Records - See Scanned Records For Details       Test Value Date Time    ABO  O  11/07/24 1550    Rh  Positive  11/07/24 1550    Antibody Screen  Negative  11/07/24 1550       Negative  09/10/24 1635       Negative  05/07/24 1154    Varicella IgG       Rubella  1.77 index 05/07/24 1154    Hgb  11.3 g/dL 11/07/24 1550       11.5 g/dL 10/10/24 1203       11.6 g/dL 09/10/24 1635       12.9 g/dL 05/07/24 1154    Hct  34.1 % 11/07/24 1550       34.7 % 10/10/24 1203       34.7 % 09/10/24 1635       39.7 % 05/07/24 1154    HgB A1c        1h GTT  131 mg/dL 09/10/24 1635    3h GTT Fasting       3h GTT 1 hour       3h GTT 2 hour       3h GTT 3 hour        Gonorrhea (discrete)  Negative  05/07/24 1154    Chlamydia (discrete)  Negative  05/07/24 1154    RPR  Non Reactive  05/07/24 1154    Syphils cascade: TP-Ab (FTA)  Non-Reactive  09/10/24 1635    TP-Ab  Non-Reactive  09/10/24 1635    TP-Ab (EIA)       TPPA       HBsAg  Negative  05/07/24 1154    Herpes Simplex Virus PCR       Herpes Simplex VIrus Culture       HIV  Non Reactive  05/07/24 1154    Hep C RNA Quant PCR       Hep C Antibody  Non Reactive  05/07/24 1154    AFP       NIPT       Cystic Fibroisis        Group B Strep       GBS Susceptibility to Clindamycin       GBS Susceptibility to Erythromycin       Fetal Fibronectin       Genetic Testing, Maternal Blood                 Drug Screening       Test Value Date Time    Urine Drug Screen       Amphetamine Screen  Negative ng/mL 05/07/24 1154    Barbiturate Screen  Negative ng/mL 05/07/24 1154    Benzodiazepine Screen  Negative ng/mL 05/07/24 1154    Methadone Screen  Negative ng/mL 05/07/24 1154    Phencyclidine Screen  Negative ng/mL 05/07/24 1154    Opiates Screen       THC Screen       Cocaine Screen       Propoxyphene Screen   Negative ng/mL 24 1154    Buprenorphine Screen       Methamphetamine Screen       Oxycodone Screen       Tricyclic Antidepressants Screen                 Legend    ^: Historical                             Past OB History:     OB History    Para Term  AB Living   1 0 0 0 0 0   SAB IAB Ectopic Molar Multiple Live Births   0 0 0 0 0 0      # Outcome Date GA Lbr Deion/2nd Weight Sex Type Anes PTL Lv   1 Current                Past Medical History: Past Medical History:   Diagnosis Date    Anemia     Anxiety     Asthma     exercise induced    Body piercing     both ears, left nare    COVID-19 vaccine series completed     Pfizer x 2    Depression     Disease of thyroid gland     History of stomach ulcers     Hypothyroidism     Peptic ulceration     Seasonal allergies     Tattoos     UTI (urinary tract infection)     Wears glasses       Past Surgical History Past Surgical History:   Procedure Laterality Date    CHOLECYSTECTOMY N/A 2022    Procedure: CHOLECYSTECTOMY LAPAROSCOPIC;  Surgeon: Abbi Reynolds MD;  Location: [facility] OR;  Service: General;  Laterality: N/A;    COLONOSCOPY N/A 2022    Procedure: COLONOSCOPY WITH BIOPSY;  Surgeon: Abbi Reynolds MD;  Location: [facility] ENDOSCOPY;  Service: Gastroenterology;  Laterality: N/A;    ENDOSCOPY      WISDOM TOOTH EXTRACTION        Family History: Family History   Problem Relation Age of Onset    Hypertension Father     Depression Father     Anxiety disorder Father     Thyroid disease Mother     Depression Mother     Narcolepsy Mother     Anxiety disorder Mother     Arthritis Mother     Heart disease Paternal Grandfather     Mental illness Maternal Grandmother     Heart disease Maternal Grandmother         probable    Diabetes Maternal Grandfather     Colon cancer Maternal Aunt     Arthritis Other     Thyroid disease Other     Diabetes Other     Hypertension Other     Mental illness Other     Heart disease Paternal Grandmother       Social  "History:  reports that she has quit smoking. Her smoking use included electronic cigarette. She has been exposed to tobacco smoke. She has never used smokeless tobacco.   reports that she does not currently use alcohol after a past usage of about 1.0 - 2.0 standard drink of alcohol per week.   reports no history of drug use.        General ROS: Pertinent items are noted in HPI  Home Medications:  Prenatal Vit-Fe Fumarate-FA, buPROPion XL, busPIRone, and levothyroxine    Allergies:  Allergies   Allergen Reactions    Sulfa Antibiotics Rash       Objective       Vital Signs Range for the last 24 hours  Temperature: Temp:  [98.6 °F (37 °C)] 98.6 °F (37 °C)   Temp Source: Temp src: Oral   BP: BP: (117-129)/(72-81) 126/81   Pulse: Heart Rate:  [87-98] 98   Respirations: Resp:  [18] 18   SPO2:       Physical Examination:   General appearance - alert, well appearing, and in no distress  Resp- no increased work of breathing  Abdomen - soft, nontender, nondistended,   Pelvic - normal external genitalia, vulva,   Neurological - alert, normal speech          Cervix: Method: sterile vaginal exam performed   Dilation: Cervical Dilation (cm): 3   Effacement: Cervical Effacement: 80   Station:       FHT: 130s mod variability +accels no decels  [place]:q4-7 minutes    GBS is unknown     Assessment & Plan       * No active hospital problems. *        Assessment:  1.  Intrauterine pregnancy at 37w0d gestation with reactive, reassuring fetal status.    2.  RO pre-e: normal blood pressures here. Asymptomatic. Serum labs with elevated other uric acid.   3. Rule out labor: 3/80/-2 and was unchanged over 1 hour, was 2 cm in the office.  3.  Obstetrical history significant for is non-contributory.  4.  GBS status: No results found for: \"STREPGPB\"    Plan:  1. Discharge to home.  Patient to schedule follow up appointment with primary obgyn at beginning of next week. Strict return precautions reviewed.   2.  Plan of care has been reviewed with " patient and patient agrees.   3.  Risks, benefits of treatment plan have been discussed.  4.  All questions have been answered.        Electronically signed by Ishaan Riddle MD, 11/07/24, 5:55 PM EST.

## 2024-11-15 ENCOUNTER — ANESTHESIA EVENT (OUTPATIENT)
Dept: LABOR AND DELIVERY | Facility: HOSPITAL | Age: 24
End: 2024-11-15
Payer: COMMERCIAL

## 2024-11-15 ENCOUNTER — ANESTHESIA (OUTPATIENT)
Dept: LABOR AND DELIVERY | Facility: HOSPITAL | Age: 24
End: 2024-11-15
Payer: COMMERCIAL

## 2024-11-15 ENCOUNTER — HOSPITAL ENCOUNTER (INPATIENT)
Facility: HOSPITAL | Age: 24
LOS: 3 days | Discharge: HOME | End: 2024-11-18
Attending: ADVANCED PRACTICE MIDWIFE | Admitting: OBSTETRICS & GYNECOLOGY
Payer: COMMERCIAL

## 2024-11-15 PROBLEM — Z37.9 NORMAL LABOR: Status: ACTIVE | Noted: 2024-11-15

## 2024-11-15 LAB
ABO GROUP BLD: NORMAL
ALP SERPL-CCNC: 224 U/L (ref 39–117)
ALT SERPL W P-5'-P-CCNC: 13 U/L (ref 1–33)
AST SERPL-CCNC: 19 U/L (ref 1–32)
BILIRUB SERPL-MCNC: 0.4 MG/DL (ref 0–1.2)
BLD GP AB SCN SERPL QL: NEGATIVE
CREAT SERPL-MCNC: 0.62 MG/DL (ref 0.57–1)
DEPRECATED RDW RBC AUTO: 48.3 FL (ref 37–54)
ERYTHROCYTE [DISTWIDTH] IN BLOOD BY AUTOMATED COUNT: 15.2 % (ref 12.3–15.4)
HCT VFR BLD AUTO: 34.5 % (ref 34–46.6)
HGB BLD-MCNC: 11.6 G/DL (ref 12–15.9)
LDH SERPL-CCNC: 214 U/L (ref 135–214)
MCH RBC QN AUTO: 29.7 PG (ref 26.6–33)
MCHC RBC AUTO-ENTMCNC: 33.6 G/DL (ref 31.5–35.7)
MCV RBC AUTO: 88.2 FL (ref 79–97)
PLATELET # BLD AUTO: 256 10*3/MM3 (ref 140–450)
PMV BLD AUTO: 10 FL (ref 6–12)
POC AMNISURE: POSITIVE
RBC # BLD AUTO: 3.91 10*6/MM3 (ref 3.77–5.28)
RH BLD: POSITIVE
T&S EXPIRATION DATE: NORMAL
URATE SERPL-MCNC: 5.2 MG/DL (ref 2.4–5.7)
WBC NRBC COR # BLD AUTO: 23.77 10*3/MM3 (ref 3.4–10.8)

## 2024-11-15 PROCEDURE — 84450 TRANSFERASE (AST) (SGOT): CPT | Performed by: ADVANCED PRACTICE MIDWIFE

## 2024-11-15 PROCEDURE — 86780 TREPONEMA PALLIDUM: CPT | Performed by: ADVANCED PRACTICE MIDWIFE

## 2024-11-15 PROCEDURE — 59025 FETAL NON-STRESS TEST: CPT

## 2024-11-15 PROCEDURE — 83615 LACTATE (LD) (LDH) ENZYME: CPT | Performed by: ADVANCED PRACTICE MIDWIFE

## 2024-11-15 PROCEDURE — 25010000002 ROPIVACAINE PER 1 MG: Performed by: ANESTHESIOLOGY

## 2024-11-15 PROCEDURE — 82247 BILIRUBIN TOTAL: CPT | Performed by: ADVANCED PRACTICE MIDWIFE

## 2024-11-15 PROCEDURE — 85027 COMPLETE CBC AUTOMATED: CPT | Performed by: ADVANCED PRACTICE MIDWIFE

## 2024-11-15 PROCEDURE — 86900 BLOOD TYPING SEROLOGIC ABO: CPT | Performed by: ADVANCED PRACTICE MIDWIFE

## 2024-11-15 PROCEDURE — 84550 ASSAY OF BLOOD/URIC ACID: CPT | Performed by: ADVANCED PRACTICE MIDWIFE

## 2024-11-15 PROCEDURE — 36415 COLL VENOUS BLD VENIPUNCTURE: CPT | Performed by: ADVANCED PRACTICE MIDWIFE

## 2024-11-15 PROCEDURE — 82565 ASSAY OF CREATININE: CPT | Performed by: ADVANCED PRACTICE MIDWIFE

## 2024-11-15 PROCEDURE — C1755 CATHETER, INTRASPINAL: HCPCS | Performed by: ANESTHESIOLOGY

## 2024-11-15 PROCEDURE — 25810000003 LACTATED RINGERS SOLUTION: Performed by: OBSTETRICS & GYNECOLOGY

## 2024-11-15 PROCEDURE — 25010000002 FENTANYL CITRATE (PF) 50 MCG/ML SOLUTION: Performed by: ANESTHESIOLOGY

## 2024-11-15 PROCEDURE — 84075 ASSAY ALKALINE PHOSPHATASE: CPT | Performed by: ADVANCED PRACTICE MIDWIFE

## 2024-11-15 PROCEDURE — 25010000002 LIDOCAINE-EPINEPHRINE (PF) 1.5 %-1:200000 SOLUTION: Performed by: ANESTHESIOLOGY

## 2024-11-15 PROCEDURE — 86850 RBC ANTIBODY SCREEN: CPT | Performed by: ADVANCED PRACTICE MIDWIFE

## 2024-11-15 PROCEDURE — 25810000003 LACTATED RINGERS PER 1000 ML: Performed by: OBSTETRICS & GYNECOLOGY

## 2024-11-15 PROCEDURE — 25010000002 BUPIVACAINE (PF) 0.5 % SOLUTION: Performed by: ANESTHESIOLOGY

## 2024-11-15 PROCEDURE — 84460 ALANINE AMINO (ALT) (SGPT): CPT | Performed by: ADVANCED PRACTICE MIDWIFE

## 2024-11-15 PROCEDURE — 86901 BLOOD TYPING SEROLOGIC RH(D): CPT | Performed by: ADVANCED PRACTICE MIDWIFE

## 2024-11-15 PROCEDURE — 25810000003 LACTATED RINGERS SOLUTION: Performed by: ANESTHESIOLOGY

## 2024-11-15 PROCEDURE — 84112 EVAL AMNIOTIC FLUID PROTEIN: CPT | Performed by: OBSTETRICS & GYNECOLOGY

## 2024-11-15 RX ORDER — NALOXONE HCL 0.4 MG/ML
0.4 VIAL (ML) INJECTION
Status: DISCONTINUED | OUTPATIENT
Start: 2024-11-15 | End: 2024-11-16

## 2024-11-15 RX ORDER — ACETAMINOPHEN 325 MG/1
650 TABLET ORAL EVERY 4 HOURS PRN
Status: DISCONTINUED | OUTPATIENT
Start: 2024-11-15 | End: 2024-11-16

## 2024-11-15 RX ORDER — DIPHENHYDRAMINE HYDROCHLORIDE 50 MG/ML
12.5 INJECTION INTRAMUSCULAR; INTRAVENOUS EVERY 8 HOURS PRN
Status: DISCONTINUED | OUTPATIENT
Start: 2024-11-15 | End: 2024-11-16

## 2024-11-15 RX ORDER — ONDANSETRON 2 MG/ML
4 INJECTION INTRAMUSCULAR; INTRAVENOUS ONCE AS NEEDED
Status: DISCONTINUED | OUTPATIENT
Start: 2024-11-15 | End: 2024-11-16

## 2024-11-15 RX ORDER — FENTANYL CITRATE 50 UG/ML
100 INJECTION, SOLUTION INTRAMUSCULAR; INTRAVENOUS ONCE
Status: DISCONTINUED | OUTPATIENT
Start: 2024-11-15 | End: 2024-11-18 | Stop reason: HOSPADM

## 2024-11-15 RX ORDER — OXYCODONE AND ACETAMINOPHEN 10; 325 MG/1; MG/1
2 TABLET ORAL EVERY 4 HOURS PRN
Status: DISCONTINUED | OUTPATIENT
Start: 2024-11-15 | End: 2024-11-16

## 2024-11-15 RX ORDER — METOCLOPRAMIDE HYDROCHLORIDE 5 MG/ML
10 INJECTION INTRAMUSCULAR; INTRAVENOUS ONCE AS NEEDED
Status: DISCONTINUED | OUTPATIENT
Start: 2024-11-15 | End: 2024-11-16

## 2024-11-15 RX ORDER — MISOPROSTOL 200 UG/1
800 TABLET ORAL AS NEEDED
Status: DISCONTINUED | OUTPATIENT
Start: 2024-11-15 | End: 2024-11-16

## 2024-11-15 RX ORDER — FENTANYL CITRATE 50 UG/ML
INJECTION, SOLUTION INTRAMUSCULAR; INTRAVENOUS AS NEEDED
Status: DISCONTINUED | OUTPATIENT
Start: 2024-11-15 | End: 2024-11-16 | Stop reason: SURG

## 2024-11-15 RX ORDER — ONDANSETRON 2 MG/ML
4 INJECTION INTRAMUSCULAR; INTRAVENOUS EVERY 6 HOURS PRN
Status: DISCONTINUED | OUTPATIENT
Start: 2024-11-15 | End: 2024-11-16

## 2024-11-15 RX ORDER — ACETAMINOPHEN 325 MG/1
650 TABLET ORAL EVERY 4 HOURS PRN
Status: DISCONTINUED | OUTPATIENT
Start: 2024-11-15 | End: 2024-11-15 | Stop reason: SDUPTHER

## 2024-11-15 RX ORDER — IBUPROFEN 600 MG/1
600 TABLET, FILM COATED ORAL EVERY 6 HOURS PRN
Status: DISCONTINUED | OUTPATIENT
Start: 2024-11-15 | End: 2024-11-16

## 2024-11-15 RX ORDER — METHYLERGONOVINE MALEATE 0.2 MG/ML
200 INJECTION INTRAVENOUS ONCE AS NEEDED
Status: COMPLETED | OUTPATIENT
Start: 2024-11-15 | End: 2024-11-16

## 2024-11-15 RX ORDER — CARBOPROST TROMETHAMINE 250 UG/ML
250 INJECTION, SOLUTION INTRAMUSCULAR AS NEEDED
Status: DISCONTINUED | OUTPATIENT
Start: 2024-11-15 | End: 2024-11-16

## 2024-11-15 RX ORDER — MORPHINE SULFATE 2 MG/ML
2 INJECTION, SOLUTION INTRAMUSCULAR; INTRAVENOUS EVERY 4 HOURS PRN
Status: DISCONTINUED | OUTPATIENT
Start: 2024-11-15 | End: 2024-11-16

## 2024-11-15 RX ORDER — OXYTOCIN/0.9 % SODIUM CHLORIDE 30/500 ML
999 PLASTIC BAG, INJECTION (ML) INTRAVENOUS ONCE
Status: DISCONTINUED | OUTPATIENT
Start: 2024-11-16 | End: 2024-11-16 | Stop reason: HOSPADM

## 2024-11-15 RX ORDER — LIDOCAINE HYDROCHLORIDE 10 MG/ML
0.5 INJECTION, SOLUTION EPIDURAL; INFILTRATION; INTRACAUDAL; PERINEURAL ONCE AS NEEDED
Status: DISCONTINUED | OUTPATIENT
Start: 2024-11-15 | End: 2024-11-16

## 2024-11-15 RX ORDER — SODIUM CHLORIDE, SODIUM LACTATE, POTASSIUM CHLORIDE, CALCIUM CHLORIDE 600; 310; 30; 20 MG/100ML; MG/100ML; MG/100ML; MG/100ML
125 INJECTION, SOLUTION INTRAVENOUS CONTINUOUS
Status: DISCONTINUED | OUTPATIENT
Start: 2024-11-15 | End: 2024-11-16

## 2024-11-15 RX ORDER — SODIUM CHLORIDE 0.9 % (FLUSH) 0.9 %
10 SYRINGE (ML) INJECTION AS NEEDED
Status: DISCONTINUED | OUTPATIENT
Start: 2024-11-15 | End: 2024-11-16

## 2024-11-15 RX ORDER — MAGNESIUM CARB/ALUMINUM HYDROX 105-160MG
30 TABLET,CHEWABLE ORAL ONCE
Status: DISCONTINUED | OUTPATIENT
Start: 2024-11-15 | End: 2024-11-16

## 2024-11-15 RX ORDER — ONDANSETRON 4 MG/1
4 TABLET, ORALLY DISINTEGRATING ORAL EVERY 6 HOURS PRN
Status: DISCONTINUED | OUTPATIENT
Start: 2024-11-15 | End: 2024-11-15 | Stop reason: SDUPTHER

## 2024-11-15 RX ORDER — LIDOCAINE HYDROCHLORIDE AND EPINEPHRINE 15; 5 MG/ML; UG/ML
INJECTION, SOLUTION EPIDURAL AS NEEDED
Status: DISCONTINUED | OUTPATIENT
Start: 2024-11-15 | End: 2024-11-16 | Stop reason: SURG

## 2024-11-15 RX ORDER — ONDANSETRON 4 MG/1
4 TABLET, ORALLY DISINTEGRATING ORAL EVERY 6 HOURS PRN
Status: DISCONTINUED | OUTPATIENT
Start: 2024-11-15 | End: 2024-11-16

## 2024-11-15 RX ORDER — SODIUM CHLORIDE 9 MG/ML
40 INJECTION, SOLUTION INTRAVENOUS AS NEEDED
Status: DISCONTINUED | OUTPATIENT
Start: 2024-11-15 | End: 2024-11-16

## 2024-11-15 RX ORDER — CITRIC ACID/SODIUM CITRATE 334-500MG
30 SOLUTION, ORAL ORAL ONCE
Status: DISCONTINUED | OUTPATIENT
Start: 2024-11-15 | End: 2024-11-16

## 2024-11-15 RX ORDER — ONDANSETRON 2 MG/ML
4 INJECTION INTRAMUSCULAR; INTRAVENOUS EVERY 6 HOURS PRN
Status: DISCONTINUED | OUTPATIENT
Start: 2024-11-15 | End: 2024-11-15 | Stop reason: SDUPTHER

## 2024-11-15 RX ORDER — SODIUM CHLORIDE 0.9 % (FLUSH) 0.9 %
10 SYRINGE (ML) INJECTION EVERY 12 HOURS SCHEDULED
Status: DISCONTINUED | OUTPATIENT
Start: 2024-11-15 | End: 2024-11-16

## 2024-11-15 RX ORDER — TERBUTALINE SULFATE 1 MG/ML
0.25 INJECTION, SOLUTION SUBCUTANEOUS AS NEEDED
Status: DISCONTINUED | OUTPATIENT
Start: 2024-11-15 | End: 2024-11-16

## 2024-11-15 RX ORDER — EPHEDRINE SULFATE 5 MG/ML
10 INJECTION INTRAVENOUS
Status: DISCONTINUED | OUTPATIENT
Start: 2024-11-15 | End: 2024-11-16

## 2024-11-15 RX ORDER — BUPIVACAINE HYDROCHLORIDE 5 MG/ML
INJECTION, SOLUTION EPIDURAL; INTRACAUDAL AS NEEDED
Status: DISCONTINUED | OUTPATIENT
Start: 2024-11-15 | End: 2024-11-16 | Stop reason: SURG

## 2024-11-15 RX ORDER — FAMOTIDINE 10 MG/ML
20 INJECTION, SOLUTION INTRAVENOUS ONCE AS NEEDED
Status: COMPLETED | OUTPATIENT
Start: 2024-11-15 | End: 2024-11-16

## 2024-11-15 RX ORDER — OXYTOCIN/0.9 % SODIUM CHLORIDE 30/500 ML
250 PLASTIC BAG, INJECTION (ML) INTRAVENOUS CONTINUOUS
Status: ACTIVE | OUTPATIENT
Start: 2024-11-16 | End: 2024-11-16

## 2024-11-15 RX ORDER — ROPIVACAINE HYDROCHLORIDE 2 MG/ML
15 INJECTION, SOLUTION EPIDURAL; INFILTRATION; PERINEURAL CONTINUOUS
Status: DISCONTINUED | OUTPATIENT
Start: 2024-11-15 | End: 2024-11-18 | Stop reason: HOSPADM

## 2024-11-15 RX ADMIN — LIDOCAINE HYDROCHLORIDE AND EPINEPHRINE 3 ML: 15; 5 INJECTION, SOLUTION EPIDURAL at 22:38

## 2024-11-15 RX ADMIN — ROPIVACAINE HYDROCHLORIDE 14 ML/HR: 2 INJECTION, SOLUTION EPIDURAL; INFILTRATION at 22:38

## 2024-11-15 RX ADMIN — SODIUM CHLORIDE, POTASSIUM CHLORIDE, SODIUM LACTATE AND CALCIUM CHLORIDE 500 ML: 600; 310; 30; 20 INJECTION, SOLUTION INTRAVENOUS at 23:31

## 2024-11-15 RX ADMIN — SODIUM CHLORIDE, POTASSIUM CHLORIDE, SODIUM LACTATE AND CALCIUM CHLORIDE 1000 ML: 600; 310; 30; 20 INJECTION, SOLUTION INTRAVENOUS at 22:00

## 2024-11-15 RX ADMIN — BUPIVACAINE HYDROCHLORIDE 5 ML: 5 INJECTION, SOLUTION EPIDURAL; INTRACAUDAL at 22:38

## 2024-11-15 RX ADMIN — FENTANYL CITRATE 100 MCG: 50 INJECTION, SOLUTION INTRAMUSCULAR; INTRAVENOUS at 22:38

## 2024-11-15 RX ADMIN — SODIUM CHLORIDE, POTASSIUM CHLORIDE, SODIUM LACTATE AND CALCIUM CHLORIDE 125 ML/HR: 600; 310; 30; 20 INJECTION, SOLUTION INTRAVENOUS at 23:19

## 2024-11-16 PROBLEM — R31.9 URINARY TRACT INFECTION WITH HEMATURIA: Status: RESOLVED | Noted: 2024-01-11 | Resolved: 2024-11-16

## 2024-11-16 PROBLEM — Z37.9 NORMAL LABOR: Status: RESOLVED | Noted: 2024-11-15 | Resolved: 2024-11-16

## 2024-11-16 PROBLEM — N39.0 URINARY TRACT INFECTION WITH HEMATURIA: Status: RESOLVED | Noted: 2024-01-11 | Resolved: 2024-11-16

## 2024-11-16 LAB — TREPONEMA PALLIDUM IGG+IGM AB [PRESENCE] IN SERUM OR PLASMA BY IMMUNOASSAY: NORMAL

## 2024-11-16 PROCEDURE — 25010000002 ONDANSETRON PER 1 MG: Performed by: OBSTETRICS & GYNECOLOGY

## 2024-11-16 PROCEDURE — 51702 INSERT TEMP BLADDER CATH: CPT

## 2024-11-16 PROCEDURE — 51703 INSERT BLADDER CATH COMPLEX: CPT

## 2024-11-16 PROCEDURE — C1755 CATHETER, INTRASPINAL: HCPCS

## 2024-11-16 PROCEDURE — 25010000002 METHYLERGONOVINE MALEATE PER 0.2 MG: Performed by: OBSTETRICS & GYNECOLOGY

## 2024-11-16 PROCEDURE — 25810000003 LACTATED RINGERS PER 1000 ML: Performed by: ADVANCED PRACTICE MIDWIFE

## 2024-11-16 PROCEDURE — 25010000002 ONDANSETRON PER 1 MG: Performed by: ADVANCED PRACTICE MIDWIFE

## 2024-11-16 PROCEDURE — 0HQ9XZZ REPAIR PERINEUM SKIN, EXTERNAL APPROACH: ICD-10-PCS | Performed by: ADVANCED PRACTICE MIDWIFE

## 2024-11-16 RX ORDER — BUSPIRONE HYDROCHLORIDE 10 MG/1
10 TABLET ORAL 2 TIMES DAILY PRN
Status: DISCONTINUED | OUTPATIENT
Start: 2024-11-16 | End: 2024-11-18 | Stop reason: HOSPADM

## 2024-11-16 RX ORDER — OXYTOCIN/0.9 % SODIUM CHLORIDE 30/500 ML
2-20 PLASTIC BAG, INJECTION (ML) INTRAVENOUS
Status: DISCONTINUED | OUTPATIENT
Start: 2024-11-16 | End: 2024-11-18 | Stop reason: HOSPADM

## 2024-11-16 RX ORDER — BISACODYL 10 MG
10 SUPPOSITORY, RECTAL RECTAL DAILY PRN
Status: DISCONTINUED | OUTPATIENT
Start: 2024-11-17 | End: 2024-11-18 | Stop reason: HOSPADM

## 2024-11-16 RX ORDER — SODIUM CHLORIDE, SODIUM LACTATE, POTASSIUM CHLORIDE, CALCIUM CHLORIDE 600; 310; 30; 20 MG/100ML; MG/100ML; MG/100ML; MG/100ML
150 INJECTION, SOLUTION INTRAVENOUS CONTINUOUS
Status: ACTIVE | OUTPATIENT
Start: 2024-11-16 | End: 2024-11-17

## 2024-11-16 RX ORDER — LEVOTHYROXINE SODIUM 100 UG/1
50 TABLET ORAL DAILY
Status: DISCONTINUED | OUTPATIENT
Start: 2024-11-16 | End: 2024-11-18 | Stop reason: HOSPADM

## 2024-11-16 RX ORDER — DOCUSATE SODIUM 100 MG/1
100 CAPSULE, LIQUID FILLED ORAL 2 TIMES DAILY
Status: DISCONTINUED | OUTPATIENT
Start: 2024-11-16 | End: 2024-11-18 | Stop reason: HOSPADM

## 2024-11-16 RX ORDER — HYDROCORTISONE 25 MG/G
1 CREAM TOPICAL AS NEEDED
Status: DISCONTINUED | OUTPATIENT
Start: 2024-11-16 | End: 2024-11-18 | Stop reason: HOSPADM

## 2024-11-16 RX ORDER — HYDROCODONE BITARTRATE AND ACETAMINOPHEN 5; 325 MG/1; MG/1
1 TABLET ORAL EVERY 4 HOURS PRN
Status: DISCONTINUED | OUTPATIENT
Start: 2024-11-16 | End: 2024-11-18 | Stop reason: HOSPADM

## 2024-11-16 RX ORDER — BUPROPION HYDROCHLORIDE 150 MG/1
150 TABLET ORAL DAILY
Status: DISCONTINUED | OUTPATIENT
Start: 2024-11-16 | End: 2024-11-18 | Stop reason: HOSPADM

## 2024-11-16 RX ORDER — IBUPROFEN 600 MG/1
600 TABLET, FILM COATED ORAL EVERY 6 HOURS PRN
Status: DISCONTINUED | OUTPATIENT
Start: 2024-11-16 | End: 2024-11-18 | Stop reason: HOSPADM

## 2024-11-16 RX ORDER — CALCIUM CARBONATE 500 MG/1
2 TABLET, CHEWABLE ORAL ONCE AS NEEDED
Status: COMPLETED | OUTPATIENT
Start: 2024-11-16 | End: 2024-11-16

## 2024-11-16 RX ORDER — ACETAMINOPHEN 325 MG/1
650 TABLET ORAL EVERY 6 HOURS PRN
Status: DISCONTINUED | OUTPATIENT
Start: 2024-11-16 | End: 2024-11-18 | Stop reason: HOSPADM

## 2024-11-16 RX ORDER — OXYTOCIN/0.9 % SODIUM CHLORIDE 30/500 ML
125 PLASTIC BAG, INJECTION (ML) INTRAVENOUS ONCE AS NEEDED
Status: DISCONTINUED | OUTPATIENT
Start: 2024-11-16 | End: 2024-11-18 | Stop reason: HOSPADM

## 2024-11-16 RX ORDER — SODIUM CHLORIDE 0.9 % (FLUSH) 0.9 %
1-10 SYRINGE (ML) INJECTION AS NEEDED
Status: DISCONTINUED | OUTPATIENT
Start: 2024-11-16 | End: 2024-11-18 | Stop reason: HOSPADM

## 2024-11-16 RX ORDER — HYDROCODONE BITARTRATE AND ACETAMINOPHEN 10; 325 MG/1; MG/1
1 TABLET ORAL EVERY 4 HOURS PRN
Status: DISCONTINUED | OUTPATIENT
Start: 2024-11-16 | End: 2024-11-18 | Stop reason: HOSPADM

## 2024-11-16 RX ORDER — ONDANSETRON 2 MG/ML
4 INJECTION INTRAMUSCULAR; INTRAVENOUS EVERY 6 HOURS PRN
Status: DISCONTINUED | OUTPATIENT
Start: 2024-11-16 | End: 2024-11-18 | Stop reason: HOSPADM

## 2024-11-16 RX ORDER — SODIUM CHLORIDE, SODIUM LACTATE, POTASSIUM CHLORIDE, CALCIUM CHLORIDE 600; 310; 30; 20 MG/100ML; MG/100ML; MG/100ML; MG/100ML
150 INJECTION, SOLUTION INTRAVENOUS CONTINUOUS
Status: DISCONTINUED | OUTPATIENT
Start: 2024-11-16 | End: 2024-11-16

## 2024-11-16 RX ADMIN — Medication: at 09:01

## 2024-11-16 RX ADMIN — DOCUSATE SODIUM 100 MG: 100 CAPSULE, LIQUID FILLED ORAL at 09:02

## 2024-11-16 RX ADMIN — LEVOTHYROXINE SODIUM 50 MCG: 0.1 TABLET ORAL at 09:02

## 2024-11-16 RX ADMIN — Medication 2 MILLI-UNITS/MIN: at 04:22

## 2024-11-16 RX ADMIN — Medication 1 APPLICATION: at 09:02

## 2024-11-16 RX ADMIN — IBUPROFEN 600 MG: 600 TABLET, FILM COATED ORAL at 07:42

## 2024-11-16 RX ADMIN — FAMOTIDINE 20 MG: 10 INJECTION, SOLUTION INTRAVENOUS at 00:28

## 2024-11-16 RX ADMIN — DOCUSATE SODIUM 100 MG: 100 CAPSULE, LIQUID FILLED ORAL at 20:29

## 2024-11-16 RX ADMIN — CALCIUM CARBONATE 2 TABLET: 500 TABLET, CHEWABLE ORAL at 02:06

## 2024-11-16 RX ADMIN — BUPROPION HYDROCHLORIDE 150 MG: 150 TABLET, EXTENDED RELEASE ORAL at 09:02

## 2024-11-16 RX ADMIN — METHYLERGONOVINE MALEATE 200 MCG: 0.2 INJECTION INTRAVENOUS at 06:49

## 2024-11-16 RX ADMIN — SODIUM CHLORIDE, POTASSIUM CHLORIDE, SODIUM LACTATE AND CALCIUM CHLORIDE 150 ML/HR: 600; 310; 30; 20 INJECTION, SOLUTION INTRAVENOUS at 03:26

## 2024-11-16 RX ADMIN — ONDANSETRON 4 MG: 2 INJECTION INTRAMUSCULAR; INTRAVENOUS at 09:02

## 2024-11-16 RX ADMIN — ONDANSETRON 4 MG: 2 INJECTION INTRAMUSCULAR; INTRAVENOUS at 02:30

## 2024-11-16 RX ADMIN — WITCH HAZEL: 500 SOLUTION RECTAL; TOPICAL at 09:02

## 2024-11-16 RX ADMIN — IBUPROFEN 600 MG: 600 TABLET, FILM COATED ORAL at 23:43

## 2024-11-16 RX ADMIN — IBUPROFEN 600 MG: 600 TABLET, FILM COATED ORAL at 16:47

## 2024-11-16 NOTE — H&P
[facility]  Obstetric History and Physical    Chief Complaint   Patient presents with    Leaking Fluid       Subjective     Patient is a 24 y.o. female  currently at 38w1d, who presents for term labor  with ROM. On intake reports contractions, reports leakage of fluid, denies  vaginal bleeding. reports fetal movement.    Her prenatal care is benign.  Her previous obstetric/gynecological history is noted for is non-contributory.    The following portions of the patients history were reviewed and updated as appropriate: past medical history, past surgical history, past family history, and past social history .       Prenatal Information:  Prenatal Results       Initial Prenatal Labs       Test Value Reference Range Date Time    Hemoglobin  12.9 g/dL 11.1 - 15.9 24 1154    Hematocrit  39.7 % 34.0 - 46.6 24 1154    Platelets  362 x10E3/uL 150 - 450 24 1154    Rubella IgG  1.77 index Immune >0.99 24 1154    Hepatitis B SAg  Negative  Negative 24 1154    Hepatitis C Ab  Non Reactive  Non Reactive 24 1154    RPR  Non Reactive  Non Reactive 24 1154    T. Pallidum Ab   Non-Reactive  Non-Reactive 24 1550       Non-Reactive  Non-Reactive 09/10/24 1635    ABO  O   24 1659    Rh  Positive   24 1659    Antibody Screen  Negative  Negative 24 1154    HIV  Non Reactive  Non Reactive 24 1154    Urine Culture  Final report   04/10/24 0935    Gonorrhea  Negative  Negative 24 1154    Chlamydia  Negative  Negative 24 1154    TSH  1.500 uIU/mL 0.270 - 4.200 10/10/24 1203       2.080 uIU/mL 0.450 - 4.500 24 1154    HgB A1c         Varicella IgG        Hemoglobinopathy Fractionation        Hemoglobinopathy (genetic testing)        Cystic fibrosis         Spinal muscular atrophy        Fragile X                  Fetal testing        Test Value Reference Range Date Time    NIPT        MSAFP        AFP-4                  2nd and 3rd Trimester        Test Value Reference Range Date Time    Hemoglobin (repeated)  11.6 g/dL 12.0 - 15.9 11/15/24 2054       11.3 g/dL 12.0 - 15.9 11/07/24 1550       11.5 g/dL 12.0 - 15.9 10/10/24 1203       11.6 g/dL 12.0 - 15.9 09/10/24 1635    Hematocrit (repeated)  34.5 % 34.0 - 46.6 11/15/24 2054       34.1 % 34.0 - 46.6 11/07/24 1550       34.7 % 34.0 - 46.6 10/10/24 1203       34.7 % 34.0 - 46.6 09/10/24 1635    Platelets   256 10*3/mm3 140 - 450 11/15/24 2054       251 10*3/mm3 140 - 450 11/07/24 1550       275 10*3/mm3 140 - 450 10/10/24 1203       281 10*3/mm3 140 - 450 09/10/24 1635       362 x10E3/uL 150 - 450 05/07/24 1154    1 hour GTT   131 mg/dL 65 - 139 09/10/24 1635    Antibody Screen (repeated)  Negative   11/07/24 1550       Negative   09/10/24 1635    3rd TM syphilis scrn (repeated)  RPR         3rd TM syphilis scrn (repeated) TP-Ab  Non-Reactive  Non-Reactive 11/07/24 1550       Non-Reactive  Non-Reactive 09/10/24 1635    3rd TM syphilis screen TB-Ab (FTA)  Non-Reactive  Non-Reactive 11/07/24 1550       Non-Reactive  Non-Reactive 09/10/24 1635    Syphilis cascade test TP-Ab (EIA)        Syphilis cascade TPPA        GTT Fasting        GTT 1 Hr        GTT 2 Hr        GTT 3 Hr        Group B Strep                  Other testing        Test Value Reference Range Date Time    Parvo IgG         CMV IgG                   Drug Screening       Test Value Reference Range Date Time    Amphetamine Screen  Negative ng/mL Mbhkbp=2811 05/07/24 1154    Barbiturate Screen  Negative ng/mL Xlucfh=475 05/07/24 1154    Benzodiazepine Screen  Negative ng/mL Wfwwzu=538 05/07/24 1154    Methadone Screen  Negative ng/mL Mbhalq=914 05/07/24 1154    Phencyclidine Screen  Negative ng/mL Cutoff=25 05/07/24 1154    Opiates Screen  Negative ng/mL Zasxgf=028 05/07/24 1154    THC Screen  Negative ng/mL Cutoff=20 05/07/24 1154    Cocaine Screen  Negative ng/mL Bwqerj=223 05/07/24 1154    Propoxyphene Screen  Negative ng/mL Ofczpk=652  24 1154    Buprenorphine Screen        Methamphetamine Screen        Oxycodone Screen        Tricyclic Antidepressants Screen                  Legend    ^: Historical                               Past OB History:       OB History    Para Term  AB Living   1 0 0 0 0 0   SAB IAB Ectopic Molar Multiple Live Births   0 0 0 0 0 0      # Outcome Date GA Lbr Deion/2nd Weight Sex Type Anes PTL Lv   1 Current                Past Medical History: Past Medical History:   Diagnosis Date    Anemia     Anxiety     Asthma     exercise induced    Body piercing     both ears, left nare    COVID-19 vaccine series completed     Pfizer x 2    Depression     Disease of thyroid gland     History of stomach ulcers     Hypothyroidism     Peptic ulceration     Seasonal allergies     Tattoos     UTI (urinary tract infection)     Wears glasses       Past Surgical History Past Surgical History:   Procedure Laterality Date    CHOLECYSTECTOMY N/A 2022    Procedure: CHOLECYSTECTOMY LAPAROSCOPIC;  Surgeon: Abbi Reynolds MD;  Location: [facility] OR;  Service: General;  Laterality: N/A;    COLONOSCOPY N/A 2022    Procedure: COLONOSCOPY WITH BIOPSY;  Surgeon: Abbi Reynolds MD;  Location: [facility] ENDOSCOPY;  Service: Gastroenterology;  Laterality: N/A;    ENDOSCOPY      WISDOM TOOTH EXTRACTION        Family History: Family History   Problem Relation Age of Onset    Hypertension Father     Depression Father     Anxiety disorder Father     Thyroid disease Mother     Depression Mother     Narcolepsy Mother     Anxiety disorder Mother     Arthritis Mother     Heart disease Paternal Grandfather     Mental illness Maternal Grandmother     Heart disease Maternal Grandmother         probable    Diabetes Maternal Grandfather     Colon cancer Maternal Aunt     Arthritis Other     Thyroid disease Other     Diabetes Other     Hypertension Other     Mental illness Other     Heart disease Paternal Grandmother       Social History:   reports that she has quit smoking. Her smoking use included electronic cigarette. She has been exposed to tobacco smoke. She has never used smokeless tobacco.   reports that she does not currently use alcohol after a past usage of about 1.0 - 2.0 standard drink of alcohol per week.   reports no history of drug use.        REVIEW OF SYSTEMS              Reports fetal movement is normal             Reports leakage of amniotic fluid.             Denies vaginal bleeding             She reports No contractions, Regular contractions, frequency: Every 3-4 minutes, intensity moderate             All other systems reviewed and are negative    Objective       Vital Signs Range for the last 24 hours  Temperature: Temp:  [99.3 °F (37.4 °C)] 99.3 °F (37.4 °C)   Temp Source: Temp src: Oral   BP: BP: (115)/(72) 115/72   Pulse: Heart Rate:  [97] 97   Respirations: Resp:  [18] 18   SPO2: SpO2:  [98 %] 98 %   O2 Amount (l/min):     O2 Devices Device (Oxygen Therapy): room air   Weight: Weight:  [89.4 kg (197 lb)] 89.4 kg (197 lb)       Presentation: vertex   Cervix: Exam by: Method: sterile vaginal exam performed   Dilation: Cervical Dilation (cm): 4   Effacement: Cervical Effacement: 90   Station: Fetal Station: -1        Fetal Heart Rate Assessment   Method:  external   Beats/min:  120-150   Baseline:  130   Varibility:  present   Accels:  present   Decels:  absent   Tracing Category:       Uterine Assessment   Method: Method: palpation   Frequency (min):  Every 3-5 minutes   Ctx Count in 10 min:     Duration:     Intensity:  Moderate to palpation   Intensity by IUPC:     Resting Tone: Uterine Resting Tone: soft by palpation   Resting Tone by IUPC:     [place] Units:         Constitutional:  Well developed, well nourished, no acute distress, well-groomed.   Respiratory:  Lungs are clear to auscultation bilaterally, normal breath sounds.   Cardiovascular:  Normal rate and rhythm, no murmurs.   Gastrointestinal:  Soft, gravid,  "nontender.  Uterus: Soft, nontender. Fundus appropriate for dates.  Neurologic:  Alert & oriented x 3,  no focal deficits noted.   Psychiatric:  Speech and behavior appropriate.   Extremities: no cyanosis, clubbing or edema, no evidence of DVT.        Labs:  Lab Results   Component Value Date    WBC 23.77 (H) 11/15/2024    HGB 11.6 (L) 11/15/2024    HCT 34.5 11/15/2024    MCV 88.2 11/15/2024     11/15/2024    POCGLU 84 08/15/2022    CREATININE 0.66 11/07/2024    URICACID 6.3 (H) 11/07/2024    AST 19 11/07/2024    ALT 12 11/07/2024     11/07/2024               Assessment & Plan       Normal labor        Assessment:   IUP at 38w1d weeks gestation with reactive fetal status.    2.   term labor  with ROM  3.   Obstetrical history significant for is non-contributory.  4.   GBS status: No results found for: \"STREPGPB\"   5.   Rh: positive    Plan:  Expectant management  Continuous FHT and TOCO monitoring.   Diet: clear  Desires epidural for anesthesia.   Plan of care has been reviewed with patient and questions answered.  Risks, benefits of treatment plan have been discussed.   Consent obtained to proceed with labor management and subsequent delivery of baby.        Saida Lopez CNM  11/15/2024  21:09 EST  "

## 2024-11-16 NOTE — PROGRESS NOTES
"11/15/24  22:59 EST  Rachel Pereira      ASSESSMENTS:  Now comfortable with epidural.  Making good cervical change on her own.      /62   Pulse 107   Temp 98.2 °F (36.8 °C) (Oral)   Resp 18   Ht 157.5 cm (62\")   Wt 89.4 kg (197 lb)   LMP 02/22/2024 (Approximate)   SpO2 98%   Breastfeeding No   BMI 36.03 kg/m²     Fetal Heart Rate Assessment   Method: Fetal HR Assessment Method: external   Beats/min: Fetal HR (beats/min): 130   Baseline: Fetal HR Baseline: normal range   Varibility: Fetal HR Variability: moderate (amplitude range 6 to 25 bpm)   Accels: Fetal HR Accelerations: greater than/equal to 15 bpm, lasting at least 15 seconds   Decels: Fetal HR Decelerations: absent   Tracing Category: Category 1     Uterine Assessment   Method: Method: external tocotransducer   Frequency (min): Contraction Frequency (Minutes): 2-4   Ctx Count in 10 min:     Duration:     Intensity:     Intensity by IUPC:     Resting Tone: Uterine Resting Tone: soft by palpation   Resting Tone by IUPC:     [place] Units:                                Presentation: vertex   Cervix: Exam by: Method: sterile vaginal exam performed (Saida Lopez CNM)   Dilation: Cervical Dilation (cm): 6-7   Effacement: Cervical Effacement: 100   Station: Fetal Station: -1            Lab Results   Component Value Date    WBC 23.77 (H) 11/15/2024    HGB 11.6 (L) 11/15/2024    HCT 34.5 11/15/2024    MCV 88.2 11/15/2024     11/15/2024    POCGLU 84 08/15/2022    CREATININE 0.62 11/15/2024    URICACID 5.2 11/15/2024    AST 19 11/15/2024    ALT 13 11/15/2024     11/15/2024     Results from last 7 days   Lab Units 11/15/24  2054   ABO TYPING  O   RH TYPING  Positive   ANTIBODY SCREEN  Negative       PLAN: continue expectant management    Saida Lopez CNM  22:59 EST  11/15/24  "

## 2024-11-16 NOTE — PROGRESS NOTES
Patient had episode of tachycardia, Dr. Hutchison called and ordered 500 ml bolus of fluids.  Bolus going and pulse coming back to normal range.  Will continue IV fluids at 150 cc per hour after bolus.  Encourage PO fluids also.  FHR remains reactive

## 2024-11-16 NOTE — LACTATION NOTE
24 1615   Maternal Information   Date of Referral 24   Person Making Referral lactation consultant  (new mother/baby couplet)   Maternal Reason for Referral no prior breastfeeding experience   Infant Reason for Referral  infant;tight frenulum  (baby has a very tight lingual frenulum and cannot stick his tongue out very far.)   Maternal Assessment   Breast Size Issue none   Breast Shape Bilateral:;round   Breast Density Bilateral:;soft   Nipples Bilateral:;short   Left Nipple Symptoms intact;nontender   Right Nipple Symptoms intact;nontender   Maternal Infant Feeding   Maternal Emotional State relaxed;receptive   Infant Positioning clutch/football;cross-cradle   Signs of Milk Transfer other (see comments)  (Baby was able to latch and suck better once a nipple shield was applied.)   Pain with Feeding no   Comfort Measures Before/During Feeding infant position adjusted;maternal position adjusted   Latch Assistance full assistance needed   Support Person Involvement verbally supports mother   Milk Expression/Equipment   Breast Pump Type double electric, personal  (Mom has a home Motif Duo pump.)     Baby was able to latch and nurse well once a nipple shield was used.  His lingual frenulum if very tight and he has difficulty sticking his tongue out over his gum line.  Mom was encouraged to continue to attempt nursing every 3 hours and on demand, and to call for assistance, if needed. She was provided tips for getting off to a good start breastfeeding and was referred to the breastfeeding section of her baby care booklet.

## 2024-11-16 NOTE — PROGRESS NOTES
"24  02:42 LEXIS Pereira      ASSESSMENTS:  Patient feeling some pressure,  9/100/0.  Will continue to watch closely. Heart rate was better after fluids.  Due to shaking pulse ox was not recording accurate pulse but pulse back to normal range after fluids.    /58   Pulse 87   Temp 98.8 °F (37.1 °C) (Oral)   Resp 16   Ht 157.5 cm (62\")   Wt 89.4 kg (197 lb)   LMP 2024 (Approximate)   SpO2 98%   Breastfeeding No   BMI 36.03 kg/m²     Fetal Heart Rate Assessment   Method: Fetal HR Assessment Method: external   Beats/min: Fetal HR (beats/min): 140   Baseline: Fetal HR Baseline: normal range   Varibility: Fetal HR Variability: minimal (detectable, amplitude less than or equal to 5 bpm)   Accels: Fetal HR Accelerations: absent   Decels: Fetal HR Decelerations: other (see comments) (possible deceleration noted at 0156)   Tracing Category: Category 1     Uterine Assessment   Method: Method: external tocotransducer   Frequency (min): Contraction Frequency (Minutes): poor tracing   Ctx Count in 10 min:     Duration:     Intensity: Contraction Intensity: strong by palpation   Intensity by IUPC:     Resting Tone: Uterine Resting Tone: soft by palpation   Resting Tone by IUPC:     [place] Units:                                Presentation: vertex   Cervix: Exam by: Method: sterile vaginal exam performed (per BEULAH Lopez CNM)   Dilation: Cervical Dilation (cm): 9   Effacement: Cervical Effacement: 100   Station: Fetal Station: -1            Lab Results   Component Value Date    WBC 23.77 (H) 11/15/2024    HGB 11.6 (L) 11/15/2024    HCT 34.5 11/15/2024    MCV 88.2 11/15/2024     11/15/2024    POCGLU 84 08/15/2022    CREATININE 0.62 11/15/2024    URICACID 5.2 11/15/2024    AST 19 11/15/2024    ALT 13 11/15/2024     11/15/2024     Results from last 7 days   Lab Units 11/15/24  2054   ABO TYPING  O   RH TYPING  Positive   ANTIBODY SCREEN  Negative       PLAN: Continue to " watch for further changes    Saida Lopez CNM  02:42 EST  11/16/24

## 2024-11-16 NOTE — ANESTHESIA PROCEDURE NOTES
Labor Epidural      Patient reassessed immediately prior to procedure    Patient location during procedure: OB  Start Time: 11/15/2024 10:28 PM  Stop Time: 11/15/2024 10:49 PM  Performed By  Anesthesiologist: Gustabo Hutchison MD  Preanesthetic Checklist  Completed: patient identified, IV checked, site marked, risks and benefits discussed, surgical consent, monitors and equipment checked, pre-op evaluation and timeout performed  Additional Notes  dpe  Prep:  Pt Position:sitting  Sterile Tech:cap, gloves, mask and sterile barrier  Prep:povidone-iodine 7.5% surgical scrub  Monitoring:blood pressure monitoring  Epidural Block Procedure:  Approach:midline  Guidance:landmark technique  Location:L3-L4  Needle Type:Tuohy  Needle Gauge:17 G  Loss of Resistance Medium: air  Loss of Resistance: 5cm  Cath Depth at skin:15 cm  Paresthesia: none  Aspiration:negative  Test Dose:negative  Number of Attempts: 1  Post Assessment:  Dressing:occlusive dressing applied and secured with tape  Pt Tolerance:patient tolerated the procedure well with no apparent complications  Complications:no

## 2024-11-16 NOTE — ANESTHESIA PREPROCEDURE EVALUATION
Anesthesia Evaluation     Patient summary reviewed and Nursing notes reviewed                Airway   Mallampati: I  TM distance: >3 FB  Neck ROM: full  No difficulty expected  Dental - normal exam     Pulmonary - negative pulmonary ROS and normal exam   Cardiovascular - negative cardio ROS and normal exam        Neuro/Psych- negative ROS  GI/Hepatic/Renal/Endo - negative ROS   (+) obesity, PUD, GI bleeding , thyroid problem hypothyroidism    Musculoskeletal (-) negative ROS    Abdominal  - normal exam  (+) obese    Bowel sounds: normal.   Substance History - negative use     OB/GYN negative ob/gyn ROS   (+) Pregnant        Other                    Anesthesia Plan    ASA 2 - emergent     epidural       Anesthetic plan, risks, benefits, and alternatives have been provided, discussed and informed consent has been obtained with: patient.    Use of blood products discussed with patient .    Plan discussed with attending.    CODE STATUS:    Level Of Support Discussed With: Patient  Code Status (Patient has no pulse and is not breathing): CPR (Attempt to Resuscitate)  Medical Interventions (Patient has pulse or is breathing): Full Support

## 2024-11-17 LAB
BASOPHILS # BLD AUTO: 0.1 10*3/MM3 (ref 0–0.2)
BASOPHILS NFR BLD AUTO: 0.5 % (ref 0–1.5)
DEPRECATED RDW RBC AUTO: 53.1 FL (ref 37–54)
EOSINOPHIL # BLD AUTO: 0.18 10*3/MM3 (ref 0–0.4)
EOSINOPHIL NFR BLD AUTO: 0.9 % (ref 0.3–6.2)
ERYTHROCYTE [DISTWIDTH] IN BLOOD BY AUTOMATED COUNT: 15.6 % (ref 12.3–15.4)
HCT VFR BLD AUTO: 33.4 % (ref 34–46.6)
HGB BLD-MCNC: 10.5 G/DL (ref 12–15.9)
IMM GRANULOCYTES # BLD AUTO: 0.31 10*3/MM3 (ref 0–0.05)
IMM GRANULOCYTES NFR BLD AUTO: 1.6 % (ref 0–0.5)
LYMPHOCYTES # BLD AUTO: 2.76 10*3/MM3 (ref 0.7–3.1)
LYMPHOCYTES NFR BLD AUTO: 13.9 % (ref 19.6–45.3)
MCH RBC QN AUTO: 29.2 PG (ref 26.6–33)
MCHC RBC AUTO-ENTMCNC: 31.4 G/DL (ref 31.5–35.7)
MCV RBC AUTO: 93 FL (ref 79–97)
MONOCYTES # BLD AUTO: 0.94 10*3/MM3 (ref 0.1–0.9)
MONOCYTES NFR BLD AUTO: 4.7 % (ref 5–12)
NEUTROPHILS NFR BLD AUTO: 15.54 10*3/MM3 (ref 1.7–7)
NEUTROPHILS NFR BLD AUTO: 78.4 % (ref 42.7–76)
NRBC BLD AUTO-RTO: 0 /100 WBC (ref 0–0.2)
PLATELET # BLD AUTO: 225 10*3/MM3 (ref 140–450)
PMV BLD AUTO: 10.1 FL (ref 6–12)
RBC # BLD AUTO: 3.59 10*6/MM3 (ref 3.77–5.28)
WBC NRBC COR # BLD AUTO: 19.83 10*3/MM3 (ref 3.4–10.8)

## 2024-11-17 PROCEDURE — 85025 COMPLETE CBC W/AUTO DIFF WBC: CPT | Performed by: ADVANCED PRACTICE MIDWIFE

## 2024-11-17 RX ADMIN — BUPROPION HYDROCHLORIDE 150 MG: 150 TABLET, EXTENDED RELEASE ORAL at 08:39

## 2024-11-17 RX ADMIN — IBUPROFEN 600 MG: 600 TABLET, FILM COATED ORAL at 06:03

## 2024-11-17 RX ADMIN — IBUPROFEN 600 MG: 600 TABLET, FILM COATED ORAL at 20:31

## 2024-11-17 RX ADMIN — DOCUSATE SODIUM 100 MG: 100 CAPSULE, LIQUID FILLED ORAL at 08:39

## 2024-11-17 RX ADMIN — IBUPROFEN 600 MG: 600 TABLET, FILM COATED ORAL at 14:29

## 2024-11-17 RX ADMIN — LEVOTHYROXINE SODIUM 50 MCG: 0.1 TABLET ORAL at 08:39

## 2024-11-17 RX ADMIN — DOCUSATE SODIUM 100 MG: 100 CAPSULE, LIQUID FILLED ORAL at 20:31

## 2024-11-17 NOTE — LACTATION NOTE
11/17/24 1030   Maternal Information   Date of Referral 11/17/24   Person Making Referral lactation consultant  (follow up consult)   Maternal Reason for Referral no prior breastfeeding experience  (mom in shower)   Infant Reason for Referral   (father of baby states baby is breastfeeding well. Encouraged to call for lactation services, if they have questions or concerns or if mom wants help with a breastfeeding.)

## 2024-11-17 NOTE — ANESTHESIA POSTPROCEDURE EVALUATION
Patient: Rachel Pereira    Procedure Summary       Date: 11/15/24 Room / Location:     Anesthesia Start: 2228 Anesthesia Stop: 11/16/24 0559    Procedure: LABOR ANALGESIA Diagnosis:     Scheduled Providers:  Provider: Gustabo Hutchison MD    Anesthesia Type: epidural ASA Status: 2 - Emergent            Anesthesia Type: epidural    Vitals  Vitals Value Taken Time   /69 11/16/24 1651   Temp 98.8 °F (37.1 °C) 11/16/24 1651   Pulse 89 11/16/24 1651   Resp 16 11/16/24 1651   SpO2 99 % 11/16/24 0236   Vitals shown include unfiled device data.        Post Anesthesia Care and Evaluation    Patient location during evaluation: bedside  Patient participation: complete - patient participated  Level of consciousness: awake and awake and alert  Pain score: 0  Pain management: satisfactory to patient    Airway patency: patent  Anesthetic complications: No anesthetic complications  PONV Status: none  Cardiovascular status: acceptable, hemodynamically stable and stable  Respiratory status: acceptable  Hydration status: stable  Post Neuraxial Block status: Motor and sensory function returned to baseline and No signs or symptoms of PDPH

## 2024-11-17 NOTE — PROGRESS NOTES
11/17/2024  PPD #1    Subjective   Rachel feels well.  Patient describes her lochia as less than menses.  Pain is well controlled       Objective   Temp: Temp:  [97.8 °F (36.6 °C)-99.2 °F (37.3 °C)] 98.7 °F (37.1 °C) Temp src: Oral   BP: BP: (103-155)/(55-80) 155/75        Pulse: Heart Rate:  [] 75  RR: Resp:  [16-18] 18    General:  No acute distress   Abdomen: Fundus firm and beneath umbilicus   Pelvis: deferred     Lab Results   Component Value Date    WBC 23.77 (H) 11/15/2024    HGB 11.6 (L) 11/15/2024    HCT 34.5 11/15/2024    MCV 88.2 11/15/2024     11/15/2024    POCGLU 84 08/15/2022    CREATININE 0.62 11/15/2024    URICACID 5.2 11/15/2024    AST 19 11/15/2024    ALT 13 11/15/2024     11/15/2024    HEPBSAG Negative 05/07/2024     Results from last 7 days   Lab Units 11/15/24  2054   ABO TYPING  O   RH TYPING  Positive   ANTIBODY SCREEN  Negative       Assessment  PPD# 1 after vaginal delivery    Plan  Supportive care, anticipate d/c in am.      This note has been electronically signed.    Saida Lopez CNM  02:44 EST  November 17, 2024

## 2024-11-18 VITALS
WEIGHT: 197 LBS | BODY MASS INDEX: 36.25 KG/M2 | DIASTOLIC BLOOD PRESSURE: 73 MMHG | HEART RATE: 84 BPM | TEMPERATURE: 98.6 F | RESPIRATION RATE: 16 BRPM | HEIGHT: 62 IN | OXYGEN SATURATION: 98 % | SYSTOLIC BLOOD PRESSURE: 123 MMHG

## 2024-11-18 RX ORDER — IBUPROFEN 600 MG/1
600 TABLET, FILM COATED ORAL EVERY 6 HOURS PRN
Qty: 60 TABLET | Refills: 1 | Status: SHIPPED | OUTPATIENT
Start: 2024-11-18

## 2024-11-18 RX ORDER — PSEUDOEPHEDRINE HCL 30 MG
100 TABLET ORAL 2 TIMES DAILY PRN
Qty: 60 CAPSULE | Refills: 1 | Status: SHIPPED | OUTPATIENT
Start: 2024-11-18

## 2024-11-18 RX ADMIN — Medication: at 15:44

## 2024-11-18 RX ADMIN — Medication 1 APPLICATION: at 15:44

## 2024-11-18 RX ADMIN — LEVOTHYROXINE SODIUM 50 MCG: 0.1 TABLET ORAL at 08:44

## 2024-11-18 RX ADMIN — BUPROPION HYDROCHLORIDE 150 MG: 150 TABLET, EXTENDED RELEASE ORAL at 08:44

## 2024-11-18 RX ADMIN — DOCUSATE SODIUM 100 MG: 100 CAPSULE, LIQUID FILLED ORAL at 08:44

## 2024-11-18 RX ADMIN — WITCH HAZEL: 500 SOLUTION RECTAL; TOPICAL at 15:44

## 2024-11-18 RX ADMIN — IBUPROFEN 600 MG: 600 TABLET, FILM COATED ORAL at 08:51

## 2024-11-18 NOTE — DISCHARGE SUMMARY
[facility]  Vaginal delivery discharge summary      Patient: Rachel Pereira      MR#:[id number]  Admission  Diagnosis: Present on Admission:  **None**      Discharge Diagnosis: Active and Resolved Problems  Active Hospital Problems    Diagnosis  POA     (normal spontaneous vaginal delivery) [O80]  Not Applicable      Resolved Hospital Problems    Diagnosis Date Resolved POA    **Normal labor [O80, Z37.9] 2024 Not Applicable            Date of Admission: 11/15/2024  Date of Discharge:  2024    Procedures:  Vaginal, Spontaneous    2024   5:53 AM     Service:  Obstetrics    Hospital Course:  Patient underwent vaginal delivery and remained in the hospital for 3 days.  During that time she remained afebrile and hemodynamically stable.  On the day of discharge, she was eating, ambulating and voiding without difficulty.  She is breastfeeding.     Labs:    Lab Results   Component Value Date    WBC 19.83 (H) 2024    HGB 10.5 (L) 2024    HCT 33.4 (L) 2024    MCV 93.0 2024     2024    POCGLU 84 08/15/2022    CREATININE 0.62 11/15/2024    URICACID 5.2 11/15/2024    AST 19 11/15/2024    ALT 13 11/15/2024     11/15/2024     Results from last 7 days   Lab Units 11/15/24  2054   ABO TYPING  O   RH TYPING  Positive   ANTIBODY SCREEN  Negative            Discharge Medications        New Medications        Instructions Start Date   docusate sodium 100 MG capsule   100 mg, Oral, 2 Times Daily PRN      ibuprofen 600 MG tablet  Commonly known as: ADVIL,MOTRIN   600 mg, Oral, Every 6 Hours PRN             Continue These Medications        Instructions Start Date   buPROPion  MG 24 hr tablet  Commonly known as: Wellbutrin XL   150 mg, Oral, Daily      busPIRone 5 MG tablet  Commonly known as: BUSPAR   10 mg, Oral, 2 Times Daily PRN      levothyroxine 50 MCG tablet  Commonly known as: Synthroid   50 mcg, Oral, Daily      PRENATAL VITAMIN PO   Take  by mouth.                Discharge Disposition:  To Home    Discharge Condition:  Stable    Discharge Diet: Regular    Activity at Discharge: Pelvic rest    Follow-up Appointments  Follow up with [facility] in 6 weeks.     Stella Merrill CNM  11/18/24  09:09 EST

## 2024-11-18 NOTE — LACTATION NOTE
24   Maternal Information   Date of Referral 24   Person Making Referral lactation consultant   Maternal Reason for Referral no prior breastfeeding experience   Infant Reason for Referral  infant   Maternal Assessment   Left Nipple Symptoms intact;tender   Right Nipple Symptoms intact;tender   Lactation Referrals   Lactation Referrals outpatient lactation program     Courtesy follow up visit for newly postpartum couplet. MOB reports well, states some initial tenderness with latch that resolves after approx. 1 minute. MOB using silverettes and own nipple cream for tenderness between feeds. Encouraged to call out for concerns through hospitalization and f/u with outpatient clinic PRN after discharge, verbalized understanding. Denies further questions or concerns at this time.

## 2024-11-23 RX ORDER — BUSPIRONE HYDROCHLORIDE 5 MG/1
10 TABLET ORAL 2 TIMES DAILY PRN
Qty: 120 TABLET | Refills: 1 | OUTPATIENT
Start: 2024-11-23

## 2024-11-27 ENCOUNTER — MATERNAL SCREENING (OUTPATIENT)
Dept: CALL CENTER | Facility: HOSPITAL | Age: 24
End: 2024-11-27
Payer: COMMERCIAL

## 2024-11-27 NOTE — OUTREACH NOTE
Maternal Screening Survey      Flowsheet Row Responses   Facility patient discharged from? [facility]   Attempt successful? Yes   Call start time 162   Call end time 162   I have been able to laugh and see the funny side of things. 0   I have looked forward with enjoyment to things. 0   I have blamed myself unnecessarily when things went wrong. 2   I have been anxious or worried for no good reason. 2   I have felt scared or panicky for no good reason. 1   Things have been getting on top of me. 0   I have been so unhappy that I have had difficulty sleeping. 0   I have felt sad or miserable. 0   I have been so unhappy that I have been crying. 1   The thought of harming myself has occurred to me. 0   [place]  Depression Scale Total 6   Did any of your parents have problems with alcohol or drug use? No   Do any of your peers have problems with alcohol or drug use? No   Does your partner have problems with alcohol or drug use? No   Before you were pregnant did you have problems with alcohol or drug use? (past) No   In the past month, did you drink beer, wine, liquor or use any other drugs? (pregnancy) No   Maternal Screening call completed Yes   Call end time               NANCIE LAM - Registered Nurse

## 2024-11-27 NOTE — OUTREACH NOTE
Maternal Screening Survey      Flowsheet Row Responses   Facility patient discharged from? [facility]   Attempt successful? No   Unsuccessful attempts Attempt 1  [left msg  on vm]              NANCIE LAM - Registered Nurse

## 2025-01-20 ENCOUNTER — PATIENT MESSAGE (OUTPATIENT)
Dept: INTERNAL MEDICINE | Facility: CLINIC | Age: 25
End: 2025-01-20
Payer: COMMERCIAL

## 2025-01-21 RX ORDER — NYSTATIN 100000 U/G
1 CREAM TOPICAL 2 TIMES DAILY
Qty: 30 G | Refills: 0 | Status: SHIPPED | OUTPATIENT
Start: 2025-01-21

## 2025-01-24 ENCOUNTER — LAB (OUTPATIENT)
Dept: LAB | Facility: HOSPITAL | Age: 25
End: 2025-01-24
Payer: COMMERCIAL

## 2025-01-24 DIAGNOSIS — D72.829 LEUKOCYTOSIS, UNSPECIFIED TYPE: Primary | ICD-10-CM

## 2025-01-28 ENCOUNTER — PATIENT MESSAGE (OUTPATIENT)
Dept: INTERNAL MEDICINE | Facility: CLINIC | Age: 25
End: 2025-01-28
Payer: COMMERCIAL

## 2025-01-28 DIAGNOSIS — D72.829 LEUKOCYTOSIS, UNSPECIFIED TYPE: ICD-10-CM

## 2025-01-28 DIAGNOSIS — D72.821 MONOCYTOSIS: Primary | ICD-10-CM

## 2025-01-28 LAB — REF LAB TEST METHOD: NORMAL

## 2025-03-04 ENCOUNTER — CONSULT (OUTPATIENT)
Dept: ONCOLOGY | Facility: CLINIC | Age: 25
End: 2025-03-04
Payer: COMMERCIAL

## 2025-03-04 ENCOUNTER — OFFICE VISIT (OUTPATIENT)
Dept: INTERNAL MEDICINE | Facility: CLINIC | Age: 25
End: 2025-03-04
Payer: COMMERCIAL

## 2025-03-04 ENCOUNTER — LAB (OUTPATIENT)
Dept: LAB | Facility: HOSPITAL | Age: 25
End: 2025-03-04
Payer: COMMERCIAL

## 2025-03-04 VITALS
RESPIRATION RATE: 20 BRPM | HEIGHT: 62 IN | DIASTOLIC BLOOD PRESSURE: 77 MMHG | OXYGEN SATURATION: 98 % | TEMPERATURE: 98.5 F | HEART RATE: 82 BPM | WEIGHT: 186 LBS | BODY MASS INDEX: 34.23 KG/M2 | SYSTOLIC BLOOD PRESSURE: 119 MMHG

## 2025-03-04 VITALS
HEIGHT: 62 IN | WEIGHT: 185 LBS | DIASTOLIC BLOOD PRESSURE: 82 MMHG | OXYGEN SATURATION: 100 % | BODY MASS INDEX: 34.04 KG/M2 | SYSTOLIC BLOOD PRESSURE: 122 MMHG | HEART RATE: 99 BPM | TEMPERATURE: 98 F

## 2025-03-04 DIAGNOSIS — D72.828 NEUTROPHILIA: ICD-10-CM

## 2025-03-04 DIAGNOSIS — B37.9 CANDIDIASIS: ICD-10-CM

## 2025-03-04 DIAGNOSIS — E03.9 ACQUIRED HYPOTHYROIDISM: ICD-10-CM

## 2025-03-04 DIAGNOSIS — F41.9 ANXIETY AND DEPRESSION: Primary | ICD-10-CM

## 2025-03-04 DIAGNOSIS — D72.828 NEUTROPHILIA: Primary | ICD-10-CM

## 2025-03-04 DIAGNOSIS — F32.A ANXIETY AND DEPRESSION: Primary | ICD-10-CM

## 2025-03-04 LAB
BASOPHILS # BLD AUTO: 0.03 10*3/MM3 (ref 0–0.2)
BASOPHILS NFR BLD AUTO: 0.3 % (ref 0–1.5)
CHROMATIN AB SERPL-ACNC: <10 IU/ML (ref 0–14)
CRP SERPL-MCNC: 0.61 MG/DL (ref 0–0.5)
DEPRECATED RDW RBC AUTO: 42.8 FL (ref 37–54)
EOSINOPHIL # BLD AUTO: 0.19 10*3/MM3 (ref 0–0.4)
EOSINOPHIL NFR BLD AUTO: 2 % (ref 0.3–6.2)
ERYTHROCYTE [DISTWIDTH] IN BLOOD BY AUTOMATED COUNT: 13.6 % (ref 12.3–15.4)
ERYTHROCYTE [SEDIMENTATION RATE] IN BLOOD: 27 MM/HR (ref 0–20)
FERRITIN SERPL-MCNC: 76.41 NG/ML (ref 13–150)
FOLATE SERPL-MCNC: >20 NG/ML (ref 4.78–24.2)
HAV IGM SERPL QL IA: NORMAL
HBV CORE IGM SERPL QL IA: NORMAL
HBV SURFACE AG SERPL QL IA: NORMAL
HCT VFR BLD AUTO: 39.4 % (ref 34–46.6)
HCV AB SER QL: NORMAL
HGB BLD-MCNC: 13.3 G/DL (ref 12–15.9)
HIV 1+2 AB+HIV1 P24 AG SERPL QL IA: NORMAL
IMM GRANULOCYTES # BLD AUTO: 0.01 10*3/MM3 (ref 0–0.05)
IMM GRANULOCYTES NFR BLD AUTO: 0.1 % (ref 0–0.5)
IRON 24H UR-MRATE: 44 MCG/DL (ref 37–145)
IRON SATN MFR SERPL: 10 % (ref 20–50)
LYMPHOCYTES # BLD AUTO: 2.06 10*3/MM3 (ref 0.7–3.1)
LYMPHOCYTES NFR BLD AUTO: 21.7 % (ref 19.6–45.3)
MCH RBC QN AUTO: 28.9 PG (ref 26.6–33)
MCHC RBC AUTO-ENTMCNC: 33.8 G/DL (ref 31.5–35.7)
MCV RBC AUTO: 85.5 FL (ref 79–97)
MONOCYTES # BLD AUTO: 0.62 10*3/MM3 (ref 0.1–0.9)
MONOCYTES NFR BLD AUTO: 6.5 % (ref 5–12)
NEUTROPHILS NFR BLD AUTO: 6.6 10*3/MM3 (ref 1.7–7)
NEUTROPHILS NFR BLD AUTO: 69.4 % (ref 42.7–76)
PLATELET # BLD AUTO: 395 10*3/MM3 (ref 140–450)
PMV BLD AUTO: 9 FL (ref 6–12)
RBC # BLD AUTO: 4.61 10*6/MM3 (ref 3.77–5.28)
RETICS # AUTO: 0.09 10*6/MM3 (ref 0.02–0.13)
RETICS/RBC NFR AUTO: 1.98 % (ref 0.7–1.9)
TIBC SERPL-MCNC: 448 MCG/DL (ref 298–536)
TRANSFERRIN SERPL-MCNC: 301 MG/DL (ref 200–360)
VIT B12 BLD-MCNC: 839 PG/ML (ref 211–946)
WBC NRBC COR # BLD AUTO: 9.51 10*3/MM3 (ref 3.4–10.8)

## 2025-03-04 PROCEDURE — 85045 AUTOMATED RETICULOCYTE COUNT: CPT

## 2025-03-04 PROCEDURE — 81270 JAK2 GENE: CPT

## 2025-03-04 PROCEDURE — 82746 ASSAY OF FOLIC ACID SERUM: CPT

## 2025-03-04 PROCEDURE — 82728 ASSAY OF FERRITIN: CPT

## 2025-03-04 PROCEDURE — 86140 C-REACTIVE PROTEIN: CPT

## 2025-03-04 PROCEDURE — 85025 COMPLETE CBC W/AUTO DIFF WBC: CPT

## 2025-03-04 PROCEDURE — 36415 COLL VENOUS BLD VENIPUNCTURE: CPT

## 2025-03-04 PROCEDURE — 86038 ANTINUCLEAR ANTIBODIES: CPT

## 2025-03-04 PROCEDURE — 81479 UNLISTED MOLECULAR PATHOLOGY: CPT

## 2025-03-04 PROCEDURE — 99204 OFFICE O/P NEW MOD 45 MIN: CPT | Performed by: INTERNAL MEDICINE

## 2025-03-04 PROCEDURE — 85652 RBC SED RATE AUTOMATED: CPT

## 2025-03-04 PROCEDURE — 80074 ACUTE HEPATITIS PANEL: CPT

## 2025-03-04 PROCEDURE — 82607 VITAMIN B-12: CPT

## 2025-03-04 PROCEDURE — G0432 EIA HIV-1/HIV-2 SCREEN: HCPCS

## 2025-03-04 PROCEDURE — 83540 ASSAY OF IRON: CPT

## 2025-03-04 PROCEDURE — 86431 RHEUMATOID FACTOR QUANT: CPT

## 2025-03-04 PROCEDURE — 84466 ASSAY OF TRANSFERRIN: CPT

## 2025-03-04 PROCEDURE — 99214 OFFICE O/P EST MOD 30 MIN: CPT | Performed by: FAMILY MEDICINE

## 2025-03-04 RX ORDER — FLUCONAZOLE 100 MG/1
100 TABLET ORAL DAILY
Qty: 5 TABLET | Refills: 0 | Status: SHIPPED | OUTPATIENT
Start: 2025-03-04 | End: 2025-03-14 | Stop reason: SDUPTHER

## 2025-03-04 NOTE — PROGRESS NOTES
Rachel Pereira is a 24 y.o. female.    Chief Complaint   Patient presents with    Hypothyroidism    Anxiety    Depression       HPI   History of Present Illness  The patient presents for follow-up on anxiety, depression, hypothyroidism, candidiasis of the nipples, and dizziness.    Her anxiety has worsened, while depression remains stable. She experiences persistent worry and insomnia, using BuSpar nightly. Anxiety intensifies when out with her baby. She is open to increasing Wellbutrin or trying BuSpar during the day. Wellbutrin is effective at night without side effects. BuSpar induces mild drowsiness, so she plans to try it during the day.    No palpitations, hot flashes, or cold chills related to thyroid condition. Occasional night sweats, fatigue, alternating diarrhea and constipation. Hematology appointment today with labs ordered. Follow-up at the end of the month.    Continues nystatin cream for candidiasis of the nipples, with oral drops during the day. Lactation specialist recommended apple cider vinegar water mix, which was ineffective. Persistent mild itching, redness, and burning sensation during nursing. Suspects reinfecting baby with thrush. Baby refuses bottle since onset of thrush. Plans to return to the doctor at the end of the month or earlier if condition worsens. Tried gentian violet, initially effective but thrush recurred. Dealing with this for almost 2 months. Taking prenatal vitamins and Florastor, interested in starting a regular probiotic. Experienced abdominal pain yesterday, improved today.    Experiencing intermittent dizziness and lightheadedness, persisting after eating. Severity varies. Previously diagnosed with anemia, unsure if still a concern.        The following portions of the patient's history were reviewed and updated as appropriate: allergies, current medications, past family history, past medical history, past social history, past surgical history and problem  "list.     Allergies   Allergen Reactions    Sulfa Antibiotics Rash         Current Outpatient Medications:     buPROPion XL (Wellbutrin XL) 150 MG 24 hr tablet, Take 1 tablet by mouth Daily., Disp: 90 tablet, Rfl: 3    busPIRone (BUSPAR) 5 MG tablet, Take 2 tablets by mouth 2 (Two) Times a Day As Needed (anxiety)., Disp: 90 tablet, Rfl: 1    levothyroxine (Synthroid) 50 MCG tablet, Take 1 tablet by mouth Daily., Disp: 90 tablet, Rfl: 1    nystatin (MYCOSTATIN) 750531 UNIT/GM cream, Apply 1 Application topically to the appropriate area as directed 2 (Two) Times a Day., Disp: 30 g, Rfl: 0    Prenatal Vit-Fe Fumarate-FA (PRENATAL VITAMIN PO), Take  by mouth., Disp: , Rfl:     fluconazole (Diflucan) 100 MG tablet, Take 1 tablet by mouth Daily., Disp: 5 tablet, Rfl: 0    norethindrone (Ortho Micronor) 0.35 MG tablet, Take 1 tablet by mouth Daily., Disp: 90 tablet, Rfl: 1    ROS    Review of Systems   Constitutional:  Positive for fatigue. Negative for chills and fever.   Respiratory:  Negative for shortness of breath.    Cardiovascular:  Negative for chest pain and palpitations.   Gastrointestinal:  Positive for constipation and diarrhea. Negative for abdominal pain, nausea and vomiting.   Endocrine: Positive for heat intolerance.   Skin:  Positive for rash.   Psychiatric/Behavioral:  Positive for depressed mood. The patient is nervous/anxious.        Vitals:    03/04/25 1613   BP: 122/82   Pulse: 99   Temp: 98 °F (36.7 °C)   SpO2: 100%   Weight: 83.9 kg (185 lb)   Height: 157.5 cm (62\")   PainSc: 0-No pain         Physical Exam     Physical Exam  Constitutional:       General: She is not in acute distress.     Appearance: Normal appearance. She is well-developed.   HENT:      Head: Normocephalic and atraumatic.      Right Ear: External ear normal.      Left Ear: External ear normal.   Eyes:      Extraocular Movements: Extraocular movements intact.      Conjunctiva/sclera: Conjunctivae normal.   Cardiovascular:      Rate " and Rhythm: Normal rate and regular rhythm.      Heart sounds: No murmur heard.  Pulmonary:      Effort: Pulmonary effort is normal. No respiratory distress.      Breath sounds: Normal breath sounds. No wheezing.   Abdominal:      General: Bowel sounds are normal. There is no distension.      Palpations: Abdomen is soft.      Tenderness: There is no abdominal tenderness.   Musculoskeletal:      Right lower leg: No edema.      Left lower leg: No edema.   Skin:     General: Skin is warm and dry.   Neurological:      Mental Status: She is alert and oriented to person, place, and time.      Cranial Nerves: No cranial nerve deficit.   Psychiatric:         Mood and Affect: Mood normal.         Behavior: Behavior normal.         Diagnoses and all orders for this visit:    1. Anxiety and depression (Primary)    2. Acquired hypothyroidism    3. Candidiasis    Other orders  -     Discontinue: fluconazole (Diflucan) 100 MG tablet; Take 1 tablet by mouth Daily.  Dispense: 5 tablet; Refill: 0        Assessment & Plan  1. Anxiety and depression.  Stable with current medication. Continue Wellbutrin, may take BuSpar during the day. If BuSpar causes daytime sleepiness, consider increasing Wellbutrin.    2. Candidiasis of the nipples.  Resistant to nystatin, difficulty with baby's thrush. Prescribed Diflucan 100 mg daily for 5 days. Notify clinic if symptoms persist for further evaluation.    3. Hypothyroidism.  Last labs normal. Maintain current levothyroxine dosage.        No orders of the defined types were placed in this encounter.      No orders of the defined types were placed in this encounter.      Return in about 3 months (around 6/4/2025) for anxiety and depression.    Edie Grant DO

## 2025-03-04 NOTE — PROGRESS NOTES
DATE OF CONSULTATION: 3/4/2025    REFERRING PHYSICIAN: Edie Grant DO    Dear Edie Blandon DO  Thank you for asking for my medical advice on this patient. I saw her in the  [place] office on 3/4/2025    REASON FOR CONSULTATION: Leukocytosis with neutrophilia    PROBLEM LIST:   1.  Leukocytosis neutrophilia:  A.  Started February 2023.  B.  White cells ranged between 11 and 24.  2.  Normocytic anemia  3.  Hypothyroid    HISTORY OF PRESENT ILLNESS: The patient is a very pleasant 24 y.o.  female   who was in her usual state of health until November 2024.  The patient present for routine follow-up visit with her primary care provider.  She had blood work done that revealed leukocytosis.  This has persisted in the last few visits.  The patient was referred to me for further recommendations.    SUBJECTIVE: When I saw the patient today she is here by herself.  She is complaining of fatigue.  Denies any fever chills or night sweats.    Review of Systems   Constitutional:  Positive for fatigue.   Respiratory: Negative.     Cardiovascular: Negative.    Gastrointestinal: Negative.    Musculoskeletal:  Positive for arthralgias.       Past Medical History:   Diagnosis Date    Anemia     Anxiety     Asthma     exercise induced    Body piercing     both ears, left nare    COVID-19 vaccine series completed     Pfizer x 2    Depression     Disease of thyroid gland     History of stomach ulcers     Hypothyroidism     Peptic ulceration     Seasonal allergies     Tattoos     UTI (urinary tract infection)     Wears glasses        Social History     Socioeconomic History    Marital status:     Number of children: 0   Tobacco Use    Smoking status: Former     Types: Electronic Cigarette     Passive exposure: Yes    Smokeless tobacco: Never    Tobacco comments:     Only smoke every once in a while when drinking   Vaping Use    Vaping status: Never Used   Substance and Sexual Activity    Alcohol use: Not  Currently     Alcohol/week: 1.0 - 2.0 standard drink of alcohol     Types: 1 - 2 Glasses of wine per week    Drug use: Never    Sexual activity: Yes     Partners: Male     Birth control/protection: None       Family History   Problem Relation Age of Onset    Hypertension Father     Depression Father     Anxiety disorder Father     Thyroid disease Mother     Depression Mother     Narcolepsy Mother     Anxiety disorder Mother     Arthritis Mother     Heart disease Paternal Grandfather     Mental illness Maternal Grandmother     Heart disease Maternal Grandmother         probable    Diabetes Maternal Grandfather     Colon cancer Maternal Aunt     Arthritis Other     Thyroid disease Other     Diabetes Other     Hypertension Other     Mental illness Other     Heart disease Paternal Grandmother        Past Surgical History:   Procedure Laterality Date    CHOLECYSTECTOMY N/A 08/26/2022    Procedure: CHOLECYSTECTOMY LAPAROSCOPIC;  Surgeon: Abbi Reynolds MD;  Location: [facility] OR;  Service: General;  Laterality: N/A;    COLONOSCOPY N/A 11/01/2022    Procedure: COLONOSCOPY WITH BIOPSY;  Surgeon: Abbi Reynolds MD;  Location: [facility] ENDOSCOPY;  Service: Gastroenterology;  Laterality: N/A;    ENDOSCOPY      WISDOM TOOTH EXTRACTION         Allergies   Allergen Reactions    Sulfa Antibiotics Rash          Current Outpatient Medications:     buPROPion XL (Wellbutrin XL) 150 MG 24 hr tablet, Take 1 tablet by mouth Daily., Disp: 90 tablet, Rfl: 3    busPIRone (BUSPAR) 5 MG tablet, Take 2 tablets by mouth 2 (Two) Times a Day As Needed (anxiety)., Disp: 90 tablet, Rfl: 1    levothyroxine (Synthroid) 50 MCG tablet, Take 1 tablet by mouth Daily., Disp: 90 tablet, Rfl: 1    nystatin (MYCOSTATIN) 957527 UNIT/GM cream, Apply 1 Application topically to the appropriate area as directed 2 (Two) Times a Day., Disp: 30 g, Rfl: 0    Prenatal Vit-Fe Fumarate-FA (PRENATAL VITAMIN PO), Take  by mouth., Disp: , Rfl:     docusate sodium 100 MG  "capsule, Take 1 capsule by mouth 2 (Two) Times a Day As Needed for Constipation., Disp: 60 capsule, Rfl: 1    ibuprofen (ADVIL,MOTRIN) 600 MG tablet, Take 1 tablet by mouth Every 6 (Six) Hours As Needed for Mild Pain, Disp: 60 tablet, Rfl: 1    PHYSICAL EXAMINATION:   /77 Comment: LUE  Pulse 82   Temp 98.5 °F (36.9 °C) (Temporal)   Resp 20   Ht 157.5 cm (62\")   Wt 84.4 kg (186 lb)   SpO2 98% Comment: RA  BMI 34.02 kg/m²   Pain Score    03/04/25 1052   PainSc: 0-No pain      ECOG score: 0            ECOG Performance Status: 1 - Symptomatic but completely ambulatory  General Appearance:  alert, cooperative, no apparent distress and appears stated age   Neurologic/Psychiatric: A&O x 3, gait steady, appropriate affect, strength 5/5 in all muscle groups   HEENT:  Normocephalic, without obvious abnormality, mucous membranes moist   Neck: Supple, symmetrical, trachea midline, no adenopathy;  No thyromegaly, masses, or tenderness   Lungs:   Clear to auscultation bilaterally; respirations regular, even, and unlabored bilaterally   Heart:  Regular rate and rhythm, no murmurs appreciated   Abdomen:   Soft, non-tender, non-distended, and no organomegaly   Lymph nodes: No cervical, supraclavicular, inguinal or axillary adenopathy noted   Extremities: Normal, atraumatic; no clubbing, cyanosis, or edema    Skin: No rashes, ulcers, or suspicious lesions noted       No visits with results within 2 Week(s) from this visit.   Latest known visit with results is:   Lab on 01/24/2025   Component Date Value Ref Range Status    Reference Lab Report 01/24/2025    Final                    Value:[facility]  [address] [place] [address]  Phone: [phone] or [phone]  Fax: [phone]  Rakan Mcguire M.D., Medical Director    PATIENT NAME                                 LABORATORY NO.  PURNIMA EVANGELISTA.                     [id number]  [id number]  Psychiatric   "                      AGE                SEX     N            CLIENT REF #  [id number]       2000   F       [id number]    [id number]  [address]  [address]                                    REQUESTING M.D.       ATTENDING M.D..        [place] [address]                             OLIVER SHAH    DATE COLLECTED        DATE RECEIVED          DATE REPORTED  2025    DIAGNOSIS:  PERIPHERAL SMEAR  Neutrophilic leukocytosis with monocytosis.  Granulocytes show occasional  toxic granules and no circulating blasts are identified.  Normocytic, normochromic anemia.  No increase in                           schistocytes population.  Adequate platelets      CLINICAL HISTORY:  Leukocytosis, unspecified type    CLINICAL LABORATORY DATA  WBC        19.83 x10/3/-L    RDW         15.6%  HGB        10.5 g/dl         PLT         225 x10/3/-L  HCT        33.4%             LYMPHS      13.9%  MCV        93.0fL            NEUTS       78.4%  MONO        4.7%  EOS         0.9%  BASO        0.5%    PERIPHERAL SMEAR MICROSCOPIC DESCRIPTION:  [place] stained smears are reviewed microscopically. See diagnosis for details.    Professional interpretation rendered by Jerica Nath M.D., MPH at [facility], [address] [place] [address].    GROSS DESCRIPTION:  Received 1 stained and 1 unstained slides for review. MTS    REVIEWED, DIAGNOSED AND ELECTRONICALLY  SIGNED BY:    Jerica Nath M.D., MPH  CPT CODES:        02840          No results found.      DIAGNOSTIC DATA:   Extensive patient medical records including doctor notes, blood work results, pathology report and imaging reports reviewed by me and documented in the patient's chart.      ASSESSMENT: The patient is a very pleasant 24 y.o.  female  with leukocytosis    PLAN:     1.  Leukocytosis neutrophilia:  A.  I had a long discussion today with the patient about her  new diagnosis of leukocytosis  with neutrophilia. I reviewed the patient's documents including refereing provider's notes, lab results, imaging report.   B.  I explained to the patient that her neutrophilia is chronic at least for the last 2 years.  It did increase over the last 6 months.  C.  Differential diagnosis include reactive versus bone marrow dysfunction.  In order to narrow down the differential diagnosis I will proceed following workup: Repeat CBC with differential, ESR and CRP to look for inflammation, YEISON and RF to look for autoimmune markers, HIV and hepatitis profile for infectious screening, JAK2 mutation and BCR-ABL for possible myeloproliferative neoplasms.    2.  Normocytic anemia:  A.  I will check the patient CBC today, iron profile vitamin levels and reticulocyte count.    FOLLOW UP: 1 month to go over the results.    Rhonda Smith MD  3/4/2025

## 2025-03-05 ENCOUNTER — PATIENT MESSAGE (OUTPATIENT)
Dept: INTERNAL MEDICINE | Facility: CLINIC | Age: 25
End: 2025-03-05
Payer: COMMERCIAL

## 2025-03-05 LAB — ANA SER QL: NEGATIVE

## 2025-03-05 RX ORDER — ACETAMINOPHEN AND CODEINE PHOSPHATE 120; 12 MG/5ML; MG/5ML
1 SOLUTION ORAL DAILY
Qty: 90 TABLET | Refills: 1 | Status: SHIPPED | OUTPATIENT
Start: 2025-03-05 | End: 2026-03-05

## 2025-03-09 LAB
CITATION REF LAB TEST: NORMAL
JAK2 P.V617F BLD/T QL: NORMAL
LAB DIRECTOR NAME PROVIDER: NORMAL
LABORATORY COMMENT REPORT: NORMAL
REF LAB TEST METHOD: NORMAL

## 2025-03-11 LAB
LAB DIRECTOR NAME PROVIDER: NORMAL
LABORATORY COMMENT REPORT: NORMAL
OBSERVATION IMP: NEGATIVE
REF LAB TEST METHOD: NORMAL
T(ABL1,BCR)B2A2/CONTROL BLD/T: NORMAL %
T(ABL1,BCR)B3A2/CONTROL BLD/T: NORMAL %
T(ABL1,BCR)E1A2/CONTROL BLD/T: NORMAL %
T(ABL1,BCR)E1A2/CONTROL BLD/T: NORMAL %

## 2025-03-13 RX ORDER — FLUCONAZOLE 100 MG/1
100 TABLET ORAL DAILY
Qty: 5 TABLET | Refills: 0 | Status: CANCELLED | OUTPATIENT
Start: 2025-03-13

## 2025-03-14 ENCOUNTER — PATIENT MESSAGE (OUTPATIENT)
Dept: INTERNAL MEDICINE | Facility: CLINIC | Age: 25
End: 2025-03-14
Payer: COMMERCIAL

## 2025-03-14 RX ORDER — FLUCONAZOLE 100 MG/1
100 TABLET ORAL DAILY
Qty: 5 TABLET | Refills: 0 | Status: SHIPPED | OUTPATIENT
Start: 2025-03-14 | End: 2025-03-14 | Stop reason: SDUPTHER

## 2025-03-14 RX ORDER — FLUCONAZOLE 100 MG/1
100 TABLET ORAL DAILY
Qty: 5 TABLET | Refills: 0 | Status: SHIPPED | OUTPATIENT
Start: 2025-03-14

## 2025-03-14 NOTE — TELEPHONE ENCOUNTER
Rx Refill Note  Requested Prescriptions     Signed Prescriptions Disp Refills    fluconazole (Diflucan) 100 MG tablet 5 tablet 0     Sig: Take 1 tablet by mouth Daily.     Authorizing Provider: OLIVER SHAH     Ordering User: HARSHAL CARPENTER      Last office visit with prescribing clinician: 3/4/2025   Last telemedicine visit with prescribing clinician: Visit date not found   Next office visit with prescribing clinician: 6/5/2025                         Would you like a call back once the refill request has been completed: [] Yes [] No    If the office needs to give you a call back, can they leave a voicemail: [] Yes [] No    Harshal Carpenter MA  03/14/25, 16:51 EDT

## 2025-03-25 ENCOUNTER — OFFICE VISIT (OUTPATIENT)
Dept: ONCOLOGY | Facility: CLINIC | Age: 25
End: 2025-03-25
Payer: COMMERCIAL

## 2025-03-25 VITALS
HEIGHT: 62 IN | DIASTOLIC BLOOD PRESSURE: 74 MMHG | WEIGHT: 180 LBS | TEMPERATURE: 97.7 F | HEART RATE: 110 BPM | SYSTOLIC BLOOD PRESSURE: 130 MMHG | OXYGEN SATURATION: 98 % | RESPIRATION RATE: 16 BRPM | BODY MASS INDEX: 33.13 KG/M2

## 2025-03-25 DIAGNOSIS — D72.828 NEUTROPHILIA: Primary | ICD-10-CM

## 2025-03-25 PROCEDURE — 99214 OFFICE O/P EST MOD 30 MIN: CPT | Performed by: INTERNAL MEDICINE

## 2025-03-25 NOTE — PROGRESS NOTES
DATE OF VISIT: 3/25/2025    REASON FOR VISIT: Followup for leukocytosis with neutrophilia     PROBLEM LIST:  1. Leukocytosis neutrophilia:  A.  Started February 2023.  B.  White cells ranged between 11 and 24.  2.  Normocytic anemia  3.  Hypothyroid    HISTORY OF PRESENT ILLNESS: The patient is a very pleasant 24 y.o. female  with past medical history significant for leukocytosis with neutrophilia diagnosed February 2023. The  patient is here today for scheduled follow-up visit.    SUBJECTIVE: The patient is here today by herself.  All in all she is doing well.  No fever or chills.  Denies any bleeding.    Past History:  Medical History: has a past medical history of Anemia, Anxiety, Asthma, Body piercing, COVID-19 vaccine series completed, Depression, Disease of thyroid gland, History of stomach ulcers, Hypothyroidism, Peptic ulceration, Seasonal allergies, Tattoos, UTI (urinary tract infection), and Wears glasses.   Surgical History: has a past surgical history that includes [place] tooth extraction; Esophagogastroduodenoscopy; Cholecystectomy (N/A, 08/26/2022); and Colonoscopy (N/A, 11/01/2022).   Family History: family history includes Anxiety disorder in her father and mother; Arthritis in her mother and another family member; Colon cancer in her maternal aunt; Depression in her father and mother; Diabetes in her maternal grandfather and another family member; Heart disease in her maternal grandmother, paternal grandfather, and paternal grandmother; Hypertension in her father and another family member; Mental illness in her maternal grandmother and another family member; Narcolepsy in her mother; Thyroid disease in her mother and another family member.   Social History: reports that she has quit smoking. Her smoking use included electronic cigarette. She has been exposed to tobacco smoke. She has never used smokeless tobacco. She reports that she does not currently use alcohol after a past usage of about 1.0 -  "2.0 standard drink of alcohol per week. She reports that she does not use drugs.    (Not in a hospital admission)     Allergies: Sulfa antibiotics     Review of Systems   Constitutional:  Positive for fatigue.   Respiratory: Negative.     Cardiovascular: Negative.    Gastrointestinal: Negative.    Musculoskeletal:  Positive for arthralgias.       PHYSICAL EXAMINATION:   /74 Comment: LUE  Pulse 110   Temp 97.7 °F (36.5 °C) (Temporal)   Resp 16   Ht 157.5 cm (62\")   Wt 81.6 kg (180 lb)   SpO2 98% Comment: RA  BMI 32.92 kg/m²    Pain Score    03/25/25 1319   PainSc: 0-No pain        ECOG score: 0            ECOG Performance Status: 1 - Symptomatic but completely ambulatory      General Appearance:      alert, cooperative, no apparent distress and appears stated age   Lungs:   Clear to auscultation bilaterally; respirations regular, even, and unlabored bilaterally   Heart:  Regular rate and rhythm, no murmurs appreciated   Abdomen:   Soft, non-tender, non-distended, and no organomegaly                 No visits with results within 2 Week(s) from this visit.   Latest known visit with results is:   Lab on 03/04/2025   Component Date Value Ref Range Status    Ferritin 03/04/2025 76.41  13.00 - 150.00 ng/mL Final    Folate 03/04/2025 >20.00  4.78 - 24.20 ng/mL Final    Iron 03/04/2025 44  37 - 145 mcg/dL Final    Iron Saturation (TSAT) 03/04/2025 10 (L)  20 - 50 % Final    Transferrin 03/04/2025 301  200 - 360 mg/dL Final    TIBC 03/04/2025 448  298 - 536 mcg/dL Final    Vitamin B-12 03/04/2025 839  211 - 946 pg/mL Final    Reticulocyte % 03/04/2025 1.98 (H)  0.70 - 1.90 % Final    Reticulocyte Absolute 03/04/2025 0.0925  0.0200 - 0.1300 10*6/mm3 Final    Sed Rate 03/04/2025 27 (H)  0 - 20 mm/hr Final    C-Reactive Protein 03/04/2025 0.61 (H)  0.00 - 0.50 mg/dL Final    YEISON Direct 03/04/2025 Negative  Negative Final    Rheumatoid Factor Quantitative 03/04/2025 <10.0  0.0 - 14.0 IU/mL Final    HIV-1/ HIV-2 " 03/04/2025 Non-Reactive  Non-Reactive Final    Hepatitis B Surface Ag 03/04/2025 Non-Reactive  Non-Reactive Final    Hep A IgM 03/04/2025 Non-Reactive  Non-Reactive Final    Hep B C IgM 03/04/2025 Non-Reactive  Non-Reactive Final    Hepatitis C Ab 03/04/2025 Non-Reactive  Non-Reactive Final    e13a2 (b2a2) Transcript 03/04/2025            <0.0032 %  % Final    e14a2 (b3a2) Transcript 03/04/2025            <0.0032 %  % Final    E1A2 transcript 03/04/2025            <0.0032 %  % Final    Interpretation 03/04/2025 Negative   Final    NEGATIVE for the BCR-ABL1 e1a2 (p190), e13a2 (b2a2, p210) and e14a2  (b3a2, p210) fusion transcripts. These results do not rule out the  presence of rare BCR-ABL1 transcripts not detected by this assay.    Director Review 03/04/2025 Comment   Final    Maribel Morrissey, PhD, [facility]  Director, Molecular Oncology  [facility] [place] [address]  [phone]    Background: 03/04/2025 Comment   Final    This assay can detect three different types of BCR-ABL1 fusion  transcripts associated with CML, ALL, and AML: e13a2 (previously  b2a2) and e14a2 (previously b3a2) (major breakpoint, p210), as  well as e1a2 (minor breakpoint, p190). The e13a2 and e14a2 transcript  values are titrated to the current International Scale (IS). The  standardized baseline is 100% BCR-ABL1 (IS) and major molecular  response (MMR) is equivalent to 0.1% BCR-ABL1 (IS) corresponding  to a 3-log reduction. Results should be correlated with appropriate  clinical and laboratory information as indicated.    Methodology: 03/04/2025 Comment   Final    Total RNA is isolated from the sample and subject to a real-time,  reverse transcriptase polymerase chain reaction (RT-PCR). The PCR  primers and probes are specific for BCR-ABL1 e13a2, e14a2 and e1a2  fusion transcripts. The ABL1 transcript is amplified as the control  for cDNA quantity and quality. Serial dilutions of a  validated  positive control RNA with known t(9;22) BCR-ABL1 are used as  reference for quantification of BCR-ABL1 relative to ABL1. The  numeric BCR-ABL1 level is reported as % BCR-ABL1/ABL1 and the  detection sensitivity is 4.5 log below the standard baseline  (<0.0032%).  This test was developed and its performance characteristics determined  by [employer]. It has not been cleared or approved by the Food and Drug  Administration.    References: 03/04/2025 Comment   Final    1) Edgard Cerda. Molecular monitoring of chronic myeloid     leukemia. Semin Hematol. 2003 Apr; 40(2 Suppl 2):62-68.  2) NCCN Clinical Practice Guidelines in Oncology Chronic Myeloid     Leukemia Version 1.2025 - August 8, 2024  3) [place] Mccracken, Berenice P, et al. Establishment of the     of the first World Health Organization International Genetic     Reference Panel for quantitation of BCR-ABL mRNA. Blood. 2010     25; 116(22):f617-681.    JAK2 V617 Mutation Qnt Result 03/04/2025 Comment   Final    NEGATIVE  The JAK2 V617F mutation is not detected in the provided specimen of  this individual. This result does not rule out the presence of the  JAK2 mutation at a level below the sensitivity of detection of this  assay, or the presence of other mutations within JAK2 not detected by  this assay.    Background 03/04/2025 Comment   Final    The Quantitative Real-Time PCR assay detects V617F mutation  (c.1849 G>T) observed in approximately 95% polycythemia vera (PV), 55%  essential thrombocythemia (ET) and 55% primary myelofibrosis (PMF). It  is also infrequently present (3-5%) in myelodysplastic syndrome,  chronic myelomonocytic leukemia, and other atypical chronic myeloid  disorders. The results should be interpreted in the context of all  clinical and laboratory findings. No therapeutic action should be  taken based solely on these results. This assay detects only the JAK2  V617F point mutation. Other mutations that may occur in the  JAK2 gene  will not be detected.    Methodology 03/04/2025 Comment   Final    Total genomic DNA was extracted and subjected to TaqMan real-time PCR  amplification/detection. Two amplification products per sample were  monitored by real-time PCR using primers/probes specific to JAK2 wild  type (WT) and JAK2 mutant V617F. The [employer] Absolute Quantitation  software will compare the patient specimen values to the standard  curves and generate percent values for wild type and mutant type. The  numerical values of Sample Mutant Quantity/(Sample Mutant Quantity+  Sample Wild Type Quantity)X100 is reported as a percentage. In vitro  studies have indicated that this assay has an analytical sensitivity  of 1%.    References 03/04/2025 Comment   Final      Tez EJ, Jose R LM, Benigno PJ, et al. Acquired mutation of the  tyrosine kinase JAK2 in human myeloproliferative disorders. Lancet.  2005 Mar 19-25; 365(5248):7675-5303.   Elie CASE, Geremias V, Dahlia Andrea JP. A unique clonal JAK2 mutation leading  to constitutive signaling causes polycythaemia vera. Nature. 2005 Apr 28; 717(8983):5444-2866.   Shavon R, Katerina F, Ute AS, et al. A gain-of-function  mutation of JAK2 in myeloproliferative disorders. N Engl J Med. 2005  Apr 28; 35217):3111-8236.    Director Review 03/04/2025 Comment   Final    Technical Component performed at [facility]  Professional Component performed by:  [facility] [employer] [facility]  Maribel Morrissey, PhD, [facility]  Director, Molecular Oncology  [address] [place] [address] [place] [address]  [phone]    WBC 03/04/2025 9.51  3.40 - 10.80 10*3/mm3 Final    RBC 03/04/2025 4.61  3.77 - 5.28 10*6/mm3 Final    Hemoglobin 03/04/2025 13.3  12.0 - 15.9 g/dL Final    Hematocrit 03/04/2025 39.4  34.0 - 46.6 % Final    MCV 03/04/2025 85.5  79.0 - 97.0 fL Final    MCH 03/04/2025 28.9  26.6 - 33.0 pg Final    MCHC 03/04/2025 33.8  31.5 - 35.7 g/dL Final    RDW 03/04/2025 13.6  12.3 - 15.4 % Final    RDW-SD 03/04/2025  42.8  37.0 - 54.0 fl Final    MPV 03/04/2025 9.0  6.0 - 12.0 fL Final    Platelets 03/04/2025 395  140 - 450 10*3/mm3 Final    Neutrophil % 03/04/2025 69.4  42.7 - 76.0 % Final    Lymphocyte % 03/04/2025 21.7  19.6 - 45.3 % Final    Monocyte % 03/04/2025 6.5  5.0 - 12.0 % Final    Eosinophil % 03/04/2025 2.0  0.3 - 6.2 % Final    Basophil % 03/04/2025 0.3  0.0 - 1.5 % Final    Immature Grans % 03/04/2025 0.1  0.0 - 0.5 % Final    Neutrophils, Absolute 03/04/2025 6.60  1.70 - 7.00 10*3/mm3 Final    Lymphocytes, Absolute 03/04/2025 2.06  0.70 - 3.10 10*3/mm3 Final    Monocytes, Absolute 03/04/2025 0.62  0.10 - 0.90 10*3/mm3 Final    Eosinophils, Absolute 03/04/2025 0.19  0.00 - 0.40 10*3/mm3 Final    Basophils, Absolute 03/04/2025 0.03  0.00 - 0.20 10*3/mm3 Final    Immature Grans, Absolute 03/04/2025 0.01  0.00 - 0.05 10*3/mm3 Final        No results found.      ASSESSMENT: The patient is a very pleasant 24 y.o. female  with leukocytosis neutrophilia      PLAN:    1.  Leukocytosis neutrophilia:  A.  I did go over the better results with the patient from March 4, 2025 and reassured the patient that her white cells as well as absolute neutrophil count are back to normal 9.5 and 6.6 respectively.  B.  I have reassured the patient JAK2 mutation BCR-ABL came back negative.  CRP slightly elevated 0.6 and sed rate 27.  Negative YEISON and rheumatoid factor.  Negative hepatitis profile and HIV.    2.  Normocytic anemia:  A.  I reassured the patient hemoglobin normal on March 4, 2020 2513.3 with normal MCV of 86.  Normal folic acid vitamin B12 and ferritin.  Low saturation at 10%.    FOLLOW UP: As needed.    Rhonda Smith MD  3/25/2025

## 2025-05-10 RX ORDER — LEVOTHYROXINE SODIUM 50 UG/1
50 TABLET ORAL DAILY
Qty: 90 TABLET | Refills: 1 | Status: SHIPPED | OUTPATIENT
Start: 2025-05-10

## 2025-06-05 ENCOUNTER — OFFICE VISIT (OUTPATIENT)
Dept: INTERNAL MEDICINE | Facility: CLINIC | Age: 25
End: 2025-06-05
Payer: COMMERCIAL

## 2025-06-05 VITALS
BODY MASS INDEX: 32.76 KG/M2 | HEIGHT: 62 IN | SYSTOLIC BLOOD PRESSURE: 118 MMHG | WEIGHT: 178 LBS | OXYGEN SATURATION: 99 % | HEART RATE: 88 BPM | DIASTOLIC BLOOD PRESSURE: 76 MMHG | TEMPERATURE: 98 F

## 2025-06-05 DIAGNOSIS — F41.9 ANXIETY AND DEPRESSION: Primary | ICD-10-CM

## 2025-06-05 DIAGNOSIS — F32.A ANXIETY AND DEPRESSION: Primary | ICD-10-CM

## 2025-06-05 PROCEDURE — 99213 OFFICE O/P EST LOW 20 MIN: CPT | Performed by: FAMILY MEDICINE

## 2025-06-05 RX ORDER — CITALOPRAM HYDROBROMIDE 10 MG/1
10 TABLET ORAL DAILY
Qty: 90 TABLET | Refills: 3 | Status: SHIPPED | OUTPATIENT
Start: 2025-06-05

## 2025-06-05 NOTE — PROGRESS NOTES
Rachel Pereira is a 24 y.o. female.    Chief Complaint   Patient presents with    Anxiety    Depression       HPI   History of Present Illness  The patient presents for follow-up on anxiety and depression.    Current BuSpar regimen provides minimal relief, with diminishing effects. Experiences heightened anxiety, manages forgetfulness by writing things down. Emotional state fluctuates, transitioning from happiness to anger, possibly due to hormonal changes. Depression is controlled, but anxiety and emotional instability persist. Describes alternating periods of productivity and apathy. Reports occasional evening chest pain, possibly related to anxiety or caffeine intake. Previously effective citalopram and Wellbutrin were used before pregnancy.        The following portions of the patient's history were reviewed and updated as appropriate: allergies, current medications, past family history, past medical history, past social history, past surgical history and problem list.     Allergies   Allergen Reactions    Sulfa Antibiotics Rash         Current Outpatient Medications:     buPROPion XL (Wellbutrin XL) 150 MG 24 hr tablet, Take 1 tablet by mouth Daily., Disp: 90 tablet, Rfl: 3    busPIRone (BUSPAR) 5 MG tablet, Take 2 tablets by mouth 2 (Two) Times a Day As Needed (anxiety)., Disp: 90 tablet, Rfl: 1    levothyroxine (SYNTHROID, LEVOTHROID) 50 MCG tablet, TAKE 1 TABLET BY MOUTH EVERY DAY, Disp: 90 tablet, Rfl: 1    norethindrone (Ortho Micronor) 0.35 MG tablet, Take 1 tablet by mouth Daily., Disp: 90 tablet, Rfl: 1    Prenatal Vit-Fe Fumarate-FA (PRENATAL VITAMIN PO), Take  by mouth., Disp: , Rfl:     citalopram (CeleXA) 10 MG tablet, Take 1 tablet by mouth Daily., Disp: 90 tablet, Rfl: 3    ROS    Review of Systems   Constitutional:  Positive for fatigue. Negative for chills, diaphoresis and fever.   HENT:  Negative for congestion, sore throat and swollen glands.    Respiratory:  Negative for cough.   "  Cardiovascular:  Positive for chest pain.   Gastrointestinal:  Negative for abdominal pain, nausea and vomiting.   Genitourinary:  Negative for dysuria.   Musculoskeletal:  Positive for myalgias and neck pain.   Skin:  Negative for rash.   Neurological:  Positive for weakness. Negative for numbness.   Psychiatric/Behavioral:  Positive for agitation. Negative for sleep disturbance and depressed mood. The patient is nervous/anxious.        Vitals:    06/05/25 1129   BP: 118/76   Pulse: 88   Temp: 98 °F (36.7 °C)   SpO2: 99%   Weight: 80.7 kg (178 lb)   Height: 157.5 cm (62\")   PainSc: 0-No pain         Physical Exam     Physical Exam  Constitutional:       General: She is not in acute distress.     Appearance: Normal appearance. She is well-developed.   HENT:      Head: Normocephalic and atraumatic.      Right Ear: External ear normal.      Left Ear: External ear normal.   Eyes:      Extraocular Movements: Extraocular movements intact.      Conjunctiva/sclera: Conjunctivae normal.   Cardiovascular:      Rate and Rhythm: Normal rate.   Pulmonary:      Effort: Pulmonary effort is normal. No respiratory distress.   Musculoskeletal:      Right lower leg: No edema.      Left lower leg: No edema.   Skin:     General: Skin is warm and dry.   Neurological:      Mental Status: She is alert and oriented to person, place, and time.      Cranial Nerves: No cranial nerve deficit.   Psychiatric:         Mood and Affect: Mood normal.         Behavior: Behavior normal.             Diagnoses and all orders for this visit:    1. Anxiety and depression (Primary)    Other orders  -     citalopram (CeleXA) 10 MG tablet; Take 1 tablet by mouth Daily.  Dispense: 90 tablet; Refill: 3        Assessment & Plan  1. Anxiety and depression.  - Uncontrolled anxiety and depression, emotional fluctuations, anxiety spikes.  - BuSpar aids sleep but limited daytime effectiveness.  - Reinitiate citalopram 10 mg, previously effective.  - Continue " BuSpar at night, Continue Wellbutrin during the day.          New Medications Ordered This Visit   Medications    citalopram (CeleXA) 10 MG tablet     Sig: Take 1 tablet by mouth Daily.     Dispense:  90 tablet     Refill:  3       No orders of the defined types were placed in this encounter.      Return in about 3 months (around 9/5/2025) for anxiety and depression.    Edie Grant, DO

## 2025-06-24 ENCOUNTER — OFFICE VISIT (OUTPATIENT)
Dept: OBSTETRICS AND GYNECOLOGY | Facility: CLINIC | Age: 25
End: 2025-06-24
Payer: COMMERCIAL

## 2025-06-24 VITALS
BODY MASS INDEX: 31.65 KG/M2 | WEIGHT: 172 LBS | HEIGHT: 62 IN | DIASTOLIC BLOOD PRESSURE: 78 MMHG | SYSTOLIC BLOOD PRESSURE: 110 MMHG

## 2025-06-24 DIAGNOSIS — N93.9 ABNORMAL UTERINE BLEEDING (AUB): Primary | ICD-10-CM

## 2025-06-24 PROCEDURE — 99213 OFFICE O/P EST LOW 20 MIN: CPT | Performed by: MIDWIFE

## 2025-06-24 NOTE — PROGRESS NOTES
"Chief Complaint   Patient presents with    Menstrual Problem     Irregular periods for last 2 or 3 months      Rachel Pereira is a 24 y.o. year old  presenting to be seen for irregular bleeding/periods.  She had  2024. She is breastfeeding and is using Micronor for birth control.  She hasn't missed any pills and takes them at the same time daily.  LMP was -. She states the bleeding is more brown in color and sticky.   She has noticed that she will start bleeding 2-3 days after having sex. This has been going on for 2-3 months. She denies any pain with sex. She denies any itching,burning, or odor.    History  Past Medical History:   Diagnosis Date    Anemia     Anxiety     Asthma     exercise induced    Body piercing     both ears, left nare    COVID-19 vaccine series completed     [employer] x 2    Depression     Disease of thyroid gland     History of stomach ulcers     Hypothyroidism     Peptic ulceration     Seasonal allergies     Tattoos     UTI (urinary tract infection)     Wears glasses    , Allergies:  Sulfa antibiotics    Social History    Tobacco Use      Smoking status: Passive Smoke Exposure - Never Smoker        Passive exposure: Never      Smokeless tobacco: Never      Tobacco comments: Only smoke every once in a while when drinking      Review of Systems  Pertinent items are noted in HPI, all other systems reviewed and negative       Objective   /78   Ht 157.5 cm (62\")   Wt 78 kg (172 lb)   BMI 31.46 kg/m²     Physical Exam:  General Appearance: alert, appears stated age, and cooperative  Lungs: respirations regular, respirations even, and respirations unlabored  Abdomen: soft non-tender  Pelvic: Clinical staff was present for exam  External genitalia:  normal appearance of the external genitalia including Bartholin's and Thurman's glands.  Vaginal:  normal pink mucosa without prolapse or lesions. Reddish brown discharge noted  Cervix:  normal appearance.  Extremities: " moves extremities well, no edema, no cyanosis, and no redness  Skin: no bleeding, bruising or rash and no lesions noted  Neurologic: Mental Status orientated to person, place, time and situation, Speech normal content and execusion    Lab Review   No data reviewed    Imaging   No data reviewed         Assessment /Plan    Diagnoses and all orders for this visit:    1. Abnormal uterine bleeding (AUB) (Primary)  -     NuSwab VG+ - Swab, Cervix  Will continue Micronor.  Advised if AUB continues, may need TVS to assess endometrial lining        No orders of the defined types were placed in this encounter.    Follow up PRN           This note was electronically signed.  Tamika Paz CNM  6/24/2025

## 2025-06-26 LAB
A VAGINAE DNA VAG QL NAA+PROBE: NORMAL SCORE
BVAB2 DNA VAG QL NAA+PROBE: NORMAL SCORE
C ALBICANS DNA VAG QL NAA+PROBE: NEGATIVE
C GLABRATA DNA VAG QL NAA+PROBE: NEGATIVE
C TRACH DNA SPEC QL NAA+PROBE: NEGATIVE
MEGA1 DNA VAG QL NAA+PROBE: NORMAL SCORE
N GONORRHOEA DNA VAG QL NAA+PROBE: NEGATIVE
T VAGINALIS DNA VAG QL NAA+PROBE: NEGATIVE

## 2025-08-21 ENCOUNTER — OFFICE VISIT (OUTPATIENT)
Dept: INTERNAL MEDICINE | Facility: CLINIC | Age: 25
End: 2025-08-21
Payer: COMMERCIAL

## 2025-08-21 VITALS
BODY MASS INDEX: 31.28 KG/M2 | OXYGEN SATURATION: 99 % | HEIGHT: 62 IN | DIASTOLIC BLOOD PRESSURE: 70 MMHG | SYSTOLIC BLOOD PRESSURE: 112 MMHG | WEIGHT: 170 LBS | HEART RATE: 88 BPM | TEMPERATURE: 98 F

## 2025-08-21 DIAGNOSIS — K11.20 SIALADENITIS: ICD-10-CM

## 2025-08-21 DIAGNOSIS — J02.9 SORE THROAT: ICD-10-CM

## 2025-08-21 DIAGNOSIS — J30.9 ALLERGIC RHINITIS, UNSPECIFIED SEASONALITY, UNSPECIFIED TRIGGER: Primary | ICD-10-CM

## 2025-08-21 LAB
EXPIRATION DATE: NORMAL
INTERNAL CONTROL: NORMAL
Lab: NORMAL
S PYO AG THROAT QL: NEGATIVE

## 2025-08-21 PROCEDURE — 99213 OFFICE O/P EST LOW 20 MIN: CPT | Performed by: FAMILY MEDICINE

## 2025-08-21 PROCEDURE — 87880 STREP A ASSAY W/OPTIC: CPT | Performed by: FAMILY MEDICINE

## 2025-08-21 RX ORDER — FLUTICASONE PROPIONATE 50 MCG
1 SPRAY, SUSPENSION (ML) NASAL DAILY
Qty: 16 G | Refills: 0 | Status: SHIPPED | OUTPATIENT
Start: 2025-08-21

## 2025-08-21 RX ORDER — LORATADINE 10 MG/1
10 TABLET ORAL DAILY
Qty: 30 TABLET | Refills: 0 | Status: SHIPPED | OUTPATIENT
Start: 2025-08-21

## 2025-08-22 RX ORDER — ACETAMINOPHEN AND CODEINE PHOSPHATE 120; 12 MG/5ML; MG/5ML
1 SOLUTION ORAL DAILY
Qty: 84 TABLET | Refills: 1 | Status: SHIPPED | OUTPATIENT
Start: 2025-08-22

## 2025-08-26 ENCOUNTER — PATIENT MESSAGE (OUTPATIENT)
Dept: INTERNAL MEDICINE | Facility: CLINIC | Age: 25
End: 2025-08-26
Payer: COMMERCIAL

## (undated) DEVICE — 2, DISPOSABLE SUCTION/IRRIGATOR WITHOUT DISPOSABLE TIP: Brand: STRYKEFLOW

## (undated) DEVICE — SPNG GZ STRL 2S 4X4 12PLY

## (undated) DEVICE — ENDOPATH XCEL UNIVERSAL TROCAR STABLILITY SLEEVES: Brand: ENDOPATH XCEL

## (undated) DEVICE — ADHS LIQ MASTISOL 2/3ML

## (undated) DEVICE — SUT VIC 0/0 UR6 27IN DYED J603H

## (undated) DEVICE — NDL HYPO ECLPS SFTY 22G 1 1/2IN

## (undated) DEVICE — LUBE JELLY PK/2.75GM STRL BX/144

## (undated) DEVICE — GLV SURG ULTRATOUCH BIOGEL/COAT PF LF SZ6 STRL

## (undated) DEVICE — TP ELECTRD LAP L WR SPLIT33CM

## (undated) DEVICE — MEDI-VAC NON-CONDUCTIVE SUCTION TUBING: Brand: CARDINAL HEALTH

## (undated) DEVICE — SLV SCD CALF HEMOFORCE DVT THERP REPROC MD

## (undated) DEVICE — ENDOPOUCH RETRIEVER SPECIMEN RETRIEVAL BAGS: Brand: ENDOPOUCH RETRIEVER

## (undated) DEVICE — SYR LL TP 10ML STRL

## (undated) DEVICE — RICH GENERAL LAPAROSCOPY: Brand: MEDLINE INDUSTRIES, INC.

## (undated) DEVICE — SOL NACL 0.9PCT 1000ML

## (undated) DEVICE — GLV SURG SENSICARE W/ALOE PF LF SZ6 STRL

## (undated) DEVICE — ENDOPATH XCEL BLUNT TIP TROCARS WITH SMOOTH SLEEVES: Brand: ENDOPATH XCEL

## (undated) DEVICE — VLV SXN AIR/H2O ORCAPOD3 1P/U STRL

## (undated) DEVICE — ENDOPATH XCEL BLADELESS TROCARS WITH STABILITY SLEEVES: Brand: ENDOPATH XCEL

## (undated) DEVICE — MONOPOLAR METZENBAUM SCISSOR, MINI BLADE TIP, DISPOSABLE: Brand: MONOPOLAR METZENBAUM SCISSOR, MINI BLADE TIP, DISPOSABLE

## (undated) DEVICE — FLTR PLUMEPORT LAP W/CONN STRL

## (undated) DEVICE — DRSNG SURESITE WNDW 4X4.5

## (undated) DEVICE — GLV SURG SENSICARE GREEN W/ALOE PF LF 6 STRL

## (undated) DEVICE — FRCP BX RADJAW4 NDL 2.8 240 STD OG

## (undated) DEVICE — UNDYED MONOFILAMENT (POLYDIOXANONE), ABSORBABLE SYNTHETIC SURGICAL SUTURE: Brand: PDS

## (undated) DEVICE — Device

## (undated) DEVICE — DISPOSABLE MONOPOLAR ENDOSCOPIC CORD 10 FT. (3M): Brand: KIRWAN

## (undated) DEVICE — BNDG ADHS PLSTC 1X3IN LF